# Patient Record
Sex: FEMALE | Race: WHITE | Employment: UNEMPLOYED | URBAN - METROPOLITAN AREA
[De-identification: names, ages, dates, MRNs, and addresses within clinical notes are randomized per-mention and may not be internally consistent; named-entity substitution may affect disease eponyms.]

---

## 2017-10-03 PROBLEM — R79.89 ELEVATED TROPONIN: Status: ACTIVE | Noted: 2017-10-03

## 2017-10-03 PROBLEM — I50.9 CHF (CONGESTIVE HEART FAILURE) (HCC): Status: ACTIVE | Noted: 2017-10-03

## 2017-10-03 PROBLEM — R77.8 ELEVATED TROPONIN: Status: ACTIVE | Noted: 2017-10-03

## 2017-10-04 LAB
LEFT VENTRICULAR EJECTION FRACTION HIGH VALUE: 60 %
LEFT VENTRICULAR EJECTION FRACTION MODE: NORMAL
LV EF: 55 %

## 2017-10-06 PROBLEM — I10 ESSENTIAL HYPERTENSION: Status: ACTIVE | Noted: 2017-10-06

## 2017-10-12 ENCOUNTER — TELEPHONE (OUTPATIENT)
Dept: CARDIOLOGY CLINIC | Age: 77
End: 2017-10-12

## 2017-10-12 NOTE — TELEPHONE ENCOUNTER
A HHN from [facility] called here. ... To say that this pt anti-biotic cleotin    Was given by hospitalist Dr Milton Encarnacion at [facility] is making her hallucinate      Jenny Torres @ [phone] says she is calling us because pt has NO PCP?     Please call Jenny Torres

## 2017-10-13 NOTE — TELEPHONE ENCOUNTER
Spoke with Dr. Arnav Ontiveros re: alternative antibiotic. He would prefer her to call the hospitalist as this is out of his specialty and not as familiar as hospitalist when it comes to antibiotics for patient. Relayed this information to Francy Russo. She thanked us and will call the hospitalist at [phone] to have Jocelyn Chadwick paged to discuss further.

## 2017-10-19 ENCOUNTER — TELEPHONE (OUTPATIENT)
Dept: CARDIOLOGY CLINIC | Age: 77
End: 2017-10-19

## 2017-10-19 ENCOUNTER — OFFICE VISIT (OUTPATIENT)
Dept: CARDIOLOGY CLINIC | Age: 77
End: 2017-10-19

## 2017-10-19 VITALS
DIASTOLIC BLOOD PRESSURE: 64 MMHG | WEIGHT: 200 LBS | BODY MASS INDEX: 37.79 KG/M2 | SYSTOLIC BLOOD PRESSURE: 120 MMHG | HEART RATE: 88 BPM

## 2017-10-19 DIAGNOSIS — I50.32 CHRONIC DIASTOLIC CONGESTIVE HEART FAILURE (HCC): ICD-10-CM

## 2017-10-19 DIAGNOSIS — I10 ESSENTIAL HYPERTENSION: ICD-10-CM

## 2017-10-19 DIAGNOSIS — R60.0 BILATERAL LOWER EXTREMITY EDEMA: ICD-10-CM

## 2017-10-19 DIAGNOSIS — I50.31 ACUTE DIASTOLIC HEART FAILURE (HCC): Primary | ICD-10-CM

## 2017-10-19 PROCEDURE — 99214 OFFICE O/P EST MOD 30 MIN: CPT | Performed by: NURSE PRACTITIONER

## 2017-10-19 PROCEDURE — G8400 PT W/DXA NO RESULTS DOC: HCPCS | Performed by: NURSE PRACTITIONER

## 2017-10-19 PROCEDURE — 1111F DSCHRG MED/CURRENT MED MERGE: CPT | Performed by: NURSE PRACTITIONER

## 2017-10-19 PROCEDURE — G8417 CALC BMI ABV UP PARAM F/U: HCPCS | Performed by: NURSE PRACTITIONER

## 2017-10-19 PROCEDURE — 4040F PNEUMOC VAC/ADMIN/RCVD: CPT | Performed by: NURSE PRACTITIONER

## 2017-10-19 PROCEDURE — G8484 FLU IMMUNIZE NO ADMIN: HCPCS | Performed by: NURSE PRACTITIONER

## 2017-10-19 PROCEDURE — 1123F ACP DISCUSS/DSCN MKR DOCD: CPT | Performed by: NURSE PRACTITIONER

## 2017-10-19 PROCEDURE — G8427 DOCREV CUR MEDS BY ELIG CLIN: HCPCS | Performed by: NURSE PRACTITIONER

## 2017-10-19 PROCEDURE — 1090F PRES/ABSN URINE INCON ASSESS: CPT | Performed by: NURSE PRACTITIONER

## 2017-10-19 PROCEDURE — 1036F TOBACCO NON-USER: CPT | Performed by: NURSE PRACTITIONER

## 2017-10-19 RX ORDER — CRANBERRY FRUIT EXTRACT 425 MG
4200 CAPSULE ORAL
COMMUNITY

## 2017-10-19 RX ORDER — LIDOCAINE 50 MG/G
1 PATCH TOPICAL DAILY
Qty: 30 PATCH | Refills: 0 | Status: SHIPPED | OUTPATIENT
Start: 2017-10-19

## 2017-10-19 RX ORDER — NITROFURANTOIN MACROCRYSTALS 100 MG/1
100 CAPSULE ORAL 4 TIMES DAILY
COMMUNITY
End: 2019-01-10 | Stop reason: ALTCHOICE

## 2017-10-19 RX ORDER — FUROSEMIDE 40 MG/1
40 TABLET ORAL 2 TIMES DAILY
Qty: 60 TABLET | Refills: 3 | Status: SHIPPED | OUTPATIENT
Start: 2017-10-19 | End: 2017-11-16 | Stop reason: SDUPTHER

## 2017-10-19 ASSESSMENT — ENCOUNTER SYMPTOMS
EYES NEGATIVE: 1
RESPIRATORY NEGATIVE: 1
GASTROINTESTINAL NEGATIVE: 1

## 2017-10-19 NOTE — PATIENT INSTRUCTIONS
1. Medications: Take afternoon lasix today then starting tomorrow, take 80mg lasix once a day instead of 40 twice a day, continue other medications  2. Labs: next week BMP per Home Health  3. Referrals: none   4. Lifestyle Recommendations: continue low sodium diet  5. Follow up: 3- 4 weeks with Jose Johnson  CHF Resource Line: [phone]  Patient Education        Limiting Sodium and Fluids With Heart Failure: Care Instructions  Your Care Instructions  Sodium causes your body to hold on to extra water. This may cause your heart failure symptoms to get worse. Limiting sodium may help you feel better and lower your risk of having to go to the hospital.  People get most of their sodium from processed foods. Fast food and restaurant meals also tend to be very high in sodium. Your doctor may suggest that you limit sodium to 2,000 milligrams (mg) a day or less. That is less than 1 teaspoon of salt a day, including all the salt you eat in cooked or packaged foods. Usually, you have to limit the amount of liquids you drink only if your heart failure is severe. Limiting sodium alone often is enough to help your body get rid of extra fluids. However, your doctor may tell you to limit your fluid intake to a set amount each day. Follow-up care is a key part of your treatment and safety. Be sure to make and go to all appointments, and call your doctor if you are having problems. It's also a good idea to know your test results and keep a list of the medicines you take. How can you care for yourself at home? Read food labels  · Read food labels on cans and food packages. The labels tell you how much sodium is in each serving. Make sure that you look at the serving size. If you eat more than the serving size, you have eaten more sodium than is listed for one serving. · Food labels also tell you the Percent Daily Value.  If the Percent Daily Value says 50%, it means that you will get at least 50% of all the sodium you need for the entire day in one serving. Choose products with low Percent Daily Values for sodium. · Be aware that sodium can come in forms other than salt, including monosodium glutamate (MSG), sodium citrate, and sodium bicarbonate (baking soda). MSG is often added to Asian food. You can sometimes ask for food without MSG or salt. Buy low-sodium foods  · Buy foods that are labeled \"unsalted\" (no salt added), \"sodium-free\" (less than 5 mg of sodium per serving), or \"low-sodium\" (less than 140 mg of sodium per serving). A food labeled \"light sodium\" has less than half of the full-sodium version of that food. Foods labeled \"reduced-sodium\" may still have too much sodium. · Buy fresh vegetables or plain, frozen vegetables. Buy low-sodium versions of canned vegetables, soups, and other canned goods. Prepare low-sodium meals  · Use less salt each day when cooking. Reducing salt in this way will help you adjust to the taste. Do not add salt after cooking. Take the salt shaker off the table. · Flavor your food with garlic, lemon juice, onion, vinegar, herbs, and spices instead of salt. Do not use soy sauce, steak sauce, onion salt, garlic salt, mustard, or ketchup on your food. · Make your own salad dressings, sauces, and ketchup without adding salt. · Use less salt (or none) when recipes call for it. You can often use half the salt a recipe calls for without losing flavor. Other dishes like rice, pasta, and grains do not need added salt. · Rinse canned vegetables. This removes somebut not allof the salt. · Avoid water that has a naturally high sodium content or that has been treated with water softeners, which add sodium. Call your local water company to find out the sodium content of your water supply. If you buy bottled water, read the label and choose a sodium-free brand. Avoid high-sodium foods, such as:  · Smoked, cured, salted, and canned meat, fish, and poultry.   · Ham, huang, hot dogs, and luncheon meats.  · Regular, hard, and processed cheese and regular peanut butter. · Crackers with salted tops. · Frozen prepared meals. · Canned and dried soups, broths, and bouillon, unless labeled sodium-free or low-sodium. · Canned vegetables, unless labeled sodium-free or low-sodium. · Salted snack foods such as chips and pretzels. · [place] fries, pizza, tacos, and other fast foods. · Pickles, olives, ketchup, and other condiments, especially soy sauce, unless labeled sodium-free or low-sodium. If you cannot cook for yourself  · Have family members or friends help you, or have someone cook low-sodium meals. · Check with your local senior nutrition program to find out where meals are served and whether they offer a low-sodium option. You can often find these programs through your local health department or hospital.  · Have meals delivered to your home. Most [facility] have a Meals on Openet. These programs provide one hot meal a day for older adults, delivered to their homes. Ask whether these meals are low-sodium. Let them know that you are on a low-sodium diet. Limiting fluid intake  · Find a method that works for you. You might simply write down how much you drink every time you do. Some people keep a container filled with the amount of fluid allowed for that day. If they drink from a source other than the container, then they pour out that amount. · Measure your regular drinking glasses to find out how much fluid each one holds. Once you know this, you will not have to measure every time. · Besides water, milk, juices, and other drinks, some foods have a lot of fluid. Count any foods that will melt (such as ice cream or gelatin dessert) or liquid foods (such as soup) as part of your fluid intake for the day. Where can you learn more? Go to https://ruby.healthOpen Lending. org and sign in to your Ambassador account.  Enter A166 in the Molcure box to learn more about \"Limiting Sodium and Fluids With Heart Failure: Care Instructions. \"     If you do not have an account, please click on the \"Sign Up Now\" link. Current as of: November 15, 2016  Content Version: 11.3  © 3526-2881 [employer]. Care instructions adapted under license by [facility] ([facility]). If you have questions about a medical condition or this instruction, always ask your healthcare professional. [address] any warranty or liability for your use of this information.

## 2017-10-19 NOTE — PROGRESS NOTES
[address]   Congestive Heart Failure    Primary Care Doctor:  Ancelmo Gonzalez    Chief Complaint: \"peeing all the time\"     History of Present Illness:  Jay Early is a 64 y.o. female with PMH HTN and newly diagnosed HFpEF who presents today for hospital f/u. Jay Early was admitted  10/2/17with increased edema and dyspnea and discharged 10/11/17. Since hospitalization he/she reports dyspnea, edema and denies chest pain, palpitations, orthopnea, PND, exertional chest pressure/discomfort, fatigue, early saiety, syncope. Approximate hosp diuresis was 21.5L, hospital weight on d/c was 216lb and discharge home weight was 210lb. Daily wts since that time have continued to decrease. Today's home wt: 200, last known dry wt prior to hosp was about 190lb  BP's at home 140-150's/60-80's  Med changes include starting metoprolol and increased lasix to 40mg twice a day  Sodium and fluid restriction compliance: good  Skipped this morning's lasix due to OV and c/o increased urination at night    Sodium Restrictions; 2g  Fluid Restrictions; 48-64 oz/day    Activity: increased since before admission    EF: 55-60%         Past Medical History:   has a past medical history of Hypertension. Surgical History:   has a past surgical history that includes Hysterectomy and Cholecystectomy. Social History:   reports that she has never smoked. She has never used smokeless tobacco. She reports that she does not drink alcohol or use drugs. Family History:   History reviewed. No pertinent family history. Home Medications:  Prior to Admission medications    Medication Sig Start Date End Date Taking?  Authorizing Provider   metoprolol tartrate (LOPRESSOR) 25 MG tablet Take 1 tablet by mouth 2 times daily 10/11/17   Linda Tomlinson MD   furosemide (LASIX) 40 MG tablet Take 1 tablet by mouth 2 times daily 10/11/17   Linda Tomlinson MD   chlordiazePOXIDE-clidinium (LIBRAX) 5-2.5 MG per capsule Take 1 capsule by mouth 4 times daily as needed    Historical Provider, MD   Fexofenadine HCl (ALLEGRA PO) Take 180 mg by mouth    Historical Provider, MD   clobetasol (TEMOVATE) 0.05 % cream Apply topically 2 times daily Apply topically 2 times daily. Historical Provider, MD   Omega-3 Fatty Acids (FISH OIL PO) Take 1,200 mg by mouth    Historical Provider, MD   hydrALAZINE (APRESOLINE) 50 MG tablet Take 50 mg by mouth 3 times daily    Historical Provider, MD   hydrOXYzine (VISTARIL) 25 MG capsule Take 25 mg by mouth    Historical Provider, MD   lidocaine (LIDODERM) 5 % Place 1 patch onto the skin daily 12 hours on, 12 hours off. Historical Provider, MD   magnesium (MAGNESIUM-OXIDE) 250 MG TABS tablet Take 250 mg by mouth daily    Historical Provider, MD   Multiple Vitamins-Minerals (MULTIVITAMIN ADULT PO) Take by mouth    Historical Provider, MD   esomeprazole Magnesium (NEXIUM) 40 MG PACK Take 40 mg by mouth daily    Historical Provider, MD   NYSTATIN EX Apply topically    Historical Provider, MD   potassium chloride (MICRO-K) 10 MEQ extended release capsule Take 10 mEq by mouth    Historical Provider, MD        Allergies:  Accupril [quinapril hcl]; Anaprox [naproxen sodium]; Biaxin [clarithromycin]; Buspar [buspirone]; Butrans [buprenorphine]; Capoten [captopril]; Cardene [nicardipine]; Cardizem [diltiazem hcl]; Ciprofloxacin; Codeine; Cymbalta [duloxetine hcl]; Demerol hcl [meperidine]; Doxycycline; Elavil [amitriptyline hcl]; Entex lq [phenylephrine-guaifenesin]; Feldene [piroxicam]; Gabapentin; Hctz [hydrochlorothiazide]; Isoptin [verapamil]; Keflex [cephalexin]; Lorazepam; Lyrica [pregabalin]; Meclomen [meclofenamate]; Morphine; Nabumetone; Nucynta [tapentadol]; Ofloxacin; Peanut butter flavor; Percocet [oxycodone-acetaminophen]; Premarin [conjugated estrogens]; Prinivil [lisinopril]; Seldane [terfenadine]; Strawberry flavor; Sulfa antibiotics; Tagamet [cimetidine];  Temazepam; Terbinafine and related; 10/11/2017    BUN 42 10/10/2017    BUN 43 10/09/2017    CREATININE 1.7 10/11/2017    CREATININE 1.6 10/10/2017    CREATININE 1.5 10/09/2017     BNP:   Lab Results   Component Value Date    PROBNP 2,567 10/02/2017        Cardiac Imaging:Echo 10/4/17  Left ventricle size is normal.   There is moderate concentric left ventricular hypertrophy. Ejection fraction is visually estimated to be 55-60 %. Diastolic filling parameters suggests grade I diastolic dysfunction . Mild mitral regurgitation is present. The left atrium is dilated. .There is mild tricuspid regurgitation with RVSP estimated at 34 mmHg      Device; No    JOSE Evaulation:  No      Assessment:    1. Acute diastolic heart failure ([address] - compensated   2. Bilateral lower extremity edema - improved, still have about 10lb left to lose   3. Essential hypertension - controlled   4. Chronic diastolic congestive heart failure ([address]          Plan:   1. Medications: Take afternoon lasix today then starting tomorrow, take 80mg lasix once a day instead of 40 twice a day, continue other medications  2. Labs: next week BMP  3. Referrals: none, consider JOSE evaluation   4. Lifestyle Recommendations: continue low sodium diet  5. Follow up: 4 weeks with Ashley Monroy  CHF Resource Line: [phone]      I appreciate the opportunity of cooperating in the care of this individual.    Renan Estevez CNP, 10/19/2017, 12:37 PM    QUALITY MEASURES  1. Tobacco Cessation Counseling: NA  2. Retake of BP if >140/90:   NA  3. Documentation to PCP/referring for new patient:  Sent to PCP at close of office visit  4. CAD patient on anti-platelet: NA  5. CAD patient on STATIN therapy:  NA  6. Patient with CHF and aFib on anticoagulation:  NA   7. Patient Education:  Yes   8. BB for LVSD :  NA   9. ACE/ARB for LVSD:  NA   10.  Left Ventricular Ejection Fraction (LVEF) Assessment:  Yes

## 2017-10-24 LAB
BUN BLDV-MCNC: 88 MG/DL (ref 8–26)
CALCIUM SERPL-MCNC: 10.5 MG/DL (ref 8.5–10.5)
CHLORIDE BLD-SCNC: 92 MEQ/L (ref 101–111)
CO2: 32 MEQ/L (ref 24–36)
CREAT SERPL-MCNC: 2.99 MG/DL (ref 0.44–1.03)
GFR AFRICAN AMERICAN: 18 ML/MIN/1.73 SQ METER
GFR NON-AFRICAN AMERICAN: 15 ML/MIN/1.73 SQ METER
GLUCOSE BLD-MCNC: 136 MG/DL (ref 70–99)
OSMOLALITY CALCULATION: 301 MOSM/KG (ref 280–300)
POTASSIUM SERPL-SCNC: 4.7 MEQ/L (ref 3.6–5.4)
SODIUM BLD-SCNC: 136 MEQ/L (ref 135–145)

## 2017-10-25 ENCOUNTER — TELEPHONE (OUTPATIENT)
Dept: CARDIOLOGY CLINIC | Age: 77
End: 2017-10-25

## 2017-10-26 ENCOUNTER — TELEPHONE (OUTPATIENT)
Dept: CARDIOLOGY CLINIC | Age: 77
End: 2017-10-26

## 2017-10-26 DIAGNOSIS — I50.31 ACUTE DIASTOLIC HEART FAILURE (HCC): Primary | ICD-10-CM

## 2017-10-26 DIAGNOSIS — I50.32 CHRONIC DIASTOLIC CONGESTIVE HEART FAILURE (HCC): ICD-10-CM

## 2017-10-26 NOTE — TELEPHONE ENCOUNTER
Wt stable, continue holding lasix, hold potassium as well. Call office if wt increases 3lb or more, repeat labs on Monday:  BMP, BNP, MG.  Thanks, yuli

## 2017-10-27 ENCOUNTER — TELEPHONE (OUTPATIENT)
Dept: CARDIOLOGY CLINIC | Age: 77
End: 2017-10-27

## 2017-10-27 NOTE — TELEPHONE ENCOUNTER
Pt called back with weight pt is  199 lbs today wanted to know if she should stop lasix and potassium

## 2017-10-30 ENCOUNTER — TELEPHONE (OUTPATIENT)
Dept: CARDIOLOGY CLINIC | Age: 77
End: 2017-10-30

## 2017-11-07 ENCOUNTER — HOSPITAL ENCOUNTER (OUTPATIENT)
Dept: OTHER | Age: 77
Discharge: HOME | End: 2017-11-07
Attending: INTERNAL MEDICINE | Admitting: INTERNAL MEDICINE

## 2017-11-07 LAB
ANION GAP SERPL CALCULATED.3IONS-SCNC: 14 MMOL/L (ref 3–16)
BUN BLDV-MCNC: 45 MG/DL (ref 7–20)
CALCIUM SERPL-MCNC: 10.5 MG/DL (ref 8.3–10.6)
CHLORIDE BLD-SCNC: 97 MMOL/L (ref 99–110)
CO2: 31 MMOL/L (ref 21–32)
CREAT SERPL-MCNC: 1.4 MG/DL (ref 0.6–1.2)
GFR AFRICAN AMERICAN: 44
GFR NON-AFRICAN AMERICAN: 36
GLUCOSE BLD-MCNC: 85 MG/DL (ref 70–99)
MAGNESIUM: 2.7 MG/DL (ref 1.8–2.4)
POTASSIUM SERPL-SCNC: 4.7 MMOL/L (ref 3.5–5.1)
PRO-BNP: 984 PG/ML (ref 0–449)
SODIUM BLD-SCNC: 142 MMOL/L (ref 136–145)

## 2017-11-09 ENCOUNTER — OFFICE VISIT (OUTPATIENT)
Dept: CARDIOLOGY CLINIC | Age: 77
End: 2017-11-09

## 2017-11-09 VITALS
HEART RATE: 78 BPM | SYSTOLIC BLOOD PRESSURE: 128 MMHG | BODY MASS INDEX: 36.84 KG/M2 | WEIGHT: 195 LBS | DIASTOLIC BLOOD PRESSURE: 64 MMHG

## 2017-11-09 DIAGNOSIS — R60.0 BILATERAL LOWER EXTREMITY EDEMA: ICD-10-CM

## 2017-11-09 DIAGNOSIS — I50.31 ACUTE DIASTOLIC HEART FAILURE (HCC): Primary | ICD-10-CM

## 2017-11-09 DIAGNOSIS — I10 ESSENTIAL HYPERTENSION: ICD-10-CM

## 2017-11-09 PROCEDURE — 99214 OFFICE O/P EST MOD 30 MIN: CPT | Performed by: NURSE PRACTITIONER

## 2017-11-09 PROCEDURE — 1111F DSCHRG MED/CURRENT MED MERGE: CPT | Performed by: NURSE PRACTITIONER

## 2017-11-09 PROCEDURE — G8484 FLU IMMUNIZE NO ADMIN: HCPCS | Performed by: NURSE PRACTITIONER

## 2017-11-09 PROCEDURE — 1036F TOBACCO NON-USER: CPT | Performed by: NURSE PRACTITIONER

## 2017-11-09 PROCEDURE — G8417 CALC BMI ABV UP PARAM F/U: HCPCS | Performed by: NURSE PRACTITIONER

## 2017-11-09 PROCEDURE — G8427 DOCREV CUR MEDS BY ELIG CLIN: HCPCS | Performed by: NURSE PRACTITIONER

## 2017-11-09 PROCEDURE — 4040F PNEUMOC VAC/ADMIN/RCVD: CPT | Performed by: NURSE PRACTITIONER

## 2017-11-09 PROCEDURE — 1090F PRES/ABSN URINE INCON ASSESS: CPT | Performed by: NURSE PRACTITIONER

## 2017-11-09 PROCEDURE — G8400 PT W/DXA NO RESULTS DOC: HCPCS | Performed by: NURSE PRACTITIONER

## 2017-11-09 PROCEDURE — 1123F ACP DISCUSS/DSCN MKR DOCD: CPT | Performed by: NURSE PRACTITIONER

## 2017-11-09 RX ORDER — HYDRALAZINE HYDROCHLORIDE 50 MG/1
50 TABLET, FILM COATED ORAL 4 TIMES DAILY
Qty: 360 TABLET | Refills: 3 | Status: SHIPPED | OUTPATIENT
Start: 2017-11-09 | End: 2018-04-03 | Stop reason: ALTCHOICE

## 2017-11-09 ASSESSMENT — ENCOUNTER SYMPTOMS
EYES NEGATIVE: 1
RESPIRATORY NEGATIVE: 1
GASTROINTESTINAL NEGATIVE: 1

## 2017-11-09 NOTE — PATIENT INSTRUCTIONS
1. Medications: Continue Lasix 40mg/day, if wt increases to 198lb then take 80mg Lasix  2. Labs: 4 weeks  3. Referrals: JOSE evaluation strongly reccommended   4. Lifestyle Recommendations: continue low sodium diet  5. Follow up: 6 weeks with Adolph Goodell, 3 months with Dr Jose Obrien: [phone]  Patient Education        Learning About Heart Failure Zones  What are heart failure zones? Heart failure zones give you an easy way to see changes in your heart failure symptoms. They also tell you when you need to get help. Check every day to see which zone you are in. Green zone. You are doing well. This is where you want to be. · Your weight is stable. This means it is not going up or down. · You breathe easily. · You are sleeping well. You are able to lie flat without shortness of breath. · You can do your usual activities. Yellow zone. Be careful. Your symptoms are changing. Call your doctor. · You have new or increased shortness of breath. · You are dizzy or lightheaded, or you feel like you may faint. · You have sudden weight gain, such as more than 2 to 3 pounds in a day or 5 pounds in a week. (Your doctor may suggest a different range of weight gain.)  · You have increased swelling in your legs, ankles, or feet. · You are so tired or weak that you cannot do your usual activities. · You are not sleeping well. Shortness of breath wakes you up at night. You need extra pillows. Your doctor's name: ____________________________________________________________  Your doctor's contact information: _________________________________________________  Red zone. This is an emergency. Call 911. You have symptoms of sudden heart failure, such as:  · You have severe trouble breathing. · You cough up pink, foamy mucus. · You have a new irregular or fast heartbeat. You have symptoms of a heart attack. These may include:  · Chest pain or pressure, or a strange feeling in the chest.  · Sweating.   · Shortness of breath. · Nausea or vomiting. · Pain, pressure, or a strange feeling in the back, neck, jaw, or upper belly or in one or both shoulders or arms. · Lightheadedness or sudden weakness. · A fast or irregular heartbeat. If you have symptoms of a heart attack: After you call 911, the  may tell you to chew 1 adult-strength or 2 to 4 low-dose aspirin. Wait for an ambulance. Do not try to drive yourself. Follow-up care is a key part of your treatment and safety. Be sure to make and go to all appointments, and call your doctor if you are having problems. It's also a good idea to know your test results and keep a list of the medicines you take. Where can you learn more? Go to https://Sonico.Sift. org and sign in to your rubberit account. Enter T174 in the CloudBees box to learn more about \"Learning About Heart Failure Zones. \"     If you do not have an account, please click on the \"Sign Up Now\" link. Current as of: February 23, 2017  Content Version: 11.3  © 9684-6582 [employer]orporated. Care instructions adapted under license by [facility] ([facility]). If you have questions about a medical condition or this instruction, always ask your healthcare professional. [place] [address] any warranty or liability for your use of this information.

## 2017-11-09 NOTE — PROGRESS NOTES
[facility]   Congestive Heart Failure    Primary Care Doctor:  Pavel Willis    Chief Complaint: leg cramps, swelling    History of Present Illness:  Leopoldo Medina is a 76 y.o. female with PMH HTN and newly diagnosed HFpEF with Oct hospitalization who presents today for f/u. She continues to lose weight post-hospital, her Cr increased to 2.9 10/24 and lasix held then decreased. Most recent Cr 11/7 was 1.4  Home wt today 195 and her baseline seems to be about 190lb  BP's at home 120's/70's  She reports inpt stay at [facility] for UTI recently    She denies chest pain, increased dyspnea, palpitations, orthopnea, PND, or worsening edema      Sodium Restrictions; 2g  Fluid Restrictions; 48-64 oz/day  Sodium and fluid restriction compliance: good    Activity: limited by joint pain    EF: 55-60%      Past Medical History:   has a past medical history of Hypertension. Surgical History:   has a past surgical history that includes Hysterectomy and Cholecystectomy. Social History:   reports that she has never smoked. She has never used smokeless tobacco. She reports that she does not drink alcohol or use drugs. Family History:   History reviewed. No pertinent family history. Home Medications:  Prior to Admission medications    Medication Sig Start Date End Date Taking?  Authorizing Provider   nitrofurantoin (MACRODANTIN) 100 MG capsule Take 100 mg by mouth 4 times daily    Historical Provider, MD   Cranberry 425 MG CAPS Take by mouth    Historical Provider, MD   diphenhydrAMINE-APAP 12.5-325 MG/15ML LIQD Take by mouth    Historical Provider, MD   ibuprofen (ADVIL;MOTRIN) 100 MG/5ML suspension Take by mouth every 4 hours as needed for Fever    Historical Provider, MD   metoprolol tartrate (LOPRESSOR) 25 MG tablet Take 1 tablet by mouth 2 times daily 10/19/17   Anne Finley NP   furosemide (LASIX) 40 MG tablet Take 1 tablet by mouth 2 times daily 10/19/17   Anne Finley NP   lidocaine (LIDODERM) 5 % Place 1 patch onto the skin daily 12 hours on, 12 hours off. 10/19/17   Rebecca Liu, NP   chlordiazePOXIDE-clidinium (LIBRAX) 5-2.5 MG per capsule Take 1 capsule by mouth 4 times daily as needed    Historical Provider, MD   Fexofenadine HCl (ALLEGRA PO) Take 180 mg by mouth    Historical Provider, MD   clobetasol (TEMOVATE) 0.05 % cream Apply topically 2 times daily Apply topically 2 times daily. Historical Provider, MD   Omega-3 Fatty Acids (FISH OIL PO) Take 1,200 mg by mouth    Historical Provider, MD   hydrALAZINE (APRESOLINE) 50 MG tablet Take 50 mg by mouth 3 times daily    Historical Provider, MD   hydrOXYzine (VISTARIL) 25 MG capsule Take 25 mg by mouth    Historical Provider, MD   magnesium (MAGNESIUM-OXIDE) 250 MG TABS tablet Take 250 mg by mouth daily    Historical Provider, MD   Multiple Vitamins-Minerals (MULTIVITAMIN ADULT PO) Take by mouth    Historical Provider, MD   esomeprazole Magnesium (NEXIUM) 40 MG PACK Take 40 mg by mouth daily    Historical Provider, MD   NYSTATIN EX Apply topically    Historical Provider, MD   potassium chloride (MICRO-K) 10 MEQ extended release capsule Take 10 mEq by mouth    Historical Provider, MD        Allergies:  Accupril [quinapril hcl]; Anaprox [naproxen sodium]; Biaxin [clarithromycin]; Buspar [buspirone]; Butrans [buprenorphine]; Capoten [captopril]; Cardene [nicardipine]; Cardizem [diltiazem hcl]; Ciprofloxacin; Codeine; Cymbalta [duloxetine hcl]; Demerol hcl [meperidine]; Doxycycline; Elavil [amitriptyline hcl]; Entex lq [phenylephrine-guaifenesin]; Feldene [piroxicam]; Gabapentin; Hctz [hydrochlorothiazide]; Isoptin [verapamil]; Keflex [cephalexin]; Lorazepam; Lyrica [pregabalin]; Meclomen [meclofenamate]; Morphine; Nabumetone; Nucynta [tapentadol]; Ofloxacin; Peanut butter flavor; Percocet [oxycodone-acetaminophen]; Premarin [conjugated estrogens]; Prinivil [lisinopril]; Seldane [terfenadine];  Strawberry flavor; Sulfa antibiotics; Tagamet [cimetidine]; Temazepam; Terbinafine and related; Tetracyclines & related; Tramadol; Trazodone and nefazodone; Voltaren [diclofenac sodium]; Zanaflex [tizanidine hcl]; Amoxicillin; and Penicillins     ROS:   Review of Systems   Constitutional: Negative. HENT: Negative. Eyes: Negative. Respiratory: Negative. Cardiovascular: Positive for leg swelling. Improved   Gastrointestinal: Negative. Genitourinary: Negative. Recent UTI   Musculoskeletal: Negative. Skin: Negative. Neurological: Negative. Hematological: Negative. Psychiatric/Behavioral: Negative. Physical Examination:    Vitals:    11/09/17 1305   BP: 128/64   Pulse: 78   Weight: 195 lb (88.5 kg)           Physical Exam   Constitutional: She is oriented to person, place, and time. She appears well-developed and well-nourished. HENT:   Head: Normocephalic and atraumatic. Eyes: Conjunctivae are normal. Pupils are equal, round, and reactive to light. Neck: Normal range of motion. Neck supple. JVD present. Cardiovascular: Normal rate, regular rhythm, normal heart sounds and intact distal pulses. Pulmonary/Chest: Effort normal and breath sounds normal.   Abdominal: Soft. Musculoskeletal: Normal range of motion. She exhibits edema. 1+ bilaterally   Neurological: She is alert and oriented to person, place, and time. Skin: Skin is warm and dry. Psychiatric: She has a normal mood and affect. Vitals reviewed.       Lab Data:    CBC:   Lab Results   Component Value Date    WBC 7.7 10/02/2017    RBC 3.91 10/02/2017    HGB 11.6 10/02/2017    HCT 35.7 10/02/2017    MCV 91.3 10/02/2017    RDW 14.7 10/02/2017     10/02/2017     BMP:  Lab Results   Component Value Date     11/07/2017     10/24/2017     10/11/2017    K 4.7 11/07/2017    K 4.7 10/24/2017    K 3.8 10/11/2017    CL 97 11/07/2017    CL 92 10/24/2017    CL 90 10/11/2017    CO2 31 11/07/2017    CO2 32 10/24/2017    CO2 41 10/11/2017    PHOS 2.9 10/11/2017    PHOS 2.8 10/10/2017    PHOS 3.3 10/09/2017    BUN 45 2017    BUN 88 10/24/2017    BUN 43 10/11/2017    CREATININE 1.4 2017    CREATININE 2.99 10/24/2017    CREATININE 1.7 10/11/2017     BNP:   Lab Results   Component Value Date    PROBNP 984 2017    PROBNP 2,567 10/02/2017        Cardiac Imaging:Echo 10/4/17  Left ventricle size is normal.   There is moderate concentric left ventricular hypertrophy. Ejection fraction is visually estimated to be 55-60 %. Diastolic filling parameters suggests grade I diastolic dysfunction . Mild mitral regurgitation is present. The left atrium is dilated. .There is mild tricuspid regurgitation with RVSP estimated at 34 mmHg      Device; No    JOSE Evaulation:  No, refuses       Assessment:    1. Acute diastolic heart failure ([address] - compensated   2. Bilateral lower extremity edema - improved   3. Essential hypertension - controlled   4. Chronic diastolic congestive heart failure ([address] - encouraged JOSE evaluation         Plan:   1. Medications: Continue Lasix 40mg/day, if wt increases to 198lb then take 80mg Lasix  2. Labs: 4 weeks  3. Referrals: JOSE evaluation strongly reccommended   4. Lifestyle Recommendations: continue low sodium diet  5. Follow up: 6 weeks with Fartun Montalvo, 3 months with Dr Gerhardt Shilling: [phone]      I appreciate the opportunity of cooperating in the care of this individual.    Katarina Sesay CNP, 2017, 1:03 PM    QUALITY MEASURES  1. Tobacco Cessation Counseling: NA  2. Retake of BP if >140/90:   NA  3. Documentation to PCP/referring for new patient:  Sent to PCP at close of office visit  4. CAD patient on anti-platelet: NA  5. CAD patient on STATIN therapy:  NA  6. Patient with CHF and aFib on anticoagulation:  NA   7. Patient Education:  Yes   8. BB for LVSD :  NA   9. ACE/ARB for LVSD:  NA   10.  Left Ventricular Ejection Fraction (LVEF) Assessment:  Yes

## 2017-12-06 LAB
B-TYPE NATRIURETIC PEPTIDE: 152 PG/ML (ref 0–95)
BUN BLDV-MCNC: 60 MG/DL (ref 8–26)
CALCIUM SERPL-MCNC: 10.2 MG/DL (ref 8.5–10.5)
CHLORIDE BLD-SCNC: 100 MEQ/L (ref 101–111)
CO2: 27 MEQ/L (ref 24–36)
CREAT SERPL-MCNC: 1.79 MG/DL (ref 0.44–1.03)
GFR AFRICAN AMERICAN: 33 ML/MIN/1.73 SQ METER
GFR NON-AFRICAN AMERICAN: 27 ML/MIN/1.73 SQ METER
GLUCOSE BLD-MCNC: 117 MG/DL (ref 70–99)
MAGNESIUM: 1.7 MEQ/L (ref 1.4–2)
OSMOLALITY CALCULATION: 288 MOSM/KG (ref 280–300)
POTASSIUM SERPL-SCNC: 4.6 MEQ/L (ref 3.6–5.4)
SODIUM BLD-SCNC: 135 MEQ/L (ref 135–145)

## 2017-12-07 ENCOUNTER — TELEPHONE (OUTPATIENT)
Dept: CARDIOLOGY CLINIC | Age: 77
End: 2017-12-07

## 2017-12-07 NOTE — TELEPHONE ENCOUNTER
Hunter Keith from [facility] called to see if Dr. Franko Jackson will continue to follow and sign orders.  Hunter Keith call back is [phone]

## 2017-12-21 ENCOUNTER — OFFICE VISIT (OUTPATIENT)
Dept: CARDIOLOGY CLINIC | Age: 77
End: 2017-12-21

## 2017-12-21 VITALS
WEIGHT: 186 LBS | HEART RATE: 53 BPM | BODY MASS INDEX: 35.14 KG/M2 | DIASTOLIC BLOOD PRESSURE: 70 MMHG | SYSTOLIC BLOOD PRESSURE: 128 MMHG

## 2017-12-21 DIAGNOSIS — I10 ESSENTIAL HYPERTENSION: ICD-10-CM

## 2017-12-21 DIAGNOSIS — I50.32 CHRONIC DIASTOLIC CONGESTIVE HEART FAILURE (HCC): Primary | ICD-10-CM

## 2017-12-21 DIAGNOSIS — R60.0 BILATERAL LOWER EXTREMITY EDEMA: ICD-10-CM

## 2017-12-21 PROCEDURE — 1090F PRES/ABSN URINE INCON ASSESS: CPT | Performed by: NURSE PRACTITIONER

## 2017-12-21 PROCEDURE — 99213 OFFICE O/P EST LOW 20 MIN: CPT | Performed by: NURSE PRACTITIONER

## 2017-12-21 PROCEDURE — G8417 CALC BMI ABV UP PARAM F/U: HCPCS | Performed by: NURSE PRACTITIONER

## 2017-12-21 PROCEDURE — G8484 FLU IMMUNIZE NO ADMIN: HCPCS | Performed by: NURSE PRACTITIONER

## 2017-12-21 PROCEDURE — 1036F TOBACCO NON-USER: CPT | Performed by: NURSE PRACTITIONER

## 2017-12-21 PROCEDURE — G8400 PT W/DXA NO RESULTS DOC: HCPCS | Performed by: NURSE PRACTITIONER

## 2017-12-21 PROCEDURE — 1123F ACP DISCUSS/DSCN MKR DOCD: CPT | Performed by: NURSE PRACTITIONER

## 2017-12-21 PROCEDURE — G8427 DOCREV CUR MEDS BY ELIG CLIN: HCPCS | Performed by: NURSE PRACTITIONER

## 2017-12-21 PROCEDURE — 4040F PNEUMOC VAC/ADMIN/RCVD: CPT | Performed by: NURSE PRACTITIONER

## 2017-12-21 ASSESSMENT — ENCOUNTER SYMPTOMS
GASTROINTESTINAL NEGATIVE: 1
RESPIRATORY NEGATIVE: 1
EYES NEGATIVE: 1

## 2017-12-21 NOTE — PROGRESS NOTES
[facility]   Congestive Heart Failure    Primary Care Doctor:  Xu Toney DO    Chief Complaint: leg cramps, swelling    History of Present Illness:  Marquis Mojica is a 76 y.o. female with PMH HTN and newly diagnosed HFpEF with Oct hospitalization who presents today for f/u. She continues to lose weight post-hospital,down about 9 more pounds since her last. . Most recent Cr 12/6 was 1.7. Nephrology has given her protocol for diuretics, usually taking 1/2 to none according to wt gain or loss. She denies chest pain, increased dyspnea, palpitations, orthopnea, PND, or worsening edema  Home wt today 186-188  BP's at home 694'W/69'J, no systolic <167    Baseline Wt: 185-190lb  Sodium Restrictions; 2g  Fluid Restrictions; 48-64 oz/day  Sodium and fluid restriction compliance: good    Activity: limited by joint pain    EF: 55-60%      Past Medical History:   has a past medical history of Hypertension. Surgical History:   has a past surgical history that includes Hysterectomy and Cholecystectomy. Social History:   reports that she has never smoked. She has never used smokeless tobacco. She reports that she does not drink alcohol or use drugs. Family History:   History reviewed. No pertinent family history. Home Medications:  Prior to Admission medications    Medication Sig Start Date End Date Taking?  Authorizing Provider   traMADol (ULTRAM) 50 MG tablet TAKE 1 TABLET BY MOUTH TWICE DAILY AS NEEDED FOR PAIN 12/8/17   Cornell Arguello MD   furosemide (LASIX) 40 MG tablet Take 1 tablet by mouth daily 11/16/17   Cornell Arguello MD   hydrALAZINE (APRESOLINE) 50 MG tablet Take 1 tablet by mouth 4 times daily 11/9/17   Madisyn Escobar NP   nitrofurantoin (MACRODANTIN) 100 MG capsule Take 100 mg by mouth 4 times daily    Historical Provider, MD   Cranberry 425 MG CAPS Take by mouth    Historical Provider, MD   diphenhydrAMINE-APAP 12.5-325 MG/15ML LIQD Take by mouth    Historical Provider, Strawberry flavor; Sulfa antibiotics; Tagamet [cimetidine]; Temazepam; Terbinafine and related; Tetracyclines & related; Tramadol; Trazodone and nefazodone; Voltaren [diclofenac sodium]; Zanaflex [tizanidine hcl]; Amoxicillin; and Penicillins     ROS:   Review of Systems   Constitutional: Negative. HENT: Negative. Eyes: Negative. Respiratory: Negative. Cardiovascular: Positive for leg swelling. Improved   Gastrointestinal: Negative. Genitourinary: Negative. Musculoskeletal: Negative. Skin: Negative. Neurological: Negative. Hematological: Negative. Psychiatric/Behavioral: Negative. Physical Examination:    Vitals:    12/21/17 1303   BP: 128/70   Pulse: 53   Weight: 186 lb (84.4 kg)           Physical Exam   Constitutional: She is oriented to person, place, and time. She appears well-developed and well-nourished. HENT:   Head: Normocephalic and atraumatic. Eyes: Conjunctivae are normal. Pupils are equal, round, and reactive to light. Neck: Normal range of motion. Neck supple. Cardiovascular: Normal rate, regular rhythm, normal heart sounds and intact distal pulses. Pulmonary/Chest: Effort normal and breath sounds normal.   Abdominal: Soft. Musculoskeletal: Normal range of motion. She exhibits edema. Trace in feet, more in upper legs   Neurological: She is alert and oriented to person, place, and time. Skin: Skin is warm and dry. Psychiatric: She has a normal mood and affect. Vitals reviewed.       Lab Data:    CBC:   Lab Results   Component Value Date    WBC 7.7 10/02/2017    RBC 3.91 10/02/2017    HGB 11.6 10/02/2017    HCT 35.7 10/02/2017    MCV 91.3 10/02/2017    RDW 14.7 10/02/2017     10/02/2017     BMP:  Lab Results   Component Value Date     12/06/2017     11/07/2017     10/24/2017    K 4.6 12/06/2017    K 4.7 11/07/2017    K 4.7 10/24/2017     12/06/2017    CL 97 11/07/2017    CL 92 10/24/2017    CO2 27 12/06/2017 CO2 31 11/07/2017    CO2 32 10/24/2017    PHOS 2.9 10/11/2017    PHOS 2.8 10/10/2017    PHOS 3.3 10/09/2017    BUN 60 12/06/2017    BUN 45 11/07/2017    BUN 88 10/24/2017    CREATININE 1.79 12/06/2017    CREATININE 1.4 11/07/2017    CREATININE 2.99 10/24/2017     BNP:   Lab Results   Component Value Date    PROBNP 984 11/07/2017    PROBNP 2,567 10/02/2017        Cardiac Imaging:Echo 10/4/17  Left ventricle size is normal.   There is moderate concentric left ventricular hypertrophy. Ejection fraction is visually estimated to be 55-60 %. Diastolic filling parameters suggests grade I diastolic dysfunction . Mild mitral regurgitation is present. The left atrium is dilated. .There is mild tricuspid regurgitation with RVSP estimated at 34 mmHg      Device: No    JOSE Evaulation:  No, refuses       Assessment:    1. Acute diastolic heart failure ([address] - compensated   2. Bilateral lower extremity edema - improved   3. Essential hypertension - controlled   4. Chronic diastolic congestive heart failure ([address] - encouraged JOSE evaluation         Plan:   1. Medications: Continue diuretic regimen per nephrology  2. Labs: per nephrology  3. Referrals: JOSE evaluation strongly reccommended   4. Lifestyle Recommendations: continue low sodium diet  5. Follow up: Feb with Dr Jose Carlos Ramsey: [phone]      I appreciate the opportunity of cooperating in the care of this individual.    Flex Smith CNP, 12/21/2017, 1:01 PM    QUALITY MEASURES  1. Tobacco Cessation Counseling: NA  2. Retake of BP if >140/90:   NA  3. Documentation to PCP/referring for new patient:  Sent to PCP at close of office visit  4. CAD patient on anti-platelet: NA  5. CAD patient on STATIN therapy:  NA  6. Patient with CHF and aFib on anticoagulation:  NA   7. Patient Education:  Yes   8. BB for LVSD :  NA   9. ACE/ARB for LVSD:  NA   10.  Left Ventricular Ejection Fraction (LVEF) Assessment:  Yes

## 2017-12-21 NOTE — PATIENT INSTRUCTIONS
1. Medications: Continue diuretic regimen per nephrology  2. Labs: per nephrology  3. Referrals: JOSE evaluation strongly reccommended   4. Lifestyle Recommendations: continue low sodium diet  5. Follow up: Feb with Dr Viky Greco: [phone]  Patient Education        Learning About Heart Failure Zones  What are heart failure zones? Heart failure zones give you an easy way to see changes in your heart failure symptoms. They also tell you when you need to get help. Check every day to see which zone you are in. Green zone. You are doing well. This is where you want to be. · Your weight is stable. This means it is not going up or down. · You breathe easily. · You are sleeping well. You are able to lie flat without shortness of breath. · You can do your usual activities. Yellow zone. Be careful. Your symptoms are changing. Call your doctor. · You have new or increased shortness of breath. · You are dizzy or lightheaded, or you feel like you may faint. · You have sudden weight gain, such as more than 2 to 3 pounds in a day or 5 pounds in a week. (Your doctor may suggest a different range of weight gain.)  · You have increased swelling in your legs, ankles, or feet. · You are so tired or weak that you cannot do your usual activities. · You are not sleeping well. Shortness of breath wakes you up at night. You need extra pillows. Your doctor's name: ____________________________________________________________  Your doctor's contact information: _________________________________________________  Red zone. This is an emergency. Call 911. You have symptoms of sudden heart failure, such as:  · You have severe trouble breathing. · You cough up pink, foamy mucus. · You have a new irregular or fast heartbeat. You have symptoms of a heart attack. These may include:  · Chest pain or pressure, or a strange feeling in the chest.  · Sweating. · Shortness of breath. · Nausea or vomiting.   · Pain,

## 2018-04-03 ENCOUNTER — OFFICE VISIT (OUTPATIENT)
Dept: CARDIOLOGY CLINIC | Age: 78
End: 2018-04-03

## 2018-04-03 VITALS
DIASTOLIC BLOOD PRESSURE: 70 MMHG | BODY MASS INDEX: 34.96 KG/M2 | SYSTOLIC BLOOD PRESSURE: 130 MMHG | HEART RATE: 58 BPM | WEIGHT: 185 LBS

## 2018-04-03 DIAGNOSIS — I10 ESSENTIAL HYPERTENSION: ICD-10-CM

## 2018-04-03 DIAGNOSIS — I50.32 CHRONIC DIASTOLIC CONGESTIVE HEART FAILURE (HCC): Primary | ICD-10-CM

## 2018-04-03 DIAGNOSIS — R60.0 BILATERAL LOWER EXTREMITY EDEMA: ICD-10-CM

## 2018-04-03 PROBLEM — I50.9 CHF (CONGESTIVE HEART FAILURE) (HCC): Status: RESOLVED | Noted: 2017-10-03 | Resolved: 2018-04-03

## 2018-04-03 PROCEDURE — G8427 DOCREV CUR MEDS BY ELIG CLIN: HCPCS | Performed by: NURSE PRACTITIONER

## 2018-04-03 PROCEDURE — 1090F PRES/ABSN URINE INCON ASSESS: CPT | Performed by: NURSE PRACTITIONER

## 2018-04-03 PROCEDURE — G8400 PT W/DXA NO RESULTS DOC: HCPCS | Performed by: NURSE PRACTITIONER

## 2018-04-03 PROCEDURE — 1123F ACP DISCUSS/DSCN MKR DOCD: CPT | Performed by: NURSE PRACTITIONER

## 2018-04-03 PROCEDURE — G8417 CALC BMI ABV UP PARAM F/U: HCPCS | Performed by: NURSE PRACTITIONER

## 2018-04-03 PROCEDURE — 4040F PNEUMOC VAC/ADMIN/RCVD: CPT | Performed by: NURSE PRACTITIONER

## 2018-04-03 PROCEDURE — 99213 OFFICE O/P EST LOW 20 MIN: CPT | Performed by: NURSE PRACTITIONER

## 2018-04-03 PROCEDURE — 1036F TOBACCO NON-USER: CPT | Performed by: NURSE PRACTITIONER

## 2018-04-03 RX ORDER — LOSARTAN POTASSIUM 25 MG/1
25 TABLET ORAL DAILY
Qty: 30 TABLET | Refills: 3
Start: 2018-04-03 | End: 2019-01-11 | Stop reason: SDUPTHER

## 2018-04-03 ASSESSMENT — ENCOUNTER SYMPTOMS
RESPIRATORY NEGATIVE: 1
GASTROINTESTINAL NEGATIVE: 1
EYES NEGATIVE: 1

## 2018-04-12 PROBLEM — R79.89 ELEVATED TROPONIN: Status: RESOLVED | Noted: 2017-10-03 | Resolved: 2018-04-12

## 2018-04-12 PROBLEM — R77.8 ELEVATED TROPONIN: Status: RESOLVED | Noted: 2017-10-03 | Resolved: 2018-04-12

## 2018-06-08 ENCOUNTER — OFFICE VISIT (OUTPATIENT)
Dept: CARDIOLOGY CLINIC | Age: 78
End: 2018-06-08

## 2018-06-08 VITALS
HEART RATE: 64 BPM | DIASTOLIC BLOOD PRESSURE: 72 MMHG | SYSTOLIC BLOOD PRESSURE: 138 MMHG | BODY MASS INDEX: 34.96 KG/M2 | WEIGHT: 185 LBS

## 2018-06-08 DIAGNOSIS — I51.89 DIASTOLIC DYSFUNCTION: ICD-10-CM

## 2018-06-08 DIAGNOSIS — I10 ESSENTIAL HYPERTENSION: ICD-10-CM

## 2018-06-08 DIAGNOSIS — I50.32 CHRONIC DIASTOLIC CONGESTIVE HEART FAILURE (HCC): Primary | ICD-10-CM

## 2018-06-08 PROCEDURE — 1090F PRES/ABSN URINE INCON ASSESS: CPT | Performed by: INTERNAL MEDICINE

## 2018-06-08 PROCEDURE — 1036F TOBACCO NON-USER: CPT | Performed by: INTERNAL MEDICINE

## 2018-06-08 PROCEDURE — 1123F ACP DISCUSS/DSCN MKR DOCD: CPT | Performed by: INTERNAL MEDICINE

## 2018-06-08 PROCEDURE — G8417 CALC BMI ABV UP PARAM F/U: HCPCS | Performed by: INTERNAL MEDICINE

## 2018-06-08 PROCEDURE — G8427 DOCREV CUR MEDS BY ELIG CLIN: HCPCS | Performed by: INTERNAL MEDICINE

## 2018-06-08 PROCEDURE — G8400 PT W/DXA NO RESULTS DOC: HCPCS | Performed by: INTERNAL MEDICINE

## 2018-06-08 PROCEDURE — 99214 OFFICE O/P EST MOD 30 MIN: CPT | Performed by: INTERNAL MEDICINE

## 2018-06-08 PROCEDURE — 4040F PNEUMOC VAC/ADMIN/RCVD: CPT | Performed by: INTERNAL MEDICINE

## 2018-06-08 ASSESSMENT — ENCOUNTER SYMPTOMS
CHEST TIGHTNESS: 0
CHOKING: 0
COUGH: 0
SHORTNESS OF BREATH: 0

## 2018-07-13 ENCOUNTER — TELEPHONE (OUTPATIENT)
Dept: CARDIOLOGY CLINIC | Age: 78
End: 2018-07-13

## 2018-07-13 NOTE — TELEPHONE ENCOUNTER
Guillermina Montelongo from [facility] called to report when patient's labs were originally on 7/11 they were mislabeled and she went out today and  refused to talk to her or let wife been seen because its not her normal nurse SHAMA. Zeynep Martinez is back next week to attempt to draw.  Any questions call [phone]

## 2018-09-11 ENCOUNTER — OFFICE VISIT (OUTPATIENT)
Dept: CARDIOLOGY CLINIC | Age: 78
End: 2018-09-11

## 2018-09-11 VITALS
BODY MASS INDEX: 33.84 KG/M2 | HEART RATE: 60 BPM | WEIGHT: 185 LBS | SYSTOLIC BLOOD PRESSURE: 134 MMHG | DIASTOLIC BLOOD PRESSURE: 70 MMHG

## 2018-09-11 DIAGNOSIS — I51.89 DIASTOLIC DYSFUNCTION: ICD-10-CM

## 2018-09-11 DIAGNOSIS — I50.32 CHRONIC DIASTOLIC CONGESTIVE HEART FAILURE (HCC): Primary | ICD-10-CM

## 2018-09-11 DIAGNOSIS — I10 ESSENTIAL HYPERTENSION: ICD-10-CM

## 2018-09-11 PROCEDURE — G8400 PT W/DXA NO RESULTS DOC: HCPCS | Performed by: INTERNAL MEDICINE

## 2018-09-11 PROCEDURE — 1123F ACP DISCUSS/DSCN MKR DOCD: CPT | Performed by: INTERNAL MEDICINE

## 2018-09-11 PROCEDURE — 99214 OFFICE O/P EST MOD 30 MIN: CPT | Performed by: INTERNAL MEDICINE

## 2018-09-11 PROCEDURE — G8417 CALC BMI ABV UP PARAM F/U: HCPCS | Performed by: INTERNAL MEDICINE

## 2018-09-11 PROCEDURE — 1101F PT FALLS ASSESS-DOCD LE1/YR: CPT | Performed by: INTERNAL MEDICINE

## 2018-09-11 PROCEDURE — 4040F PNEUMOC VAC/ADMIN/RCVD: CPT | Performed by: INTERNAL MEDICINE

## 2018-09-11 PROCEDURE — G8427 DOCREV CUR MEDS BY ELIG CLIN: HCPCS | Performed by: INTERNAL MEDICINE

## 2018-09-11 PROCEDURE — 1090F PRES/ABSN URINE INCON ASSESS: CPT | Performed by: INTERNAL MEDICINE

## 2018-09-11 PROCEDURE — 1036F TOBACCO NON-USER: CPT | Performed by: INTERNAL MEDICINE

## 2018-09-11 RX ORDER — MOMETASONE FUROATE 50 UG/1
2 SPRAY, METERED NASAL PRN
COMMUNITY

## 2018-09-11 RX ORDER — TRAMADOL HYDROCHLORIDE 50 MG/1
50 TABLET ORAL EVERY 6 HOURS PRN
COMMUNITY
End: 2019-01-10 | Stop reason: SDUPTHER

## 2018-09-11 RX ORDER — ACETAMINOPHEN 500 MG
2000 TABLET ORAL DAILY
COMMUNITY

## 2018-09-11 RX ORDER — CIPROFLOXACIN AND DEXAMETHASONE 3; 1 MG/ML; MG/ML
4 SUSPENSION/ DROPS AURICULAR (OTIC) 2 TIMES DAILY
COMMUNITY
End: 2019-07-23

## 2018-09-11 ASSESSMENT — ENCOUNTER SYMPTOMS
CHEST TIGHTNESS: 0
SHORTNESS OF BREATH: 0
CHOKING: 0
COUGH: 0

## 2018-09-11 NOTE — PROGRESS NOTES
Subjective:      Patient ID: Amee Peralta is a 76 y.o. female. HPI Here for follow up CHF/diastolic dysfunction/HTN. No complaints. Wt stable. No sob. No pnd. No orthopnea. No chest pain. No tachycardia/syncope. BP good at home. Past Medical History:   Diagnosis Date    Hypertension      Past Surgical History:   Procedure Laterality Date    CHOLECYSTECTOMY      HYSTERECTOMY       Social History     Social History    Marital status:      Spouse name: N/A    Number of children: N/A    Years of education: N/A     Occupational History    Not on file. Social History Main Topics    Smoking status: Never Smoker    Smokeless tobacco: Never Used    Alcohol use No    Drug use: No    Sexual activity: Not on file     Other Topics Concern    Not on file     Social History Narrative    No narrative on file     FH reviewed, noncontributory    Vitals:    09/11/18 1446   BP: 134/70   Pulse: 60         Review of Systems   Constitutional: Negative for activity change, appetite change and fatigue. Respiratory: Negative for cough, choking, chest tightness and shortness of breath. Cardiovascular: Negative for chest pain, palpitations and leg swelling. Denies PND or orthopnea. No tachycardia or syncope. Neurological: Negative for dizziness, syncope and light-headedness. Psychiatric/Behavioral: Negative for agitation, behavioral problems and confusion. All other systems reviewed and are negative. Objective:   Physical Exam   Constitutional: She is oriented to person, place, and time. She appears well-developed and well-nourished. No distress. HENT:   Head: Normocephalic and atraumatic. Eyes: Conjunctivae and EOM are normal. Right eye exhibits no discharge. Left eye exhibits no discharge. Neck: Normal range of motion. No JVD present. Cardiovascular: Normal rate, regular rhythm, S1 normal, S2 normal and normal heart sounds. Exam reveals no gallop.     No murmur heard.  Pulses:       Radial pulses are 2+ on the right side, and 2+ on the left side. Pulmonary/Chest: Effort normal and breath sounds normal. No respiratory distress. She has no wheezes. She has no rales. Abdominal: Soft. Bowel sounds are normal. There is no tenderness. Musculoskeletal: Normal range of motion. She exhibits edema. Tr edema   Neurological: She is alert and oriented to person, place, and time. Skin: Skin is warm and dry. Psychiatric: She has a normal mood and affect. Her behavior is normal. Thought content normal.       Assessment:       Diagnosis Orders   1. Chronic diastolic congestive heart failure ([address]     2. Essential hypertension     3. Diastolic dysfunction             Plan:      CV stable. Remains compensated. Watching wt/edema. bp is good. No changes. Reviewed previous records and testing including echo 10/17. Continue to monitor. Follow up 3 months.

## 2019-01-10 DIAGNOSIS — N18.4 CKD (CHRONIC KIDNEY DISEASE) STAGE 4, GFR 15-29 ML/MIN (HCC): ICD-10-CM

## 2019-01-10 DIAGNOSIS — G89.4 CHRONIC PAIN SYNDROME: ICD-10-CM

## 2019-01-10 LAB
ALBUMIN SERPL-MCNC: 4 G/DL (ref 3.4–5)
ANION GAP SERPL CALCULATED.3IONS-SCNC: 12 MMOL/L (ref 3–16)
BASOPHILS ABSOLUTE: 0.1 K/UL (ref 0–0.2)
BASOPHILS RELATIVE PERCENT: 0.8 %
BUN BLDV-MCNC: 51 MG/DL (ref 7–20)
CALCIUM SERPL-MCNC: 10.5 MG/DL (ref 8.3–10.6)
CHLORIDE BLD-SCNC: 99 MMOL/L (ref 99–110)
CO2: 28 MMOL/L (ref 21–32)
CREAT SERPL-MCNC: 1.5 MG/DL (ref 0.6–1.2)
EOSINOPHILS ABSOLUTE: 0.4 K/UL (ref 0–0.6)
EOSINOPHILS RELATIVE PERCENT: 4.7 %
GFR AFRICAN AMERICAN: 40
GFR NON-AFRICAN AMERICAN: 33
GLUCOSE BLD-MCNC: 105 MG/DL (ref 70–99)
HCT VFR BLD CALC: 37.2 % (ref 36–48)
HEMOGLOBIN: 12.6 G/DL (ref 12–16)
LYMPHOCYTES ABSOLUTE: 2.5 K/UL (ref 1–5.1)
LYMPHOCYTES RELATIVE PERCENT: 30.2 %
MCH RBC QN AUTO: 31.3 PG (ref 26–34)
MCHC RBC AUTO-ENTMCNC: 33.9 G/DL (ref 31–36)
MCV RBC AUTO: 92.4 FL (ref 80–100)
MONOCYTES ABSOLUTE: 0.7 K/UL (ref 0–1.3)
MONOCYTES RELATIVE PERCENT: 8.3 %
NEUTROPHILS ABSOLUTE: 4.6 K/UL (ref 1.7–7.7)
NEUTROPHILS RELATIVE PERCENT: 56 %
PDW BLD-RTO: 12.6 % (ref 12.4–15.4)
PHOSPHORUS: 3.5 MG/DL (ref 2.5–4.9)
PLATELET # BLD: 248 K/UL (ref 135–450)
PMV BLD AUTO: 9 FL (ref 5–10.5)
POTASSIUM SERPL-SCNC: 5.5 MMOL/L (ref 3.5–5.1)
RBC # BLD: 4.03 M/UL (ref 4–5.2)
SODIUM BLD-SCNC: 139 MMOL/L (ref 136–145)
WBC # BLD: 8.2 K/UL (ref 4–11)

## 2019-01-11 RX ORDER — LOSARTAN POTASSIUM 25 MG/1
25 TABLET ORAL DAILY
Qty: 30 TABLET | Refills: 3 | Status: SHIPPED | OUTPATIENT
Start: 2019-01-11 | End: 2019-04-11 | Stop reason: ALTCHOICE

## 2019-01-28 ENCOUNTER — OFFICE VISIT (OUTPATIENT)
Dept: CARDIOLOGY CLINIC | Age: 79
End: 2019-01-28
Payer: MEDICARE

## 2019-01-28 VITALS
HEART RATE: 70 BPM | WEIGHT: 183 LBS | DIASTOLIC BLOOD PRESSURE: 80 MMHG | SYSTOLIC BLOOD PRESSURE: 128 MMHG | BODY MASS INDEX: 33.47 KG/M2

## 2019-01-28 DIAGNOSIS — I10 ESSENTIAL HYPERTENSION: ICD-10-CM

## 2019-01-28 DIAGNOSIS — I50.32 CHRONIC DIASTOLIC CONGESTIVE HEART FAILURE (HCC): Primary | ICD-10-CM

## 2019-01-28 DIAGNOSIS — I51.89 DIASTOLIC DYSFUNCTION: ICD-10-CM

## 2019-01-28 PROCEDURE — G8400 PT W/DXA NO RESULTS DOC: HCPCS | Performed by: INTERNAL MEDICINE

## 2019-01-28 PROCEDURE — G8427 DOCREV CUR MEDS BY ELIG CLIN: HCPCS | Performed by: INTERNAL MEDICINE

## 2019-01-28 PROCEDURE — 1090F PRES/ABSN URINE INCON ASSESS: CPT | Performed by: INTERNAL MEDICINE

## 2019-01-28 PROCEDURE — 4040F PNEUMOC VAC/ADMIN/RCVD: CPT | Performed by: INTERNAL MEDICINE

## 2019-01-28 PROCEDURE — 1036F TOBACCO NON-USER: CPT | Performed by: INTERNAL MEDICINE

## 2019-01-28 PROCEDURE — G8484 FLU IMMUNIZE NO ADMIN: HCPCS | Performed by: INTERNAL MEDICINE

## 2019-01-28 PROCEDURE — 1123F ACP DISCUSS/DSCN MKR DOCD: CPT | Performed by: INTERNAL MEDICINE

## 2019-01-28 PROCEDURE — 99214 OFFICE O/P EST MOD 30 MIN: CPT | Performed by: INTERNAL MEDICINE

## 2019-01-28 PROCEDURE — G8417 CALC BMI ABV UP PARAM F/U: HCPCS | Performed by: INTERNAL MEDICINE

## 2019-01-28 PROCEDURE — 1101F PT FALLS ASSESS-DOCD LE1/YR: CPT | Performed by: INTERNAL MEDICINE

## 2019-01-28 ASSESSMENT — ENCOUNTER SYMPTOMS
SHORTNESS OF BREATH: 0
CHEST TIGHTNESS: 0
CHOKING: 0
COUGH: 0

## 2019-06-11 ENCOUNTER — OFFICE VISIT (OUTPATIENT)
Dept: CARDIOLOGY CLINIC | Age: 79
End: 2019-06-11
Payer: MEDICARE

## 2019-06-11 VITALS — DIASTOLIC BLOOD PRESSURE: 60 MMHG | HEART RATE: 60 BPM | SYSTOLIC BLOOD PRESSURE: 122 MMHG

## 2019-06-11 DIAGNOSIS — I50.32 CHRONIC DIASTOLIC CONGESTIVE HEART FAILURE (HCC): Primary | ICD-10-CM

## 2019-06-11 DIAGNOSIS — I10 ESSENTIAL HYPERTENSION: ICD-10-CM

## 2019-06-11 DIAGNOSIS — I51.89 DIASTOLIC DYSFUNCTION: ICD-10-CM

## 2019-06-11 PROCEDURE — 1036F TOBACCO NON-USER: CPT | Performed by: INTERNAL MEDICINE

## 2019-06-11 PROCEDURE — 1123F ACP DISCUSS/DSCN MKR DOCD: CPT | Performed by: INTERNAL MEDICINE

## 2019-06-11 PROCEDURE — G8400 PT W/DXA NO RESULTS DOC: HCPCS | Performed by: INTERNAL MEDICINE

## 2019-06-11 PROCEDURE — G8417 CALC BMI ABV UP PARAM F/U: HCPCS | Performed by: INTERNAL MEDICINE

## 2019-06-11 PROCEDURE — 1090F PRES/ABSN URINE INCON ASSESS: CPT | Performed by: INTERNAL MEDICINE

## 2019-06-11 PROCEDURE — 99214 OFFICE O/P EST MOD 30 MIN: CPT | Performed by: INTERNAL MEDICINE

## 2019-06-11 PROCEDURE — 4040F PNEUMOC VAC/ADMIN/RCVD: CPT | Performed by: INTERNAL MEDICINE

## 2019-06-11 PROCEDURE — G8427 DOCREV CUR MEDS BY ELIG CLIN: HCPCS | Performed by: INTERNAL MEDICINE

## 2019-06-11 ASSESSMENT — ENCOUNTER SYMPTOMS
COUGH: 0
SHORTNESS OF BREATH: 0
CHOKING: 0
CHEST TIGHTNESS: 0

## 2019-06-11 NOTE — PROGRESS NOTES
Subjective:      Patient ID: Mariella Holloway is a [de-identified] y.o. female. Congestive Heart Failure   Pertinent negatives include no chest pain, fatigue, palpitations or shortness of breath. Here for follow up CHF/diastolic dysfunction/HTN. No new complaints. BP good. No edema. Wt stable. No sob. No pnd. No orthopnea. No chest pain. No tachycardia/syncope. BP good at home. Had episode of syncope about l month ago. Was out 30-40 seconds. Breathing but could not awaken. Renal stopped losartan and started isosorbide. Stopped lasix. Dizziness stopped after lasix stopped. Dizzy for month. Constant.       Past Medical History:   Diagnosis Date    Hypertension      Past Surgical History:   Procedure Laterality Date    CHOLECYSTECTOMY      HYSTERECTOMY       Social History     Socioeconomic History    Marital status:      Spouse name: Not on file    Number of children: Not on file    Years of education: Not on file    Highest education level: Not on file   Occupational History    Not on file   Social Needs    Financial resource strain: Not on file    Food insecurity:     Worry: Not on file     Inability: Not on file    Transportation needs:     Medical: Not on file     Non-medical: Not on file   Tobacco Use    Smoking status: Never Smoker    Smokeless tobacco: Never Used   Substance and Sexual Activity    Alcohol use: No    Drug use: No    Sexual activity: Not on file   Lifestyle    Physical activity:     Days per week: Not on file     Minutes per session: Not on file    Stress: Not on file   Relationships    Social connections:     Talks on phone: Not on file     Gets together: Not on file     Attends Baptism service: Not on file     Active member of club or organization: Not on file     Attends meetings of clubs or organizations: Not on file     Relationship status: Not on file    Intimate partner violence:     Fear of current or ex partner: Not on file     Emotionally abused: Not on file     Physically abused: Not on file     Forced sexual activity: Not on file   Other Topics Concern    Not on file   Social History Narrative    Not on file     FH reviewed personally with pt, noncontributory    Vitals:    06/11/19 1425   BP: 122/60   Pulse: 60   wt not done       Review of Systems   Constitutional: Negative for activity change, appetite change and fatigue. Respiratory: Negative for cough, choking, chest tightness and shortness of breath. Cardiovascular: Negative for chest pain, palpitations and leg swelling. Denies PND or orthopnea. No tachycardia or syncope. Neurological: Positive for syncope. Negative for dizziness and light-headedness. Psychiatric/Behavioral: Negative for agitation, behavioral problems and confusion. All other systems reviewed and are negative. Objective:   Physical Exam   Constitutional: She is oriented to person, place, and time. She appears well-developed and well-nourished. No distress. HENT:   Head: Normocephalic and atraumatic. Eyes: Conjunctivae and EOM are normal. Right eye exhibits no discharge. Left eye exhibits no discharge. Neck: Normal range of motion. No JVD present. Cardiovascular: Normal rate, regular rhythm, S1 normal, S2 normal and normal heart sounds. Exam reveals no gallop. No murmur heard. Pulses:       Radial pulses are 2+ on the right side, and 2+ on the left side. Pulmonary/Chest: Effort normal and breath sounds normal. No respiratory distress. She has no wheezes. She has no rales. Abdominal: Soft. Bowel sounds are normal. There is no tenderness. Musculoskeletal: Normal range of motion. She exhibits edema. Bilateral edema   Neurological: She is alert and oriented to person, place, and time. Skin: Skin is warm and dry. Psychiatric: She has a normal mood and affect. Her behavior is normal. Thought content normal.       Assessment:       Diagnosis Orders   1.  Chronic diastolic congestive heart failure ([address] 2. Essential hypertension     3. Diastolic dysfunction             Plan: Bp good. Edema but lasix stopped by renal.  Wt stable now. Watching wt/edema. They are watching. K was up last time. Renal following. They will manage diuretic. Dizziness resolved after stopping lasix. bp is good. No changes. Reviewed previous records and testing including echo 10/17. Continue to monitor. Follow up 4 months. Will echo.

## 2019-07-02 ENCOUNTER — HOSPITAL ENCOUNTER (OUTPATIENT)
Dept: NON INVASIVE DIAGNOSTICS | Age: 79
Discharge: HOME | End: 2019-07-02
Payer: MEDICARE

## 2019-07-02 LAB
LV EF: 58 %
LVEF MODALITY: NORMAL

## 2019-07-02 PROCEDURE — 93306 TTE W/DOPPLER COMPLETE: CPT

## 2019-07-13 ENCOUNTER — HOSPITAL ENCOUNTER (INPATIENT)
Age: 79
LOS: 5 days | Discharge: HOME | DRG: 242 | End: 2019-07-18
Attending: EMERGENCY MEDICINE | Admitting: INTERNAL MEDICINE
Payer: MEDICARE

## 2019-07-13 ENCOUNTER — APPOINTMENT (OUTPATIENT)
Dept: GENERAL RADIOLOGY | Age: 79
DRG: 242 | End: 2019-07-13
Payer: MEDICARE

## 2019-07-13 DIAGNOSIS — I44.2 COMPLETE HEART BLOCK (HCC): Primary | ICD-10-CM

## 2019-07-13 PROBLEM — R00.1 BRADYCARDIA: Status: ACTIVE | Noted: 2019-07-13

## 2019-07-13 LAB
ABO/RH: NORMAL
ANION GAP SERPL CALCULATED.3IONS-SCNC: 12 MMOL/L (ref 3–16)
ANTIBODY SCREEN: NORMAL
APTT: 31.2 SEC (ref 26–36)
BASOPHILS ABSOLUTE: 0 K/UL (ref 0–0.2)
BASOPHILS RELATIVE PERCENT: 0.3 %
BUN BLDV-MCNC: 78 MG/DL (ref 7–20)
CALCIUM SERPL-MCNC: 10.3 MG/DL (ref 8.3–10.6)
CHLORIDE BLD-SCNC: 103 MMOL/L (ref 99–110)
CO2: 24 MMOL/L (ref 21–32)
CREAT SERPL-MCNC: 2.5 MG/DL (ref 0.6–1.2)
EOSINOPHILS ABSOLUTE: 0 K/UL (ref 0–0.6)
EOSINOPHILS RELATIVE PERCENT: 0.2 %
GFR AFRICAN AMERICAN: 22
GFR NON-AFRICAN AMERICAN: 18
GLUCOSE BLD-MCNC: 109 MG/DL (ref 70–99)
HCT VFR BLD CALC: 34.9 % (ref 36–48)
HEMOGLOBIN: 11.6 G/DL (ref 12–16)
INR BLD: 1.01 (ref 0.86–1.14)
LYMPHOCYTES ABSOLUTE: 2.2 K/UL (ref 1–5.1)
LYMPHOCYTES RELATIVE PERCENT: 19.6 %
MCH RBC QN AUTO: 30.7 PG (ref 26–34)
MCHC RBC AUTO-ENTMCNC: 33.2 G/DL (ref 31–36)
MCV RBC AUTO: 92.5 FL (ref 80–100)
MONOCYTES ABSOLUTE: 0.6 K/UL (ref 0–1.3)
MONOCYTES RELATIVE PERCENT: 5.5 %
NEUTROPHILS ABSOLUTE: 8.5 K/UL (ref 1.7–7.7)
NEUTROPHILS RELATIVE PERCENT: 74.4 %
PDW BLD-RTO: 12.6 % (ref 12.4–15.4)
PLATELET # BLD: 205 K/UL (ref 135–450)
PMV BLD AUTO: 9.1 FL (ref 5–10.5)
POTASSIUM REFLEX MAGNESIUM: 5.2 MMOL/L (ref 3.5–5.1)
PRO-BNP: 8139 PG/ML (ref 0–449)
PROTHROMBIN TIME: 11.5 SEC (ref 9.8–13)
RBC # BLD: 3.78 M/UL (ref 4–5.2)
SODIUM BLD-SCNC: 139 MMOL/L (ref 136–145)
TROPONIN: 0.14 NG/ML
TROPONIN: 0.14 NG/ML
WBC # BLD: 11.3 K/UL (ref 4–11)

## 2019-07-13 PROCEDURE — 83880 ASSAY OF NATRIURETIC PEPTIDE: CPT

## 2019-07-13 PROCEDURE — 2580000003 HC RX 258: Performed by: STUDENT IN AN ORGANIZED HEALTH CARE EDUCATION/TRAINING PROGRAM

## 2019-07-13 PROCEDURE — 86850 RBC ANTIBODY SCREEN: CPT

## 2019-07-13 PROCEDURE — 2700000000 HC OXYGEN THERAPY PER DAY

## 2019-07-13 PROCEDURE — 99223 1ST HOSP IP/OBS HIGH 75: CPT | Performed by: INTERNAL MEDICINE

## 2019-07-13 PROCEDURE — 84484 ASSAY OF TROPONIN QUANT: CPT

## 2019-07-13 PROCEDURE — 85610 PROTHROMBIN TIME: CPT

## 2019-07-13 PROCEDURE — 93005 ELECTROCARDIOGRAM TRACING: CPT | Performed by: EMERGENCY MEDICINE

## 2019-07-13 PROCEDURE — 6360000002 HC RX W HCPCS

## 2019-07-13 PROCEDURE — 71045 X-RAY EXAM CHEST 1 VIEW: CPT

## 2019-07-13 PROCEDURE — 80048 BASIC METABOLIC PNL TOTAL CA: CPT

## 2019-07-13 PROCEDURE — 86901 BLOOD TYPING SEROLOGIC RH(D): CPT

## 2019-07-13 PROCEDURE — 85025 COMPLETE CBC W/AUTO DIFF WBC: CPT

## 2019-07-13 PROCEDURE — 86900 BLOOD TYPING SEROLOGIC ABO: CPT

## 2019-07-13 PROCEDURE — 99291 CRITICAL CARE FIRST HOUR: CPT

## 2019-07-13 PROCEDURE — 6360000002 HC RX W HCPCS: Performed by: STUDENT IN AN ORGANIZED HEALTH CARE EDUCATION/TRAINING PROGRAM

## 2019-07-13 PROCEDURE — 6370000000 HC RX 637 (ALT 250 FOR IP): Performed by: INTERNAL MEDICINE

## 2019-07-13 PROCEDURE — 2000000000 HC ICU R&B

## 2019-07-13 PROCEDURE — 36415 COLL VENOUS BLD VENIPUNCTURE: CPT

## 2019-07-13 PROCEDURE — 85730 THROMBOPLASTIN TIME PARTIAL: CPT

## 2019-07-13 PROCEDURE — 94761 N-INVAS EAR/PLS OXIMETRY MLT: CPT

## 2019-07-13 RX ORDER — ACETAMINOPHEN 325 MG/1
650 TABLET ORAL EVERY 4 HOURS PRN
Status: DISCONTINUED | OUTPATIENT
Start: 2019-07-13 | End: 2019-07-13

## 2019-07-13 RX ORDER — DOPAMINE HYDROCHLORIDE 160 MG/100ML
INJECTION, SOLUTION INTRAVENOUS
Status: COMPLETED
Start: 2019-07-13 | End: 2019-07-13

## 2019-07-13 RX ORDER — HEPARIN SODIUM 5000 [USP'U]/ML
5000 INJECTION, SOLUTION INTRAVENOUS; SUBCUTANEOUS EVERY 8 HOURS SCHEDULED
Status: DISCONTINUED | OUTPATIENT
Start: 2019-07-13 | End: 2019-07-16

## 2019-07-13 RX ORDER — ONDANSETRON 2 MG/ML
4 INJECTION INTRAMUSCULAR; INTRAVENOUS EVERY 6 HOURS PRN
Status: DISCONTINUED | OUTPATIENT
Start: 2019-07-13 | End: 2019-07-18 | Stop reason: HOSPADM

## 2019-07-13 RX ORDER — SODIUM CHLORIDE 0.9 % (FLUSH) 0.9 %
10 SYRINGE (ML) INJECTION PRN
Status: DISCONTINUED | OUTPATIENT
Start: 2019-07-13 | End: 2019-07-18 | Stop reason: HOSPADM

## 2019-07-13 RX ORDER — TRAMADOL HYDROCHLORIDE 50 MG/1
50 TABLET ORAL 2 TIMES DAILY
Status: DISCONTINUED | OUTPATIENT
Start: 2019-07-13 | End: 2019-07-18 | Stop reason: HOSPADM

## 2019-07-13 RX ORDER — ACETAMINOPHEN 500 MG
500 TABLET ORAL EVERY 4 HOURS PRN
Status: DISCONTINUED | OUTPATIENT
Start: 2019-07-13 | End: 2019-07-14

## 2019-07-13 RX ORDER — SODIUM CHLORIDE 0.9 % (FLUSH) 0.9 %
10 SYRINGE (ML) INJECTION EVERY 12 HOURS SCHEDULED
Status: DISCONTINUED | OUTPATIENT
Start: 2019-07-13 | End: 2019-07-18 | Stop reason: HOSPADM

## 2019-07-13 RX ADMIN — TRAMADOL HYDROCHLORIDE 50 MG: 50 TABLET, FILM COATED ORAL at 21:00

## 2019-07-13 RX ADMIN — Medication 10 ML: at 20:05

## 2019-07-13 RX ADMIN — ACETAMINOPHEN 500 MG: 500 TABLET ORAL at 21:00

## 2019-07-13 RX ADMIN — HEPARIN SODIUM 5000 UNITS: 5000 INJECTION INTRAVENOUS; SUBCUTANEOUS at 17:50

## 2019-07-13 RX ADMIN — DOPAMINE HYDROCHLORIDE 400000 MCG: 160 INJECTION, SOLUTION INTRAVENOUS at 23:02

## 2019-07-13 ASSESSMENT — ENCOUNTER SYMPTOMS
CHOKING: 0
PHOTOPHOBIA: 0
CHEST TIGHTNESS: 0
SINUS PRESSURE: 0
RHINORRHEA: 0
FACIAL SWELLING: 0
EYE PAIN: 0
ABDOMINAL DISTENTION: 0
APNEA: 0
VOMITING: 0
SHORTNESS OF BREATH: 0
TROUBLE SWALLOWING: 0
DIARRHEA: 0
COUGH: 0
SORE THROAT: 0
NAUSEA: 0
CONSTIPATION: 0
STRIDOR: 0
WHEEZING: 0
ABDOMINAL PAIN: 0

## 2019-07-13 ASSESSMENT — PAIN DESCRIPTION - PAIN TYPE
TYPE: ACUTE PAIN
TYPE: CHRONIC PAIN

## 2019-07-13 ASSESSMENT — PAIN DESCRIPTION - ONSET: ONSET: ON-GOING

## 2019-07-13 ASSESSMENT — PAIN DESCRIPTION - DESCRIPTORS
DESCRIPTORS: ACHING
DESCRIPTORS: ACHING

## 2019-07-13 ASSESSMENT — PAIN SCALES - WONG BAKER: WONGBAKER_NUMERICALRESPONSE: 0

## 2019-07-13 ASSESSMENT — PAIN SCALES - GENERAL
PAINLEVEL_OUTOF10: 5
PAINLEVEL_OUTOF10: 0
PAINLEVEL_OUTOF10: 4
PAINLEVEL_OUTOF10: 0

## 2019-07-13 ASSESSMENT — PAIN DESCRIPTION - PROGRESSION: CLINICAL_PROGRESSION: NOT CHANGED

## 2019-07-13 ASSESSMENT — PAIN DESCRIPTION - LOCATION
LOCATION: LEG
LOCATION: NECK

## 2019-07-13 ASSESSMENT — PAIN DESCRIPTION - ORIENTATION
ORIENTATION: RIGHT;LEFT
ORIENTATION: POSTERIOR

## 2019-07-13 ASSESSMENT — PAIN DESCRIPTION - FREQUENCY: FREQUENCY: CONTINUOUS

## 2019-07-13 ASSESSMENT — PAIN - FUNCTIONAL ASSESSMENT: PAIN_FUNCTIONAL_ASSESSMENT: PREVENTS OR INTERFERES SOME ACTIVE ACTIVITIES AND ADLS

## 2019-07-13 NOTE — H&P
of Systems   Constitutional: Positive for fatigue. Negative for activity change, appetite change, chills, diaphoresis and fever. HENT: Negative for congestion, facial swelling, hearing loss, sinus pressure and trouble swallowing. Eyes: Negative for photophobia and visual disturbance. Respiratory: Negative for apnea, cough, choking, chest tightness, shortness of breath, wheezing and stridor. Cardiovascular: Positive for leg swelling. Negative for chest pain and palpitations. Gastrointestinal: Negative for abdominal distention, abdominal pain, diarrhea, nausea and vomiting. Genitourinary: Negative for difficulty urinating, dysuria and urgency. Musculoskeletal: Negative for neck pain and neck stiffness. Neurological: Negative for dizziness, syncope, speech difficulty, weakness, light-headedness, numbness and headaches. PHYSICAL EXAM:       Vitals: BP (!) 128/95   Pulse (!) 21   Temp 98.3 °F (36.8 °C) (Axillary)   Resp 15   Ht 5' 3\" (1.6 m)   Wt 215 lb (97.5 kg)   SpO2 93%   BMI 38.09 kg/m²     I/O:  No intake or output data in the 24 hours ending 07/13/19 1659  No intake/output data recorded. No intake/output data recorded. Physical Examination:     Physical Exam   Constitutional: She is oriented to person, place, and time. She appears well-developed. No distress. Obese. HENT:   Head: Normocephalic and atraumatic. Eyes: Pupils are equal, round, and reactive to light. Conjunctivae and EOM are normal.   Neck: Normal range of motion. Neck supple. Cardiovascular: Normal rate, regular rhythm, normal heart sounds and intact distal pulses. Exam reveals no gallop and no friction rub. No murmur heard. Pulmonary/Chest: Effort normal and breath sounds normal. No stridor. No respiratory distress. She has no wheezes. She has no rales. She exhibits no tenderness. Abdominal: Soft. Bowel sounds are normal. She exhibits no distension and no mass. There is no tenderness.  There is no rebound and no guarding. Musculoskeletal: Normal range of motion. She exhibits edema (bilateral lower extremity ). Neurological: She is alert and oriented to person, place, and time. Skin: Skin is warm. She is not diaphoretic. Psychiatric: She has a normal mood and affect. Her behavior is normal.   Nursing note and vitals reviewed. DATA:       Labs:  CBC:   Recent Labs     07/13/19  1326   WBC 11.3*   HGB 11.6*   HCT 34.9*          BMP:   Recent Labs     07/13/19  1326      K 5.2*      CO2 24   BUN 78*   CREATININE 2.5*   GLUCOSE 109*     LFT's: No results for input(s): AST, ALT, ALB, BILITOT, ALKPHOS in the last 72 hours. Troponin:   Recent Labs     07/13/19  1326   TROPONINI 0.14*     BNP:No results for input(s): BNP in the last 72 hours. ABGs: No results for input(s): PHART, FVN6TJU, PO2ART in the last 72 hours. INR:   Recent Labs     07/13/19  1326   INR 1.01       U/A:No results for input(s): NITRITE, COLORU, PHUR, LABCAST, WBCUA, RBCUA, MUCUS, TRICHOMONAS, YEAST, BACTERIA, CLARITYU, SPECGRAV, LEUKOCYTESUR, UROBILINOGEN, BILIRUBINUR, BLOODU, GLUCOSEU, AMORPHOUS in the last 72 hours. Invalid input(s): KETONESU    XR CHEST PORTABLE   Final Result      No focal airspace consolidation. EKG:   Echo:  Micro:     ASSESSMENT AND PLAN:   Kostas Lubin is a [de-identified] y.o. female, who presented with asymptomatic bradycardia found to have complete heart block. Cutaneous external pacemaker at bedside. Asymptomatic Bradycardia secondary to Complete heart kiesha   HR 20s/min. Hemodynamically stable. - Cardiology consult, appreciate their recommendations.    - Cutaneous external pacemaker to be set at 20/min  - Serial trop   - ECG tomorrow     Congestive heart failure   - Continue to monitor   - Hold home meds    HTN  - Hold home meds      Code Status:Full Code (Full Code)   FEN: Cardiac diet   PPX:  HSQ, SCDs  DISPO: ICU     This patient has been staffed and discussed

## 2019-07-13 NOTE — ED PROVIDER NOTES
daily    TRAMADOL (ULTRAM) 50 MG TABLET    Take 1 tablet by mouth 2 times daily for 120 days. Allergies     She is allergic to accupril [quinapril hcl]; anaprox [naproxen sodium]; biaxin [clarithromycin]; buspar [buspirone]; butrans [buprenorphine]; capoten [captopril]; cardene [nicardipine]; cardizem [diltiazem hcl]; ciprofloxacin; codeine; cymbalta [duloxetine hcl]; demerol hcl [meperidine]; doxycycline; elavil [amitriptyline hcl]; entex lq [phenylephrine-guaifenesin]; feldene [piroxicam]; gabapentin; hctz [hydrochlorothiazide]; isoptin [verapamil]; keflex [cephalexin]; lorazepam; lyrica [pregabalin]; meclomen [meclofenamate]; morphine; nabumetone; nucynta [tapentadol]; ofloxacin; peanut butter flavor; percocet [oxycodone-acetaminophen]; premarin [conjugated estrogens]; prinivil [lisinopril]; seldane [terfenadine]; strawberry flavor; sulfa antibiotics; tagamet [cimetidine]; temazepam; terbinafine and related; tetracyclines & related; tramadol; trazodone and nefazodone; voltaren [diclofenac sodium]; zanaflex [tizanidine hcl]; amoxicillin; and penicillins. Physical Exam     INITIAL VITALS: BP: (!) 126/94, Temp: 98.4 °F (36.9 °C), Pulse: (!) 27, Resp: 12, SpO2: 91 %    Physical Exam   Constitutional: She is oriented to person, place, and time. She appears well-developed and well-nourished. No distress. HENT:   Head: Normocephalic and atraumatic. Right Ear: External ear normal.   Left Ear: External ear normal.   Nose: Nose normal.   Mouth/Throat: Oropharynx is clear and moist.   Eyes: Pupils are equal, round, and reactive to light. Conjunctivae and EOM are normal. No scleral icterus. Neck: Normal range of motion. Neck supple. Cardiovascular: Regular rhythm, normal heart sounds and intact distal pulses. Exam reveals no gallop and no friction rub. No murmur heard. bradycardic   Pulmonary/Chest: Effort normal and breath sounds normal. No stridor. No respiratory distress. She has no wheezes.  She has no rales. She exhibits no tenderness. Abdominal: Soft. Bowel sounds are normal. She exhibits no distension and no mass. There is no tenderness. Musculoskeletal: Normal range of motion. She exhibits edema (2+ b/l to thighs). She exhibits no tenderness. Lymphadenopathy:     She has no cervical adenopathy. Neurological: She is alert and oriented to person, place, and time. Skin: Skin is warm and dry. No rash noted. She is not diaphoretic. No erythema. No pallor. Nursing note and vitals reviewed. Diagnostic Results     EKG   3rd degree heart block with HR 21, RBBB morphology, QRS duration 140s, no acute ST or T wave abnormalities. RADIOLOGY:  XR CHEST PORTABLE    (Results Pending)       LABS:   Results for orders placed or performed during the hospital encounter of 07/13/19   CBC Auto Differential   Result Value Ref Range    WBC 11.3 (H) 4.0 - 11.0 K/uL    RBC 3.78 (L) 4.00 - 5.20 M/uL    Hemoglobin 11.6 (L) 12.0 - 16.0 g/dL    Hematocrit 34.9 (L) 36.0 - 48.0 %    MCV 92.5 80.0 - 100.0 fL    MCH 30.7 26.0 - 34.0 pg    MCHC 33.2 31.0 - 36.0 g/dL    RDW 12.6 12.4 - 15.4 %    Platelets 574 161 - 525 K/uL    MPV 9.1 5.0 - 10.5 fL    Neutrophils % 74.4 %    Lymphocytes % 19.6 %    Monocytes % 5.5 %    Eosinophils % 0.2 %    Basophils % 0.3 %    Neutrophils # 8.5 (H) 1.7 - 7.7 K/uL    Lymphocytes # 2.2 1.0 - 5.1 K/uL    Monocytes # 0.6 0.0 - 1.3 K/uL    Eosinophils # 0.0 0.0 - 0.6 K/uL    Basophils # 0.0 0.0 - 0.2 K/uL         RECENT VITALS:  BP: (!) 138/92,Temp: 98.4 °F (36.9 °C), Pulse: (!) 21, Resp: 11, SpO2: 91 %     Procedures     US-guided peripheral IV to R arm    ED Course     Nursing Notes, Past Medical Hx, Past Surgical Hx, Social Hx,Allergies, and Family Hx were reviewed. The patient was given the following medications:  No orders of the defined types were placed in this encounter.       CONSULTS:  IP CONSULT TO CARDIOLOGY    MEDICAL DECISIONMAKING / ASSESSMENT / Lorenzo Woodard is a

## 2019-07-13 NOTE — PROGRESS NOTES
Pt HR 18-20/min. MD aware. Initially instructed to place pt on transcutaneous pacing, but pt unable to tolerate and refused. MD to bedside and aware. Pt remains hooked to cart, but pacing is off per pt direction. Pt remains alert and oriented x 4. Difficulty obtaining accurate BP reading, MD to bedside and aware.

## 2019-07-13 NOTE — CONSULTS
Diagnosis Date Noted    Essential hypertension 10/06/2017     Priority: High    Bilateral lower extremity edema      Priority: High    Chronic diastolic congestive heart failure ([facility] [address] 12/21/2017     Complete heart block at ventricular rate of 24/min. Chronic renal failure and followed by Dr. Sonja Ambrosio. We discussed our options including a temporary transvenous pacemaker. She does have an external pacemaker device on her skin that is not turned down. She wishes not at this time to have a venous pacemaker. I suspect that the metoprolol that she was recently exposed to will probably wear off soon and may allow her normal conduction to occur again. In the meantime we do need to rule out acquired coronary ischemia. #1 cutaneous external pacemaker to be set at 20/min. #2 serial troponin levels  #3 to the intensive care unit  #4 monitor her renal function with Dr. Sonja Ambrosio following  #5 EKG tomorrow    Will call me if she has significant use of her cutaneous pacemaker and at some point I believe she would allow us to place a transvenous pacemaker. Thanks for allowing me the opportunity  to participate in the evaluation and care of your patients.  Please call if we can assist further [phone]    This chart was likely completed using voice recognition technology and may contain unintended grammatical , phraseology,and/or punctuation errors  Triny Lewis M.D., [facility]  7/13/20192:09 PM

## 2019-07-14 LAB
ALBUMIN SERPL-MCNC: 3.9 G/DL (ref 3.4–5)
ANION GAP SERPL CALCULATED.3IONS-SCNC: 15 MMOL/L (ref 3–16)
BASOPHILS ABSOLUTE: 0.1 K/UL (ref 0–0.2)
BASOPHILS RELATIVE PERCENT: 0.7 %
BUN BLDV-MCNC: 85 MG/DL (ref 7–20)
CALCIUM SERPL-MCNC: 10.8 MG/DL (ref 8.3–10.6)
CHLORIDE BLD-SCNC: 99 MMOL/L (ref 99–110)
CHLORIDE URINE RANDOM: 48 MMOL/L
CO2: 23 MMOL/L (ref 21–32)
CREAT SERPL-MCNC: 2.5 MG/DL (ref 0.6–1.2)
CREATININE URINE: 58.8 MG/DL (ref 28–259)
EKG ATRIAL RATE: 25 BPM
EKG ATRIAL RATE: 40 BPM
EKG DIAGNOSIS: NORMAL
EKG DIAGNOSIS: NORMAL
EKG Q-T INTERVAL: 492 MS
EKG Q-T INTERVAL: 582 MS
EKG QRS DURATION: 144 MS
EKG QRS DURATION: 194 MS
EKG QTC CALCULATION (BAZETT): 375 MS
EKG QTC CALCULATION (BAZETT): 772 MS
EKG R AXIS: -59 DEGREES
EKG R AXIS: 20 DEGREES
EKG T AXIS: 11 DEGREES
EKG T AXIS: 98 DEGREES
EKG VENTRICULAR RATE: 148 BPM
EKG VENTRICULAR RATE: 25 BPM
EOSINOPHILS ABSOLUTE: 0.1 K/UL (ref 0–0.6)
EOSINOPHILS RELATIVE PERCENT: 0.9 %
GFR AFRICAN AMERICAN: 22
GFR NON-AFRICAN AMERICAN: 18
GLUCOSE BLD-MCNC: 103 MG/DL (ref 70–99)
HCT VFR BLD CALC: 39.9 % (ref 36–48)
HEMOGLOBIN: 13.3 G/DL (ref 12–16)
LACTIC ACID: 0.7 MMOL/L (ref 0.4–2)
LYMPHOCYTES ABSOLUTE: 1.9 K/UL (ref 1–5.1)
LYMPHOCYTES RELATIVE PERCENT: 15.7 %
MAGNESIUM: 3.2 MG/DL (ref 1.8–2.4)
MCH RBC QN AUTO: 31.2 PG (ref 26–34)
MCHC RBC AUTO-ENTMCNC: 33.3 G/DL (ref 31–36)
MCV RBC AUTO: 93.7 FL (ref 80–100)
MONOCYTES ABSOLUTE: 0.8 K/UL (ref 0–1.3)
MONOCYTES RELATIVE PERCENT: 6.2 %
NEUTROPHILS ABSOLUTE: 9.5 K/UL (ref 1.7–7.7)
NEUTROPHILS RELATIVE PERCENT: 76.5 %
PDW BLD-RTO: 12.9 % (ref 12.4–15.4)
PHOSPHORUS: 4 MG/DL (ref 2.5–4.9)
PLATELET # BLD: 124 K/UL (ref 135–450)
PMV BLD AUTO: 9.3 FL (ref 5–10.5)
POTASSIUM SERPL-SCNC: 4.8 MMOL/L (ref 3.5–5.1)
PROTEIN PROTEIN: 26.4 MG/DL
RBC # BLD: 4.26 M/UL (ref 4–5.2)
SODIUM BLD-SCNC: 137 MMOL/L (ref 136–145)
SODIUM URINE: 60 MMOL/L
TROPONIN: 0.15 NG/ML
TROPONIN: 0.16 NG/ML
TSH REFLEX: 3.01 UIU/ML (ref 0.27–4.2)
WBC # BLD: 12.4 K/UL (ref 4–11)

## 2019-07-14 PROCEDURE — 2580000003 HC RX 258: Performed by: STUDENT IN AN ORGANIZED HEALTH CARE EDUCATION/TRAINING PROGRAM

## 2019-07-14 PROCEDURE — 6360000002 HC RX W HCPCS: Performed by: INTERNAL MEDICINE

## 2019-07-14 PROCEDURE — 36415 COLL VENOUS BLD VENIPUNCTURE: CPT

## 2019-07-14 PROCEDURE — 84443 ASSAY THYROID STIM HORMONE: CPT

## 2019-07-14 PROCEDURE — 99221 1ST HOSP IP/OBS SF/LOW 40: CPT | Performed by: INTERNAL MEDICINE

## 2019-07-14 PROCEDURE — 84484 ASSAY OF TROPONIN QUANT: CPT

## 2019-07-14 PROCEDURE — 6370000000 HC RX 637 (ALT 250 FOR IP): Performed by: INTERNAL MEDICINE

## 2019-07-14 PROCEDURE — 82570 ASSAY OF URINE CREATININE: CPT

## 2019-07-14 PROCEDURE — 82436 ASSAY OF URINE CHLORIDE: CPT

## 2019-07-14 PROCEDURE — 83605 ASSAY OF LACTIC ACID: CPT

## 2019-07-14 PROCEDURE — 93010 ELECTROCARDIOGRAM REPORT: CPT | Performed by: INTERNAL MEDICINE

## 2019-07-14 PROCEDURE — 6360000002 HC RX W HCPCS

## 2019-07-14 PROCEDURE — 6360000002 HC RX W HCPCS: Performed by: STUDENT IN AN ORGANIZED HEALTH CARE EDUCATION/TRAINING PROGRAM

## 2019-07-14 PROCEDURE — 84156 ASSAY OF PROTEIN URINE: CPT

## 2019-07-14 PROCEDURE — 2500000003 HC RX 250 WO HCPCS

## 2019-07-14 PROCEDURE — 83735 ASSAY OF MAGNESIUM: CPT

## 2019-07-14 PROCEDURE — 2720000010 HC SURG SUPPLY STERILE

## 2019-07-14 PROCEDURE — 2000000000 HC ICU R&B

## 2019-07-14 PROCEDURE — 33210 INSERT ELECTRD/PM CATH SNGL: CPT

## 2019-07-14 PROCEDURE — 84300 ASSAY OF URINE SODIUM: CPT

## 2019-07-14 PROCEDURE — 93005 ELECTROCARDIOGRAM TRACING: CPT | Performed by: STUDENT IN AN ORGANIZED HEALTH CARE EDUCATION/TRAINING PROGRAM

## 2019-07-14 PROCEDURE — 33210 INSERT ELECTRD/PM CATH SNGL: CPT | Performed by: INTERNAL MEDICINE

## 2019-07-14 PROCEDURE — 80069 RENAL FUNCTION PANEL: CPT

## 2019-07-14 PROCEDURE — 99233 SBSQ HOSP IP/OBS HIGH 50: CPT | Performed by: INTERNAL MEDICINE

## 2019-07-14 PROCEDURE — 6370000000 HC RX 637 (ALT 250 FOR IP): Performed by: STUDENT IN AN ORGANIZED HEALTH CARE EDUCATION/TRAINING PROGRAM

## 2019-07-14 PROCEDURE — 85025 COMPLETE CBC W/AUTO DIFF WBC: CPT

## 2019-07-14 PROCEDURE — C1894 INTRO/SHEATH, NON-LASER: HCPCS

## 2019-07-14 RX ORDER — HYDRALAZINE HYDROCHLORIDE 20 MG/ML
5 INJECTION INTRAMUSCULAR; INTRAVENOUS EVERY 4 HOURS PRN
Status: DISCONTINUED | OUTPATIENT
Start: 2019-07-14 | End: 2019-07-18

## 2019-07-14 RX ORDER — LIDOCAINE 4 G/G
1 PATCH TOPICAL DAILY
Status: DISCONTINUED | OUTPATIENT
Start: 2019-07-14 | End: 2019-07-18 | Stop reason: HOSPADM

## 2019-07-14 RX ORDER — PHENTOLAMINE MESYLATE 5 MG/1
5 INJECTION INTRAMUSCULAR; INTRAVENOUS ONCE
Status: COMPLETED | OUTPATIENT
Start: 2019-07-14 | End: 2019-07-14

## 2019-07-14 RX ORDER — CHLORDIAZEPOXIDE HYDROCHLORIDE AND CLIDINIUM BROMIDE 5; 2.5 MG/1; MG/1
1 CAPSULE ORAL ONCE
Status: DISCONTINUED | OUTPATIENT
Start: 2019-07-14 | End: 2019-07-14

## 2019-07-14 RX ORDER — ATROPINE SULFATE 1 MG/ML
1 INJECTION, SOLUTION INTRAMUSCULAR; INTRAVENOUS; SUBCUTANEOUS ONCE
Status: DISCONTINUED | OUTPATIENT
Start: 2019-07-14 | End: 2019-07-17

## 2019-07-14 RX ORDER — ACETAMINOPHEN 500 MG
1000 TABLET ORAL
Status: DISCONTINUED | OUTPATIENT
Start: 2019-07-14 | End: 2019-07-18 | Stop reason: HOSPADM

## 2019-07-14 RX ORDER — ATROPINE SULFATE 0.1 MG/ML
INJECTION INTRAVENOUS
Status: COMPLETED
Start: 2019-07-14 | End: 2019-07-14

## 2019-07-14 RX ORDER — CHLORDIAZEPOXIDE HYDROCHLORIDE 5 MG/1
5 CAPSULE, GELATIN COATED ORAL ONCE
Status: COMPLETED | OUTPATIENT
Start: 2019-07-14 | End: 2019-07-14

## 2019-07-14 RX ORDER — CETIRIZINE HYDROCHLORIDE 10 MG/1
5 TABLET ORAL DAILY
Status: DISCONTINUED | OUTPATIENT
Start: 2019-07-14 | End: 2019-07-18 | Stop reason: HOSPADM

## 2019-07-14 RX ORDER — DOPAMINE HYDROCHLORIDE 160 MG/100ML
15 INJECTION, SOLUTION INTRAVENOUS CONTINUOUS
Status: DISCONTINUED | OUTPATIENT
Start: 2019-07-14 | End: 2019-07-16

## 2019-07-14 RX ORDER — SODIUM CHLORIDE 9 MG/ML
INJECTION, SOLUTION INTRAVENOUS
Status: DISPENSED
Start: 2019-07-14 | End: 2019-07-14

## 2019-07-14 RX ORDER — ACETAMINOPHEN 500 MG
500 TABLET ORAL 2 TIMES DAILY
Status: DISCONTINUED | OUTPATIENT
Start: 2019-07-14 | End: 2019-07-18 | Stop reason: HOSPADM

## 2019-07-14 RX ADMIN — PHENTOLAMINE MESYLATE 5 MG: 5 INJECTION, POWDER, FOR SOLUTION INTRAMUSCULAR; INTRAVENOUS at 04:10

## 2019-07-14 RX ADMIN — Medication 10 ML: at 08:25

## 2019-07-14 RX ADMIN — HEPARIN SODIUM 5000 UNITS: 5000 INJECTION INTRAVENOUS; SUBCUTANEOUS at 06:47

## 2019-07-14 RX ADMIN — HEPARIN SODIUM 5000 UNITS: 5000 INJECTION INTRAVENOUS; SUBCUTANEOUS at 22:09

## 2019-07-14 RX ADMIN — ACETAMINOPHEN 500 MG: 500 TABLET ORAL at 08:15

## 2019-07-14 RX ADMIN — CETIRIZINE HYDROCHLORIDE 5 MG: 10 TABLET, FILM COATED ORAL at 11:15

## 2019-07-14 RX ADMIN — CHLORDIAZEPOXIDE HYDROCHLORIDE 5 MG: 5 CAPSULE ORAL at 22:09

## 2019-07-14 RX ADMIN — HYDRALAZINE HYDROCHLORIDE 5 MG: 20 INJECTION INTRAMUSCULAR; INTRAVENOUS at 21:07

## 2019-07-14 RX ADMIN — ACETAMINOPHEN 1000 MG: 500 TABLET ORAL at 12:12

## 2019-07-14 RX ADMIN — Medication 10 ML: at 19:52

## 2019-07-14 RX ADMIN — TRAMADOL HYDROCHLORIDE 50 MG: 50 TABLET, FILM COATED ORAL at 08:15

## 2019-07-14 RX ADMIN — ATROPINE SULFATE 1 MG: 0.1 INJECTION, SOLUTION INTRAVENOUS at 00:05

## 2019-07-14 RX ADMIN — ACETAMINOPHEN 500 MG: 500 TABLET ORAL at 19:51

## 2019-07-14 RX ADMIN — HYDRALAZINE HYDROCHLORIDE 5 MG: 20 INJECTION INTRAMUSCULAR; INTRAVENOUS at 02:23

## 2019-07-14 RX ADMIN — TRAMADOL HYDROCHLORIDE 50 MG: 50 TABLET, FILM COATED ORAL at 19:52

## 2019-07-14 RX ADMIN — HEPARIN SODIUM 5000 UNITS: 5000 INJECTION INTRAVENOUS; SUBCUTANEOUS at 14:10

## 2019-07-14 ASSESSMENT — ENCOUNTER SYMPTOMS
RESPIRATORY NEGATIVE: 1
GASTROINTESTINAL NEGATIVE: 1
CHEST TIGHTNESS: 0
COUGH: 0
SHORTNESS OF BREATH: 0
EYES NEGATIVE: 1
WHEEZING: 0

## 2019-07-14 ASSESSMENT — PAIN DESCRIPTION - PROGRESSION
CLINICAL_PROGRESSION: GRADUALLY WORSENING

## 2019-07-14 ASSESSMENT — PAIN SCALES - GENERAL
PAINLEVEL_OUTOF10: 0
PAINLEVEL_OUTOF10: 10
PAINLEVEL_OUTOF10: 6
PAINLEVEL_OUTOF10: 0
PAINLEVEL_OUTOF10: 7
PAINLEVEL_OUTOF10: 10
PAINLEVEL_OUTOF10: 10
PAINLEVEL_OUTOF10: 9

## 2019-07-14 ASSESSMENT — PAIN DESCRIPTION - ONSET
ONSET: ON-GOING

## 2019-07-14 ASSESSMENT — PAIN DESCRIPTION - DESCRIPTORS
DESCRIPTORS: ACHING

## 2019-07-14 ASSESSMENT — PAIN DESCRIPTION - LOCATION
LOCATION: LEG
LOCATION: KNEE
LOCATION: KNEE;HIP;SHOULDER

## 2019-07-14 ASSESSMENT — PAIN DESCRIPTION - PAIN TYPE
TYPE: CHRONIC PAIN

## 2019-07-14 ASSESSMENT — PAIN DESCRIPTION - FREQUENCY
FREQUENCY: CONTINUOUS

## 2019-07-14 ASSESSMENT — PAIN DESCRIPTION - ORIENTATION
ORIENTATION: RIGHT;LEFT

## 2019-07-14 ASSESSMENT — PAIN - FUNCTIONAL ASSESSMENT: PAIN_FUNCTIONAL_ASSESSMENT: PREVENTS OR INTERFERES SOME ACTIVE ACTIVITIES AND ADLS

## 2019-07-14 NOTE — PROGRESS NOTES
Nephrology Consult Note                                                                                                                                                                                                                                                                                                                                                               Office : [phone]     Fax :[phone]              Patient's Name: Governor Juan Corbin stable   Good UO       Wt Readings from Last 3 Encounters:   07/14/19 206 lb 2.1 oz (93.5 kg)   07/11/19 199 lb (90.3 kg)   04/11/19 185 lb (83.9 kg)       Past Medical History:   Diagnosis Date    CHF (congestive heart failure) (HCC)     Hypertension        Past Surgical History:   Procedure Laterality Date    CHOLECYSTECTOMY      HYSTERECTOMY         History reviewed. No pertinent family history. reports that she has never smoked. She has never used smokeless tobacco. She reports that she does not drink alcohol or use drugs. Allergies:  Accupril [quinapril hcl]; Anaprox [naproxen sodium]; Biaxin [clarithromycin]; Buspar [buspirone]; Butrans [buprenorphine]; Capoten [captopril]; Cardene [nicardipine]; Cardizem [diltiazem hcl]; Ciprofloxacin; Codeine; Cymbalta [duloxetine hcl]; Demerol hcl [meperidine]; Doxycycline; Elavil [amitriptyline hcl]; Entex lq [phenylephrine-guaifenesin]; Feldene [piroxicam]; Gabapentin; Hctz [hydrochlorothiazide]; Isoptin [verapamil]; Keflex [cephalexin]; Lorazepam; Lyrica [pregabalin]; Meclomen [meclofenamate]; Morphine; Nabumetone; Nucynta [tapentadol]; Ofloxacin; Peanut butter flavor; Percocet [oxycodone-acetaminophen]; Premarin [conjugated estrogens]; Prinivil [lisinopril]; Seldane [terfenadine]; Strawberry flavor; Sulfa antibiotics; Tagamet [cimetidine]; Temazepam; Terbinafine and related; Tetracyclines & related; Trazodone and nefazodone; Voltaren [diclofenac sodium]; Zanaflex [tizanidine hcl];  Amoxicillin; and Penicillins    Current Medications:      atropine injection 1 mg Once   DOPamine (INTROPIN) 400 mg in dextrose 5 % 250 mL infusion Continuous   hydrALAZINE (APRESOLINE) injection 5 mg Q4H PRN   lidocaine 4 % external patch 1 patch Daily   acetaminophen (TYLENOL) tablet 1,000 mg Daily   acetaminophen (TYLENOL) tablet 500 mg BID   cetirizine (ZYRTEC) tablet 5 mg Daily   sodium chloride 0.9 % infusion    sodium chloride flush 0.9 % injection 10 mL 2 times per day   sodium chloride flush 0.9 % injection 10 mL PRN   magnesium hydroxide (MILK OF MAGNESIA) 400 MG/5ML suspension 30 mL Daily PRN   ondansetron (ZOFRAN) injection 4 mg Q6H PRN   heparin (porcine) injection 5,000 Units 3 times per day   traMADol (ULTRAM) tablet 50 mg BID       Review of Systems:   14 point ROS obtained but were negative except mentioned in HPI      Physical exam:     Vitals:  BP (!) 156/71   Pulse 80   Temp 97.9 °F (36.6 °C) (Axillary)   Resp 16   Ht 5' 3\" (1.6 m)   Wt 206 lb 2.1 oz (93.5 kg)   SpO2 99%   BMI 36.51 kg/m²   Constitutional:  OAA X3 NAD  Skin: no rash, turgor wnl  Heent:  eomi, mmm  Neck: no bruits or jvd noted  Cardiovascular:  S1, S2 heard   Respiratory: CTA B without w/r/r  Abdomen:  +bs, soft, nt, nd  Ext: + lower extremity edema  Psychiatric: mood and affect appropriate  Musculoskeletal:  Rom, muscular strength intact    Data:   Labs:  CBC:   Recent Labs     07/13/19  1326 07/14/19  0400   WBC 11.3* 12.4*   HGB 11.6* 13.3    124*     BMP:    Recent Labs     07/13/19  1326 07/14/19  0400    137   K 5.2* 4.8    99   CO2 24 23   BUN 78* 85*   CREATININE 2.5* 2.5*   GLUCOSE 109* 103*     Ca/Mg/Phos:   Recent Labs     07/13/19  1326 07/14/19  0400   CALCIUM 10.3 10.8*   MG  --  3.20*   PHOS  --  4.0     Hepatic: No results for input(s): AST, ALT, ALB, BILITOT, ALKPHOS in the last 72 hours.   Troponin:   Recent Labs     07/13/19  1800 07/14/19  0022 07/14/19  0400   TROPONINI 0.14* 0.16* 0.15*     BNP: No results for input(s): BNP in the last 72 hours. Lipids: No results for input(s): CHOL, TRIG, HDL, LDLCALC, LABVLDL in the last 72 hours. ABGs: No results for input(s): PHART, PO2ART, DYF6CYL in the last 72 hours. INR:   Recent Labs     07/13/19  1326   INR 1.01     UA:No results for input(s): Cherylene Gauss, GLUCOSEU, BILIRUBINUR, Chrystie Ou, BLOODU, PHUR, PROTEINU, UROBILINOGEN, NITRU, LEUKOCYTESUR, Kwan Morris in the last 72 hours. Urine Microscopic: No results for input(s): LABCAST, BACTERIA, COMU, HYALCAST, WBCUA, RBCUA, EPIU in the last 72 hours. Urine Culture: No results for input(s): LABURIN in the last 72 hours. Urine Chemistry:   Recent Labs     07/14/19  1111   CLUR 48   PROTEINUR 26.40*   NAUR 60             IMAGING:  XR CHEST PORTABLE   Final Result      No focal airspace consolidation. Assessment/Plan   1. Complete heart block     2. PROSPER on CKD 3     3.  Anemia    4. Acid- base/ Electrolyte imbalance - Hyperkalemia     Plan   - temp placemaker placed   - will need perm pacemaker placement   - PROSPER is sec to Hypoperfusion sec to Bradycardia - CHB   - dopamine as needed   - Ur studies - reviewed   - Monitor UO and renal function   - labs in am   - low K diet         Case d/w ICU team            Thank you for allowing us to participate in care of [address] free to contact me   [facility] [address]  Office : [phone]  Fax :[phone]

## 2019-07-14 NOTE — CONSULTS
Critical Care Consult    Patient's PCP: Deidra Murcia DO  Referred by: Guillermina Godoy MD for symptomatic bradycardia     HISTORY OF PRESENT ILLNESS:    This is a  [de-identified] y.o. female with PMH of CHF and others as listed below presented to the hospital with lightheadedness and fatigue with possible syncopal episode. She was found to have complete heart block. She was admitted to the ICU. Initially she was refusing any intervention, however at night she agreed to proceed with pacemaker placement. This morning she was fully alert. She feels better. She was thankful for the care. She denied any chest pain, SOB or palpitations. Past Medical / Surgical History:    Past Medical History:   Diagnosis Date    CHF (congestive heart failure) ([facility] [address]     Hypertension      Past Surgical History:   Procedure Laterality Date    CHOLECYSTECTOMY      HYSTERECTOMY         Medications Prior to Admission:    No current facility-administered medications on file prior to encounter. Current Outpatient Medications on File Prior to Encounter   Medication Sig Dispense Refill    isosorbide mononitrate (IMDUR) 30 MG extended release tablet Take 1 tablet by mouth daily 30 tablet 3    torsemide (DEMADEX) 20 MG tablet Take 1 tablet by mouth daily 30 tablet 5    traMADol (ULTRAM) 50 MG tablet Take 1 tablet by mouth 2 times daily for 120 days.  240 tablet 0    acetaminophen (TYLENOL) 500 MG tablet Take 500 mg by mouth      ciprofloxacin-dexamethasone (CIPRODEX) 0.3-0.1 % otic suspension 4 drops 2 times daily      mometasone (NASONEX) 50 MCG/ACT nasal spray 2 sprays by Nasal route daily      PROMETHAZINE-DM PO Take by mouth      diclofenac sodium 1 % GEL Apply 4 g topically daily as needed      phenylephrine-cocoa butter (PREPARATION H) 0.25-88.44 % Place 1 suppository rectally as needed for Hemorrhoids      phenylephrine-mineral oil-petrolatum (PREPARATION H) 0.25-14-74.9 % rectal ointment Place rectally 2 times daily as needed for Hemorrhoids      rivaroxaban (XARELTO) 20 MG TABS tablet Take 20 mg by mouth daily (with breakfast)      furosemide (LASIX) 40 MG tablet Take 1 tablet by mouth daily 30 tablet 5    Cranberry 425 MG CAPS Take by mouth      diphenhydrAMINE-APAP 12.5-325 MG/15ML LIQD Take by mouth      metoprolol tartrate (LOPRESSOR) 25 MG tablet Take 1 tablet by mouth 2 times daily 60 tablet 3    lidocaine (LIDODERM) 5 % Place 1 patch onto the skin daily 12 hours on, 12 hours off. 30 patch 0    chlordiazePOXIDE-clidinium (LIBRAX) 5-2.5 MG per capsule Take 1 capsule by mouth 4 times daily as needed      Fexofenadine HCl (ALLEGRA PO) Take 180 mg by mouth      clobetasol (TEMOVATE) 0.05 % cream Apply topically 2 times daily Apply topically 2 times daily.  Omega-3 Fatty Acids (FISH OIL PO) Take 1,200 mg by mouth      hydrOXYzine (VISTARIL) 25 MG capsule Take 25 mg by mouth      magnesium (MAGNESIUM-OXIDE) 250 MG TABS tablet Take 250 mg by mouth daily      Multiple Vitamins-Minerals (MULTIVITAMIN ADULT PO) Take by mouth      esomeprazole Magnesium (NEXIUM) 40 MG PACK Take 40 mg by mouth daily      NYSTATIN EX Apply topically         Allergies:  Accupril [quinapril hcl]; Anaprox [naproxen sodium]; Biaxin [clarithromycin]; Buspar [buspirone]; Butrans [buprenorphine]; Capoten [captopril]; Cardene [nicardipine]; Cardizem [diltiazem hcl]; Ciprofloxacin; Codeine; Cymbalta [duloxetine hcl]; Demerol hcl [meperidine]; Doxycycline; Elavil [amitriptyline hcl]; Entex lq [phenylephrine-guaifenesin]; Feldene [piroxicam]; Gabapentin; Hctz [hydrochlorothiazide]; Isoptin [verapamil]; Keflex [cephalexin]; Lorazepam; Lyrica [pregabalin]; Meclomen [meclofenamate]; Morphine; Nabumetone; Nucynta [tapentadol]; Ofloxacin; Peanut butter flavor; Percocet [oxycodone-acetaminophen]; Premarin [conjugated estrogens]; Prinivil [lisinopril]; Seldane [terfenadine]; Strawberry flavor; Sulfa antibiotics; Tagamet [cimetidine];  Temazepam; 11.6* 13.3   HCT 34.9* 39.9    124*                                                                  Recent Labs     07/13/19  1326 07/14/19  0400    137   K 5.2* 4.8    99   CO2 24 23   BUN 78* 85*   CREATININE 2.5* 2.5*   GLUCOSE 109* 103*     No results for input(s): AST, ALT, ALB, BILITOT, ALKPHOS in the last 72 hours. Recent Labs     07/13/19  1800 07/14/19  0022 07/14/19  0400   TROPONINI 0.14* 0.16* 0.15*     No results for input(s): BNP in the last 72 hours. No results found for: PHART, [id number], PO2ART  Recent Labs     07/13/19  1326   INR 1.01     @LABRCNT(NITRITE,COLORU,PHUR,LABCAST,WBCUA,RBCUA,MUCUS,TRICHOMONAS,YEAST,BACTERIA,CLARITYU,SPECGRAV,LEUKOCYTESUR,UROBILINOGEN,BILIRUBINUR,BLOODU,GLUCOSEU,KETONESU,AMORPHOUS)@     DATA:  Echo on 7/2/19  Summary   Left ventricular cavity size is normal. There is mild left ventricular   hypertrophy.   Overall left ventricular systolic function appears normal with an ejection   fraction of 55-60%.   No diagnostic regional wall motion abnormalities but cannot be completely   excluded.   The aortic valve leaflets appear thickened/calcified but opens well.   Mild tricuspid regurgitation.   Estimated pulmonary artery systolic pressure is at 36 mmHg assuming a right   atrial pressure of 8 mmHg.     CXR on 7/13/19  No focal airspace consolidation    EKG   Complete heart block      Assessment &Plan:    Patient Active Problem List:     Bilateral lower extremity edema     Essential hypertension     Chronic diastolic congestive heart failure (HCC)     Complete heart block (HCC)     Bradycardia      Complete heart block s/p temporary pacemaker placement   PROSPER and NSTEMI secondary to hypoperfusion     Chronic diastolic CHF. Mild leukocytosis, no clear focus of infection     Currently she is hemodynamically stable.     EP will evaluate on Monday for permanent pacemaker   Closely monitor in the ICU    The patient and / or the family were informed of the

## 2019-07-14 NOTE — PROGRESS NOTES
Patient's heart rate remains 20. Dopamine gtt initiated at 5 mcg/kg/min. Patient's  and son at bedside.

## 2019-07-14 NOTE — PROGRESS NOTES
The [address] [place] [address]  Cardiology Daily Progress Note  7/14/2019,11:03 AM      Manjeet Douglas [address], DO  Primary cardiologist:    [address] [place] [address] Date:  7/13/2019  Hospital Day: Hospital Day: 2  Subjective:  Ms. Linsey Dumont was admitted yesterday and complete heart block with heart rate of 24 bpm.  She declined temporary pacemaker insertion. We did place her on the external this maker. During the night she essentially crashed and became progressively hypotensive and the temporary external pacemaker did become involved and she did not like it and elected that time to have a transvenous pacemaker implanted. This was done in the night and she improved immediately. Today she is awake and alert and other than back pain is in no distress. The rate is 80/min completely paced. It does drop down to 24 and we turned the pacemaker down. It is unclear whether this is a permanent problem or if she will need a permanent pacemaker once the metoprolol has metabolized. Objective:   BP (!) 155/69   Pulse 80   Temp 97.7 °F (36.5 °C) (Oral)   Resp 23   Ht 5' 3\" (1.6 m)   Wt 206 lb 2.1 oz (93.5 kg)   SpO2 100%   BMI 36.51 kg/m²       Intake/Output Summary (Last 24 hours) at 7/14/2019 1103  Last data filed at 7/14/2019 0645  Gross per 24 hour   Intake 683 ml   Output 750 ml   Net -67 ml       TELEMETRY: Today ventricular paced at 80/min  Physical Examination:    Vitals:    07/14/19 0645 07/14/19 0700 07/14/19 0800 07/14/19 0830   BP:  (!) 150/59 (!) 149/60 (!) 155/69   Pulse: 80 80 80 80   Resp: 14 17 14 23   Temp:   97.7 °F (36.5 °C)    TempSrc:   Oral    SpO2: 95% 94% 95% 100%   Weight:       Height:            Constitutional and General Appearance:  Healthy. And alert . Moderately obese  HEENT: eyes and ears intact.  No nasal masses  THYROID: not enlarged  LUNGS:  · Clear to auscultation and percussion  HEART and VASCULAR:  · The apical impulses not displaced  · Heart tones are crisp and normal  · Cervical veins are not engorged  · The carotid upstroke is normal in amplitude and contour without delay or bruit  · Peripheral pulses are symmetrical and full  · There is no clubbing, cyanosis of the extremities. · No peripheral edema  · Femoral Arteries: 2+ and equal  · Pedal Pulses: 2+ and equal   ABDOMEN[de-identified]  · No masses or tenderness  · Liver/Spleen: No Abnormalities Noted  NEUROLOGICAL:  . Moves all extremities to command. Cranial nerves 2-12 are in tact. PSYCHIATRIC: alert and lucid and oriented and appropriate  SKIN: No lesions or rashes  LYMPH NODES: none enlarged      Medications:    atropine  1 mg Intravenous Once    lidocaine  1 patch Transdermal Daily    acetaminophen  1,000 mg Oral Daily    acetaminophen  500 mg Oral BID    cetirizine  5 mg Oral Daily    sodium chloride flush  10 mL Intravenous 2 times per day    heparin (porcine)  5,000 Units Subcutaneous 3 times per day    traMADol  50 mg Oral BID      sodium chloride      DOPamine Stopped (07/14/19 0100)     hydrALAZINE, sodium chloride flush, magnesium hydroxide, ondansetron    Lab Data:  CBC:   Recent Labs     07/13/19  1326 07/14/19  0400   WBC 11.3* 12.4*   HGB 11.6* 13.3   HCT 34.9* 39.9   MCV 92.5 93.7    124*     BMP:   Recent Labs     07/13/19  1326 07/14/19  0400    137   K 5.2* 4.8    99   CO2 24 23   PHOS  --  4.0   BUN 78* 85*   CREATININE 2.5* 2.5*     Troponin:Troponin:   Lab Results   Component Value Date    TROPONINI 0.15 07/14/2019     LIVER PROFILE: No results for input(s): AST, ALT, LIPASE, BILIDIR, BILITOT, ALKPHOS in the last 72 hours. Invalid input(s): AMYLASE,  ALB  PT/INR:   Recent Labs     07/13/19  1326   PROTIME 11.5   INR 1.01     APTT:   Recent Labs     07/13/19  1326   APTT 31.2     BNP:  No results for input(s): BNP in the last 72 hours.   IMAGING: as noted    Assessment:  Patient Active Problem List    Diagnosis Date Noted    Essential hypertension

## 2019-07-14 NOTE — CONSULTS
And Related     Tetracyclines & Related      Extremely sick    Trazodone And Nefazodone Other (See Comments)     \"shake and could not stop\"    Voltaren [Diclofenac Sodium]     Zanaflex [Tizanidine Hcl]     Amoxicillin Rash    Penicillins Rash       REVIEW OF SYSTEMS:       History obtained from the patient    Review of Systems   Constitutional: Positive for fatigue. Negative for chills, diaphoresis and fever. HENT: Negative. Eyes: Negative. Respiratory: Negative. Cardiovascular: Positive for leg swelling. Negative for chest pain and palpitations. Gastrointestinal: Negative. Endocrine: Negative. Genitourinary: Negative. Musculoskeletal: Negative. Neurological: Negative. Psychiatric/Behavioral: Negative. PHYSICAL EXAM:       Vitals: BP (!) 143/68   Pulse 80   Temp 97.7 °F (36.5 °C) (Oral)   Resp 18   Ht 5' 3\" (1.6 m)   Wt 206 lb 2.1 oz (93.5 kg)   SpO2 98%   BMI 36.51 kg/m²     I/O:      Intake/Output Summary (Last 24 hours) at 7/14/2019 1123  Last data filed at 7/14/2019 0645  Gross per 24 hour   Intake 683 ml   Output 750 ml   Net -67 ml     No intake/output data recorded. I/O last 3 completed shifts: In: 683 [I.V.:645; Other:38]  Out: 750 [Urine:750]    Physical Examination:     Physical Exam   Constitutional: She is oriented to person, place, and time. She appears well-developed and well-nourished. HENT:   Head: Normocephalic and atraumatic. Eyes: Pupils are equal, round, and reactive to light. Conjunctivae and EOM are normal.   Neck: Normal range of motion. Neck supple. Cardiovascular: Normal rate, regular rhythm, normal heart sounds and intact distal pulses. Pulmonary/Chest: Effort normal and breath sounds normal.   Abdominal: Soft. Bowel sounds are normal.   Musculoskeletal: Normal range of motion. She exhibits edema (Bilateral LE). Neurological: She is alert and oriented to person, place, and time. Skin: Skin is warm and dry.  Capillary refill takes

## 2019-07-15 ENCOUNTER — APPOINTMENT (OUTPATIENT)
Dept: GENERAL RADIOLOGY | Age: 79
DRG: 242 | End: 2019-07-15
Payer: MEDICARE

## 2019-07-15 LAB
ALBUMIN SERPL-MCNC: 3.5 G/DL (ref 3.4–5)
ANION GAP SERPL CALCULATED.3IONS-SCNC: 13 MMOL/L (ref 3–16)
BACTERIA: ABNORMAL /HPF
BASOPHILS ABSOLUTE: 0 K/UL (ref 0–0.2)
BASOPHILS RELATIVE PERCENT: 0.2 %
BILIRUBIN URINE: NEGATIVE
BLOOD, URINE: ABNORMAL
BUN BLDV-MCNC: 66 MG/DL (ref 7–20)
CALCIUM SERPL-MCNC: 10.1 MG/DL (ref 8.3–10.6)
CHLORIDE BLD-SCNC: 102 MMOL/L (ref 99–110)
CLARITY: ABNORMAL
CO2: 22 MMOL/L (ref 21–32)
COLOR: YELLOW
CREAT SERPL-MCNC: 1.7 MG/DL (ref 0.6–1.2)
EOSINOPHILS ABSOLUTE: 0 K/UL (ref 0–0.6)
EOSINOPHILS RELATIVE PERCENT: 0.2 %
GFR AFRICAN AMERICAN: 35
GFR NON-AFRICAN AMERICAN: 29
GLUCOSE BLD-MCNC: 125 MG/DL (ref 70–99)
GLUCOSE URINE: NEGATIVE MG/DL
HCT VFR BLD CALC: 38.1 % (ref 36–48)
HEMOGLOBIN: 12.6 G/DL (ref 12–16)
KETONES, URINE: NEGATIVE MG/DL
LEUKOCYTE ESTERASE, URINE: ABNORMAL
LV EF: 58 %
LVEF MODALITY: NORMAL
LYMPHOCYTES ABSOLUTE: 1.9 K/UL (ref 1–5.1)
LYMPHOCYTES RELATIVE PERCENT: 12.4 %
MAGNESIUM: 2.5 MG/DL (ref 1.8–2.4)
MCH RBC QN AUTO: 30.7 PG (ref 26–34)
MCHC RBC AUTO-ENTMCNC: 33.1 G/DL (ref 31–36)
MCV RBC AUTO: 92.9 FL (ref 80–100)
MICROSCOPIC EXAMINATION: YES
MONOCYTES ABSOLUTE: 1.1 K/UL (ref 0–1.3)
MONOCYTES RELATIVE PERCENT: 7.1 %
NEUTROPHILS ABSOLUTE: 12.3 K/UL (ref 1.7–7.7)
NEUTROPHILS RELATIVE PERCENT: 80.1 %
NITRITE, URINE: NEGATIVE
PDW BLD-RTO: 12.6 % (ref 12.4–15.4)
PH UA: 5.5 (ref 5–8)
PHOSPHORUS: 3.1 MG/DL (ref 2.5–4.9)
PLATELET # BLD: 200 K/UL (ref 135–450)
PMV BLD AUTO: 9 FL (ref 5–10.5)
POTASSIUM SERPL-SCNC: 5.1 MMOL/L (ref 3.5–5.1)
PROTEIN UA: 100 MG/DL
RBC # BLD: 4.1 M/UL (ref 4–5.2)
RBC UA: ABNORMAL /HPF (ref 0–2)
RENAL EPITHELIAL, UA: ABNORMAL /HPF
SODIUM BLD-SCNC: 137 MMOL/L (ref 136–145)
SPECIFIC GRAVITY UA: 1.02 (ref 1–1.03)
URINE REFLEX TO CULTURE: YES
URINE TYPE: ABNORMAL
UROBILINOGEN, URINE: 0.2 E.U./DL
WBC # BLD: 15.3 K/UL (ref 4–11)
WBC UA: ABNORMAL /HPF (ref 0–5)

## 2019-07-15 PROCEDURE — 6370000000 HC RX 637 (ALT 250 FOR IP): Performed by: STUDENT IN AN ORGANIZED HEALTH CARE EDUCATION/TRAINING PROGRAM

## 2019-07-15 PROCEDURE — 83735 ASSAY OF MAGNESIUM: CPT

## 2019-07-15 PROCEDURE — 2700000000 HC OXYGEN THERAPY PER DAY

## 2019-07-15 PROCEDURE — 6360000002 HC RX W HCPCS: Performed by: STUDENT IN AN ORGANIZED HEALTH CARE EDUCATION/TRAINING PROGRAM

## 2019-07-15 PROCEDURE — 87040 BLOOD CULTURE FOR BACTERIA: CPT

## 2019-07-15 PROCEDURE — 6360000004 HC RX CONTRAST MEDICATION: Performed by: NURSE PRACTITIONER

## 2019-07-15 PROCEDURE — 85025 COMPLETE CBC W/AUTO DIFF WBC: CPT

## 2019-07-15 PROCEDURE — 2000000000 HC ICU R&B

## 2019-07-15 PROCEDURE — 36415 COLL VENOUS BLD VENIPUNCTURE: CPT

## 2019-07-15 PROCEDURE — 99233 SBSQ HOSP IP/OBS HIGH 50: CPT | Performed by: INTERNAL MEDICINE

## 2019-07-15 PROCEDURE — 2580000003 HC RX 258: Performed by: STUDENT IN AN ORGANIZED HEALTH CARE EDUCATION/TRAINING PROGRAM

## 2019-07-15 PROCEDURE — 6360000002 HC RX W HCPCS: Performed by: INTERNAL MEDICINE

## 2019-07-15 PROCEDURE — 80069 RENAL FUNCTION PANEL: CPT

## 2019-07-15 PROCEDURE — 93306 TTE W/DOPPLER COMPLETE: CPT

## 2019-07-15 PROCEDURE — 71045 X-RAY EXAM CHEST 1 VIEW: CPT

## 2019-07-15 PROCEDURE — 94761 N-INVAS EAR/PLS OXIMETRY MLT: CPT

## 2019-07-15 PROCEDURE — 81001 URINALYSIS AUTO W/SCOPE: CPT

## 2019-07-15 PROCEDURE — 6370000000 HC RX 637 (ALT 250 FOR IP): Performed by: INTERNAL MEDICINE

## 2019-07-15 PROCEDURE — 99222 1ST HOSP IP/OBS MODERATE 55: CPT | Performed by: INTERNAL MEDICINE

## 2019-07-15 RX ORDER — CHLORDIAZEPOXIDE HYDROCHLORIDE 5 MG/1
5 CAPSULE, GELATIN COATED ORAL ONCE
Status: COMPLETED | OUTPATIENT
Start: 2019-07-15 | End: 2019-07-15

## 2019-07-15 RX ADMIN — ACETAMINOPHEN 500 MG: 500 TABLET ORAL at 20:26

## 2019-07-15 RX ADMIN — Medication 10 ML: at 09:00

## 2019-07-15 RX ADMIN — CEFTRIAXONE 1 G: 1 INJECTION, POWDER, FOR SOLUTION INTRAMUSCULAR; INTRAVENOUS at 14:57

## 2019-07-15 RX ADMIN — ACETAMINOPHEN 1000 MG: 500 TABLET ORAL at 14:56

## 2019-07-15 RX ADMIN — PERFLUTREN 1.65 MG: 6.52 INJECTION, SUSPENSION INTRAVENOUS at 09:07

## 2019-07-15 RX ADMIN — TRAMADOL HYDROCHLORIDE 50 MG: 50 TABLET, FILM COATED ORAL at 20:26

## 2019-07-15 RX ADMIN — HYDRALAZINE HYDROCHLORIDE 5 MG: 20 INJECTION INTRAMUSCULAR; INTRAVENOUS at 03:24

## 2019-07-15 RX ADMIN — ACETAMINOPHEN 500 MG: 500 TABLET ORAL at 11:54

## 2019-07-15 RX ADMIN — CHLORDIAZEPOXIDE HYDROCHLORIDE 5 MG: 5 CAPSULE ORAL at 04:18

## 2019-07-15 RX ADMIN — CETIRIZINE HYDROCHLORIDE 5 MG: 10 TABLET, FILM COATED ORAL at 11:53

## 2019-07-15 RX ADMIN — HEPARIN SODIUM 5000 UNITS: 5000 INJECTION INTRAVENOUS; SUBCUTANEOUS at 22:17

## 2019-07-15 RX ADMIN — HEPARIN SODIUM 5000 UNITS: 5000 INJECTION INTRAVENOUS; SUBCUTANEOUS at 06:07

## 2019-07-15 RX ADMIN — Medication 10 ML: at 20:27

## 2019-07-15 RX ADMIN — HEPARIN SODIUM 5000 UNITS: 5000 INJECTION INTRAVENOUS; SUBCUTANEOUS at 14:57

## 2019-07-15 RX ADMIN — TRAMADOL HYDROCHLORIDE 50 MG: 50 TABLET, FILM COATED ORAL at 11:53

## 2019-07-15 ASSESSMENT — PAIN DESCRIPTION - LOCATION
LOCATION: KNEE;HIP;SHOULDER

## 2019-07-15 ASSESSMENT — ENCOUNTER SYMPTOMS
RESPIRATORY NEGATIVE: 1
GASTROINTESTINAL NEGATIVE: 1
EYES NEGATIVE: 1

## 2019-07-15 ASSESSMENT — PAIN SCALES - GENERAL
PAINLEVEL_OUTOF10: 0
PAINLEVEL_OUTOF10: 6
PAINLEVEL_OUTOF10: 0
PAINLEVEL_OUTOF10: 9
PAINLEVEL_OUTOF10: 0
PAINLEVEL_OUTOF10: 10
PAINLEVEL_OUTOF10: 9

## 2019-07-15 ASSESSMENT — PAIN DESCRIPTION - DESCRIPTORS
DESCRIPTORS: ACHING

## 2019-07-15 ASSESSMENT — PAIN DESCRIPTION - PROGRESSION
CLINICAL_PROGRESSION: GRADUALLY WORSENING
CLINICAL_PROGRESSION: GRADUALLY IMPROVING
CLINICAL_PROGRESSION: GRADUALLY WORSENING

## 2019-07-15 ASSESSMENT — PAIN DESCRIPTION - PAIN TYPE
TYPE: CHRONIC PAIN

## 2019-07-15 ASSESSMENT — PAIN DESCRIPTION - FREQUENCY
FREQUENCY: CONTINUOUS

## 2019-07-15 ASSESSMENT — PAIN DESCRIPTION - ORIENTATION
ORIENTATION: RIGHT;LEFT

## 2019-07-15 ASSESSMENT — PAIN - FUNCTIONAL ASSESSMENT
PAIN_FUNCTIONAL_ASSESSMENT: PREVENTS OR INTERFERES SOME ACTIVE ACTIVITIES AND ADLS
PAIN_FUNCTIONAL_ASSESSMENT: PREVENTS OR INTERFERES WITH MANY ACTIVE NOT PASSIVE ACTIVITIES
PAIN_FUNCTIONAL_ASSESSMENT: PREVENTS OR INTERFERES SOME ACTIVE ACTIVITIES AND ADLS

## 2019-07-15 ASSESSMENT — PAIN DESCRIPTION - ONSET
ONSET: ON-GOING

## 2019-07-15 NOTE — CONSULTS
[address]   Cardiac Electrophysiology Consultation   Date: 7/15/2019  Admit Date:  7/13/2019  Reason for Consultation: Complete heart block  Consult Requesting Physician: Seena Romberg, MD     Chief Complaint   Patient presents with    Bradycardia     Arrived via EMS with HR 23. Pt a/o x's 4     HPI: Kostas Lubin is a [de-identified] y.o. female with a past medical history significant for heart failure with preserved LV ejection fraction, CKD stage III, hypertension, obesity was admitted to the hospital secondary to fatigue, lightheadedness and loss of consciousness for the past few days. 3 weeks ago, she was seen by Dr. Donavon Murcia and got initiated on metoprolol for better management of her blood pressure. Over the weekend, she was observed for 1 day without any metoprolol on board. However she continued to have persistent and symptomatic bradycardia with hypotension. Hence, she had a temporary pacemaker from right femoral groin. For the past 3 days, she is off metoprolol. Today, she continues to be pacer dependent. I dropped her pacer back of heart rate to 30 bpm and she does not have any escape and began symptomatic with lightheadedness. Her echocardiogram from today showed preserved LV ejection fraction. , She denies to have any history of fever, shortness of breath, burning micturition.       Past Medical History:   Diagnosis Date    CHF (congestive heart failure) (HCC)     Hypertension         Past Surgical History:   Procedure Laterality Date    CHOLECYSTECTOMY      HYSTERECTOMY         Allergies   Allergen Reactions    Accupril [Quinapril Hcl] Nausea Only    Anaprox [Naproxen Sodium] Other (See Comments)     \"makes me loopy\"    Biaxin [Clarithromycin] Other (See Comments)     \"extremely sick\"    Buspar [Buspirone] Other (See Comments)     \"go to sleep all over\"    Butrans [Buprenorphine] Swelling    Capoten [Captopril]      BP goes too low    Cardene [Nicardipine] Other (See

## 2019-07-15 NOTE — PROGRESS NOTES
-- 80 22 --   07/15/19 0400 -- 98.2 °F (36.8 °C) Oral 80 28 95 %   07/15/19 0324 (!) 186/97 -- -- -- -- --   07/15/19 0300 (!) 177/97 -- -- 80 21 --   07/15/19 0200 124/78 -- -- 80 17 --   07/15/19 0100 (!) 153/80 -- -- 80 24 --       Intake/Output Summary (Last 24 hours) at 7/15/2019 0842  Last data filed at 7/15/2019 0600  Gross per 24 hour   Intake 1189 ml   Output 1350 ml   Net -161 ml       Review of Systems   Constitutional: Positive for fatigue. Negative for chills, diaphoresis and fever. HENT: Negative. Eyes: Negative. Respiratory: Negative. Cardiovascular: Positive for leg swelling. Negative for chest pain and palpitations. Gastrointestinal: Negative. Endocrine: Negative. Genitourinary: Negative. Musculoskeletal: Negative. Neurological: Negative. Psychiatric/Behavioral: Negative. Physical Exam   Constitutional: She is oriented to person, place, and time. She appears well-developed and well-nourished. HENT:   Head: Normocephalic and atraumatic. Eyes: Pupils are equal, round, and reactive to light. Conjunctivae and EOM are normal.   Neck: Normal range of motion. Neck supple. Cardiovascular: Normal rate, regular rhythm, normal heart sounds and intact distal pulses. Pulmonary/Chest: Effort normal and breath sounds normal.   Abdominal: Soft. Bowel sounds are normal.   Musculoskeletal: Normal range of motion. She exhibits no edema. Neurological: She is alert and oriented to person, place, and time. Skin: Skin is warm and dry. Capillary refill takes less than 2 seconds. Psychiatric: She has a normal mood and affect.          LABS:    CBC:   Recent Labs     07/13/19  1326 07/14/19  0400 07/15/19  0401   WBC 11.3* 12.4* 15.3*   HGB 11.6* 13.3 12.6   HCT 34.9* 39.9 38.1    124* 200   MCV 92.5 93.7 92.9     Renal:    Recent Labs     07/13/19  1326 07/14/19  0400 07/15/19  0401    137 137   K 5.2* 4.8 5.1    99 102   CO2 24 23 22   BUN 78* 85* 66*

## 2019-07-15 NOTE — PROGRESS NOTES
Result      No focal airspace consolidation. Assessment/Plan:    Active Hospital Problems    Diagnosis    Bradycardia [R00.1]    Complete heart block (HCC) [I44.2]     3rd degree AV block etiology not clear  Cardiogenic shock, resolved s/p transvenous pacer placement  - cardiology consulted, pt initially refused transvenous pacing, but decompensated overnight and had to have emergent transvenous pacing put in overnight. - stop BB  - currently still 100% pacer dependent, plan is to wait for 24 hours, if still requiring pacer, will have EP put int permanent pacemaker on Monday      PROSPER on CKD 3  Improving. Continue monitoring    UTI  On Rocephin.        NSTEMI, likely demand ischemia (type II)  - trop 0.14 - 0.15 seems to be stable  - cardiology following      Chronic CHF  - holding diuretics    Essential HTN - holding home meds    DVT Prophylaxis: heparin  Diet: DIET CARDIAC;  Code Status: Full Code    PT/OT Eval Status: none    Dispo - ICU    Shae Bermudez MD

## 2019-07-15 NOTE — PLAN OF CARE
Problem: Risk for Impaired Skin Integrity  Goal: Tissue integrity - skin and mucous membranes  Description  Structural intactness and normal physiological function of skin and  mucous membranes. 7/15/2019 0341 by Kylie Bowers RN  Outcome: Ongoing  Note:   Pt at risk for skin breakdown. See Herve score. Pt remains on bedrest. Unable to reposition self in bed. Heels elevated off bed. Sacral heart mepilex intact to protect,  site inspected and intact underneath. Will continue to turn and reposition patient every two hours and as needed. Will continue to keep patient clean and dry, applying skin care cream as needed. Pillows used for repositioning q2hs. Will continue to monitor and assess for skin breakdown. Problem: Falls - Risk of:  Goal: Will remain free from falls  Description  Will remain free from falls  7/15/2019 0341 by Kylie Bowers RN  Outcome: Ongoing  Note:   Pt is a fall risk. Fall risk protocol in place. See Derrel Bream Fall Score. Pt bed is in low position, bed alarm is on, side rails up, fall risk bracelet applied. , non-skid footwear in use. Patient/family educated on fall risk protocol, instructed to call for assistance when needed and belongings are in reach. assistance. Will continue with hourly rounds for po intake, pain needs, toileting and repositioning as needed. Will continue to monitor for needs. Problem: Cardiac Output - Decreased:  Goal: Hemodynamic stability will improve  Description  Hemodynamic stability will improve  7/15/2019 0341 by Kylie Bowers, ANIKET  Outcome: Ongoing  Note:   Temporary pacemaker in place and intact. See notes for settings, paced at 80 bpm. Cardiology will re-evaluate need for permanent pacemaker in Am. Currently NPO for possible pacemaker placement. BP elevated tonight, SBP 170s. Given Hydralazine prn. On 1L [place], O2 95%. Will continue to monitor.      Problem: Pain:  Goal: Pain level will decrease  Description  Pain level will decrease  7/15/2019 0341 by

## 2019-07-15 NOTE — PROGRESS NOTES
Measures:  · Discharge instructions:   · LVEF documented:   · ACEI for LV dysfunction:   ·    Complete heart block with the temporary transvenous pacemaker. Today she is 48 hours since her last metoprolol dose and still having complete heart block with rate of 30/min. She is not requiring pressor agents at this time. She needs a permanent pacemaker. Will contact electrophysiology in that regard. Thanks for allowing me  the opportunity to participate in the evaluation and care of your patient.  If there are questions please call me [phone]    This note was likely completed using voice recognition technology and may contain unintended grammatical, phraseology and/or punctuation  errors      Lissette Payne M.D.,[facility]  7/15/2019 9:52 AM

## 2019-07-16 ENCOUNTER — APPOINTMENT (OUTPATIENT)
Dept: GENERAL RADIOLOGY | Age: 79
DRG: 242 | End: 2019-07-16
Payer: MEDICARE

## 2019-07-16 PROBLEM — N18.9 ACUTE KIDNEY INJURY SUPERIMPOSED ON CKD (HCC): Status: ACTIVE | Noted: 2019-07-16

## 2019-07-16 PROBLEM — N39.0 UTI (URINARY TRACT INFECTION): Status: ACTIVE | Noted: 2019-07-16

## 2019-07-16 PROBLEM — N17.9 ACUTE KIDNEY INJURY SUPERIMPOSED ON CKD (HCC): Status: ACTIVE | Noted: 2019-07-16

## 2019-07-16 LAB
ALBUMIN SERPL-MCNC: 3 G/DL (ref 3.4–5)
ANION GAP SERPL CALCULATED.3IONS-SCNC: 11 MMOL/L (ref 3–16)
BASOPHILS ABSOLUTE: 0 K/UL (ref 0–0.2)
BASOPHILS RELATIVE PERCENT: 0.3 %
BUN BLDV-MCNC: 68 MG/DL (ref 7–20)
CALCIUM SERPL-MCNC: 9.5 MG/DL (ref 8.3–10.6)
CHLORIDE BLD-SCNC: 102 MMOL/L (ref 99–110)
CO2: 23 MMOL/L (ref 21–32)
CREAT SERPL-MCNC: 1.9 MG/DL (ref 0.6–1.2)
EOSINOPHILS ABSOLUTE: 0.3 K/UL (ref 0–0.6)
EOSINOPHILS RELATIVE PERCENT: 3.3 %
GFR AFRICAN AMERICAN: 31
GFR NON-AFRICAN AMERICAN: 25
GLUCOSE BLD-MCNC: 127 MG/DL (ref 70–99)
HCT VFR BLD CALC: 34.6 % (ref 36–48)
HEMOGLOBIN: 11.4 G/DL (ref 12–16)
LYMPHOCYTES ABSOLUTE: 2.2 K/UL (ref 1–5.1)
LYMPHOCYTES RELATIVE PERCENT: 24.3 %
MAGNESIUM: 2.5 MG/DL (ref 1.8–2.4)
MCH RBC QN AUTO: 30.8 PG (ref 26–34)
MCHC RBC AUTO-ENTMCNC: 33.1 G/DL (ref 31–36)
MCV RBC AUTO: 93.1 FL (ref 80–100)
MONOCYTES ABSOLUTE: 0.9 K/UL (ref 0–1.3)
MONOCYTES RELATIVE PERCENT: 10 %
NEUTROPHILS ABSOLUTE: 5.6 K/UL (ref 1.7–7.7)
NEUTROPHILS RELATIVE PERCENT: 62.1 %
PDW BLD-RTO: 13.1 % (ref 12.4–15.4)
PHOSPHORUS: 2.8 MG/DL (ref 2.5–4.9)
PLATELET # BLD: 166 K/UL (ref 135–450)
PMV BLD AUTO: 8.8 FL (ref 5–10.5)
POTASSIUM SERPL-SCNC: 4.9 MMOL/L (ref 3.5–5.1)
RBC # BLD: 3.71 M/UL (ref 4–5.2)
SODIUM BLD-SCNC: 136 MMOL/L (ref 136–145)
WBC # BLD: 9.1 K/UL (ref 4–11)

## 2019-07-16 PROCEDURE — 99233 SBSQ HOSP IP/OBS HIGH 50: CPT | Performed by: INTERNAL MEDICINE

## 2019-07-16 PROCEDURE — 71045 X-RAY EXAM CHEST 1 VIEW: CPT

## 2019-07-16 PROCEDURE — 6370000000 HC RX 637 (ALT 250 FOR IP): Performed by: INTERNAL MEDICINE

## 2019-07-16 PROCEDURE — 6360000002 HC RX W HCPCS: Performed by: INTERNAL MEDICINE

## 2019-07-16 PROCEDURE — 0JH606Z INSERTION OF PACEMAKER, DUAL CHAMBER INTO CHEST SUBCUTANEOUS TISSUE AND FASCIA, OPEN APPROACH: ICD-10-PCS | Performed by: INTERNAL MEDICINE

## 2019-07-16 PROCEDURE — 02HK3JZ INSERTION OF PACEMAKER LEAD INTO RIGHT VENTRICLE, PERCUTANEOUS APPROACH: ICD-10-PCS | Performed by: INTERNAL MEDICINE

## 2019-07-16 PROCEDURE — 2580000003 HC RX 258: Performed by: INTERNAL MEDICINE

## 2019-07-16 PROCEDURE — 80069 RENAL FUNCTION PANEL: CPT

## 2019-07-16 PROCEDURE — 2000000000 HC ICU R&B

## 2019-07-16 PROCEDURE — C1898 LEAD, PMKR, OTHER THAN TRANS: HCPCS

## 2019-07-16 PROCEDURE — 85025 COMPLETE CBC W/AUTO DIFF WBC: CPT

## 2019-07-16 PROCEDURE — 83735 ASSAY OF MAGNESIUM: CPT

## 2019-07-16 PROCEDURE — 36005 INJECTION EXT VENOGRAPHY: CPT

## 2019-07-16 PROCEDURE — C1785 PMKR, DUAL, RATE-RESP: HCPCS

## 2019-07-16 PROCEDURE — C1894 INTRO/SHEATH, NON-LASER: HCPCS

## 2019-07-16 PROCEDURE — B51N1ZZ FLUOROSCOPY OF LEFT UPPER EXTREMITY VEINS USING LOW OSMOLAR CONTRAST: ICD-10-PCS | Performed by: INTERNAL MEDICINE

## 2019-07-16 PROCEDURE — 99153 MOD SED SAME PHYS/QHP EA: CPT

## 2019-07-16 PROCEDURE — 6370000000 HC RX 637 (ALT 250 FOR IP): Performed by: STUDENT IN AN ORGANIZED HEALTH CARE EDUCATION/TRAINING PROGRAM

## 2019-07-16 PROCEDURE — 99152 MOD SED SAME PHYS/QHP 5/>YRS: CPT

## 2019-07-16 PROCEDURE — APPSS30 APP SPLIT SHARED TIME 16-30 MINUTES: Performed by: NURSE PRACTITIONER

## 2019-07-16 PROCEDURE — 33208 INSRT HEART PM ATRIAL & VENT: CPT | Performed by: INTERNAL MEDICINE

## 2019-07-16 PROCEDURE — 02H63JZ INSERTION OF PACEMAKER LEAD INTO RIGHT ATRIUM, PERCUTANEOUS APPROACH: ICD-10-PCS | Performed by: INTERNAL MEDICINE

## 2019-07-16 PROCEDURE — 2709999900 HC NON-CHARGEABLE SUPPLY

## 2019-07-16 PROCEDURE — 6360000002 HC RX W HCPCS: Performed by: STUDENT IN AN ORGANIZED HEALTH CARE EDUCATION/TRAINING PROGRAM

## 2019-07-16 PROCEDURE — 36415 COLL VENOUS BLD VENIPUNCTURE: CPT

## 2019-07-16 PROCEDURE — 99232 SBSQ HOSP IP/OBS MODERATE 35: CPT | Performed by: INTERNAL MEDICINE

## 2019-07-16 PROCEDURE — 2720000010 HC SURG SUPPLY STERILE

## 2019-07-16 PROCEDURE — 2580000003 HC RX 258: Performed by: STUDENT IN AN ORGANIZED HEALTH CARE EDUCATION/TRAINING PROGRAM

## 2019-07-16 PROCEDURE — 33208 INSRT HEART PM ATRIAL & VENT: CPT

## 2019-07-16 RX ORDER — CHLORDIAZEPOXIDE HYDROCHLORIDE 5 MG/1
5 CAPSULE, GELATIN COATED ORAL NIGHTLY PRN
Status: DISCONTINUED | OUTPATIENT
Start: 2019-07-16 | End: 2019-07-18 | Stop reason: HOSPADM

## 2019-07-16 RX ORDER — CEFAZOLIN SODIUM 2 G/50ML
2 SOLUTION INTRAVENOUS EVERY 8 HOURS
Status: DISCONTINUED | OUTPATIENT
Start: 2019-07-16 | End: 2019-07-16 | Stop reason: SDUPTHER

## 2019-07-16 RX ORDER — SODIUM CHLORIDE 0.9 % (FLUSH) 0.9 %
10 SYRINGE (ML) INJECTION EVERY 12 HOURS SCHEDULED
Status: DISCONTINUED | OUTPATIENT
Start: 2019-07-16 | End: 2019-07-16 | Stop reason: SDUPTHER

## 2019-07-16 RX ORDER — TRAMADOL HYDROCHLORIDE 50 MG/1
50 TABLET ORAL EVERY 12 HOURS PRN
Status: DISCONTINUED | OUTPATIENT
Start: 2019-07-16 | End: 2019-07-18 | Stop reason: HOSPADM

## 2019-07-16 RX ORDER — SODIUM CHLORIDE 0.9 % (FLUSH) 0.9 %
10 SYRINGE (ML) INJECTION PRN
Status: DISCONTINUED | OUTPATIENT
Start: 2019-07-16 | End: 2019-07-16 | Stop reason: SDUPTHER

## 2019-07-16 RX ORDER — TRAMADOL HYDROCHLORIDE 50 MG/1
50 TABLET ORAL EVERY 6 HOURS PRN
Status: DISCONTINUED | OUTPATIENT
Start: 2019-07-16 | End: 2019-07-16

## 2019-07-16 RX ADMIN — HEPARIN SODIUM 5000 UNITS: 5000 INJECTION INTRAVENOUS; SUBCUTANEOUS at 06:27

## 2019-07-16 RX ADMIN — ACETAMINOPHEN 500 MG: 500 TABLET ORAL at 20:10

## 2019-07-16 RX ADMIN — Medication 10 ML: at 09:42

## 2019-07-16 RX ADMIN — CEFTRIAXONE 1 G: 1 INJECTION, POWDER, FOR SOLUTION INTRAMUSCULAR; INTRAVENOUS at 18:08

## 2019-07-16 RX ADMIN — VANCOMYCIN HYDROCHLORIDE 1500 MG: 10 INJECTION, POWDER, LYOPHILIZED, FOR SOLUTION INTRAVENOUS at 11:43

## 2019-07-16 RX ADMIN — ACETAMINOPHEN 500 MG: 500 TABLET ORAL at 09:41

## 2019-07-16 RX ADMIN — CETIRIZINE HYDROCHLORIDE 5 MG: 10 TABLET, FILM COATED ORAL at 09:41

## 2019-07-16 RX ADMIN — Medication 10 ML: at 20:10

## 2019-07-16 RX ADMIN — TRAMADOL HYDROCHLORIDE 50 MG: 50 TABLET, FILM COATED ORAL at 20:09

## 2019-07-16 RX ADMIN — TRAMADOL HYDROCHLORIDE 50 MG: 50 TABLET, FILM COATED ORAL at 18:35

## 2019-07-16 RX ADMIN — HYDRALAZINE HYDROCHLORIDE 5 MG: 20 INJECTION INTRAMUSCULAR; INTRAVENOUS at 18:34

## 2019-07-16 RX ADMIN — TRAMADOL HYDROCHLORIDE 50 MG: 50 TABLET, FILM COATED ORAL at 09:42

## 2019-07-16 ASSESSMENT — PAIN - FUNCTIONAL ASSESSMENT
PAIN_FUNCTIONAL_ASSESSMENT: PREVENTS OR INTERFERES SOME ACTIVE ACTIVITIES AND ADLS
PAIN_FUNCTIONAL_ASSESSMENT: PREVENTS OR INTERFERES WITH MANY ACTIVE NOT PASSIVE ACTIVITIES

## 2019-07-16 ASSESSMENT — PAIN DESCRIPTION - ONSET
ONSET: ON-GOING
ONSET: ON-GOING

## 2019-07-16 ASSESSMENT — PAIN SCALES - GENERAL
PAINLEVEL_OUTOF10: 0
PAINLEVEL_OUTOF10: 10
PAINLEVEL_OUTOF10: 0
PAINLEVEL_OUTOF10: 8
PAINLEVEL_OUTOF10: 10

## 2019-07-16 ASSESSMENT — PAIN DESCRIPTION - ORIENTATION
ORIENTATION: RIGHT;LEFT
ORIENTATION: RIGHT;LEFT

## 2019-07-16 ASSESSMENT — PAIN DESCRIPTION - PROGRESSION
CLINICAL_PROGRESSION: NOT CHANGED
CLINICAL_PROGRESSION: GRADUALLY WORSENING

## 2019-07-16 ASSESSMENT — PAIN DESCRIPTION - DESCRIPTORS
DESCRIPTORS: ACHING
DESCRIPTORS: ACHING

## 2019-07-16 ASSESSMENT — PAIN DESCRIPTION - FREQUENCY
FREQUENCY: CONTINUOUS
FREQUENCY: CONTINUOUS

## 2019-07-16 ASSESSMENT — PAIN DESCRIPTION - PAIN TYPE
TYPE: CHRONIC PAIN
TYPE: CHRONIC PAIN

## 2019-07-16 ASSESSMENT — ENCOUNTER SYMPTOMS
EYES NEGATIVE: 1
GASTROINTESTINAL NEGATIVE: 1
RESPIRATORY NEGATIVE: 1

## 2019-07-16 ASSESSMENT — PAIN DESCRIPTION - LOCATION
LOCATION: KNEE
LOCATION: KNEE

## 2019-07-16 NOTE — PROGRESS NOTES
diastolic heart failure (HCC) [I50.32]     Priority: High    UTI (urinary tract infection) [N39.0]    Acute kidney injury superimposed on CKD ([facility] [address] [N17.9, N18.9]    Syncope and collapse [R55]    Bradycardia [R00.1]    Complete heart block (HCC) [I44.2]     3rd degree AV block etiology not clear  Cardiogenic shock, resolved s/p transvenous pacer placement  - cardiology consulted, pt initially refused transvenous pacing, but decompensated overnight and had to have emergent transvenous pacing put in overnight. Off bb  -PPM placement today    PROSPER on CKD 3  Improving. Continue monitoring    UTI  On Rocephin.        NSTEMI, likely demand ischemia (type II)  - trop 0.14 - 0.15 seems to be stable  - cardiology following      Chronic CHF  - holding diuretics    Essential HTN - holding home meds    DVT Prophylaxis: heparin  Diet: DIET CARDIAC;  Code Status: Full Code    PT/OT Eval Status: none    Dispo - ICU    Seena Romberg, MD

## 2019-07-16 NOTE — PROCEDURES
on  dopamine drip. CONCLUSION:  Successful transvenous temporary pacemaker placement at 61  cm with a hub from the right femoral venous access approach. This was  sutured into place and tied down. Settings:  Heart rate 80 mA of 3 with  a threshold to 1 milliamp and sensitivity at 3 millivolts. PLAN:  Permanent bedrest until Monday when a permanent pacemaker maybe  implanted. Dopamine is presently off and we where just gently hydrating  the patient on 100 mL an hour of saline. She appears to be much more  stabilized.         Carmen Saba MD    D: 07/14/2019 1:23:51       T: 07/14/2019 3:15:05     [email or web address]  Job#: [id number]     Doc#: [id number]    CC:  Quintin Staff MD Abbie Gonzales MD       [address]
and there were no complications. Patient was transported to the holding area in stable condition. Device and Leads information:          Impression:    Pre- and post-procedural diagnoses of complete heart block with symptoms of lightheadedness and hypotension with successful implantation of a [employer] 2-chamber PPM.     Plan:     The patient will be admitted and have usual post-implant care, including chest x-ray, intravenous antibiotic therapy, and interrogation of the device. From an EP perspective, if the interrogation and CXR tomorrow AM are showing appropriate functioning, parameters and placement, the patient may be discharged from the hospital. Follow-up will be a 1-week wound check with our nurse in the [facility] and a 1-month follow-up with Ms. Joanette Barthel. Thank you for allowing us to participate in the care of your patient. If you have any questions, please do not hesitate to contact me.     Dejan Alvarez MD   Cardiac Electrophysiology  [address]

## 2019-07-16 NOTE — PRE SEDATION
Sedation Pre-Procedure Note    Patient Name: Loney Rinne   YOB: 1940  Room/Bed: 0865/5720-63  Medical Record Number: [id number]  Date: 7/16/2019   Time: 2:53 PM       Indication:  CHB    Consent: I have discussed with the patient and/or the patient representative the indication, alternatives, and the possible risks and/or complications of the planned procedure and the anesthesia methods. The patient and/or patient representative appear to understand and agree to proceed. Vital Signs:   Vitals:    07/16/19 1200   BP: (!) 151/53   Pulse: 59   Resp: 20   Temp:    SpO2: 98%       Past Medical History:   has a past medical history of CHF (congestive heart failure) (HCC) and Hypertension. Past Surgical History:   has a past surgical history that includes Hysterectomy and Cholecystectomy. Medications:   Scheduled Meds:    cefTRIAXone (ROCEPHIN) IV  1 g Intravenous Q24H    atropine  1 mg Intravenous Once    lidocaine  1 patch Transdermal Daily    acetaminophen  1,000 mg Oral Daily    acetaminophen  500 mg Oral BID    cetirizine  5 mg Oral Daily    sodium chloride flush  10 mL Intravenous 2 times per day    heparin (porcine)  5,000 Units Subcutaneous 3 times per day    traMADol  50 mg Oral BID     Continuous Infusions:    DOPamine Stopped (07/14/19 0100)     PRN Meds: hydrALAZINE, sodium chloride flush, magnesium hydroxide, ondansetron  Home Meds:   Prior to Admission medications    Medication Sig Start Date End Date Taking? Authorizing Provider   isosorbide mononitrate (IMDUR) 30 MG extended release tablet Take 1 tablet by mouth daily 7/11/19   Rah Henderson MD   torsemide (DEMADEX) 20 MG tablet Take 1 tablet by mouth daily 7/11/19   Rah Henderson MD   traMADol (ULTRAM) 50 MG tablet Take 1 tablet by mouth 2 times daily for 120 days.  4/11/19 8/9/19  Rah Henderson MD   acetaminophen (TYLENOL) 500 MG tablet Take 500 mg by mouth    Historical Provider, MD

## 2019-07-16 NOTE — PROGRESS NOTES
clubbing. Pulses are 2+ radial/carotid/dorsalis pedis and posterior tibial bilaterally. Neurological: She is alert and oriented to person, place, and time. She has normal reflexes. No cranial nerve deficit. Coordination normal.   Skin: Skin is warm and dry. There is no rash or diaphoresis. Psychiatric: She has a normal mood and affect. Her speech is normal and behavior is normal.     Medications:    cefTRIAXone (ROCEPHIN) IV  1 g Intravenous Q24H    atropine  1 mg Intravenous Once    lidocaine  1 patch Transdermal Daily    acetaminophen  1,000 mg Oral Daily    acetaminophen  500 mg Oral BID    cetirizine  5 mg Oral Daily    sodium chloride flush  10 mL Intravenous 2 times per day    heparin (porcine)  5,000 Units Subcutaneous 3 times per day    traMADol  50 mg Oral BID      DOPamine Stopped (07/14/19 0100)     hydrALAZINE, sodium chloride flush, magnesium hydroxide, ondansetron    Lab Data:  CBC:   Recent Labs     07/14/19  0400 07/15/19  0401 07/16/19  0449   WBC 12.4* 15.3* 9.1   HGB 13.3 12.6 11.4*   * 200 166     BMP:    Recent Labs     07/14/19  0400 07/15/19  0401 07/16/19  0449    137 136   K 4.8 5.1 4.9   CO2 23 22 23   BUN 85* 66* 68*   CREATININE 2.5* 1.7* 1.9*     LIVR: No results for input(s): AST, ALT in the last 72 hours. INR:    Recent Labs     07/13/19  1326   INR 1.01     APTT:   Recent Labs     07/13/19  1326   APTT 31.2     BNP:  No results for input(s): BNP in the last 72 hours. ECHO 7/15/19   Suboptimal image quality. Definity contrast administered. Left ventricular   cavity size is normal. There is mild concentric left ventricular   hypertrophy. Overall left ventricular systolic function appears normal with   an estimated ejection fraction of 55-60% based off the parasternal images.   Cannot exclude regional wall motion abnormalities secondary to poor   endocardial visualization.  Diastolic filling parameters suggests grade II   diastolic dysfunction.   Aortic

## 2019-07-16 NOTE — PROGRESS NOTES
discussed with Dr. Sheyla Hinojosa. Patient seen, examined and discussed with the resident and I agree with the assessment and plan as edited above.       Kae Gtz MD

## 2019-07-16 NOTE — PROGRESS NOTES
Pt arrives back to ICU from cath lab post pacemaker insertion. Site to L chest is CDI. Both pt's PIVs infiltrated during procedure, therefore leaving her with R groin venous sheath as her only vascular access, OK per Dr. Pérez Iba per cath lab team to use for IV use. Pt remains supine in bed per orders. Will monitor.

## 2019-07-16 NOTE — PROGRESS NOTES
[tizanidine hcl]; Amoxicillin; and Penicillins    Current Medications:      chlordiazePOXIDE (LIBRIUM) capsule 5 mg Nightly PRN   traMADol (ULTRAM) tablet 50 mg Q12H PRN   cefTRIAXone (ROCEPHIN) 1 g IVPB in 50 mL D5W minibag Q24H   atropine injection 1 mg Once   hydrALAZINE (APRESOLINE) injection 5 mg Q4H PRN   lidocaine 4 % external patch 1 patch Daily   acetaminophen (TYLENOL) tablet 1,000 mg Daily   acetaminophen (TYLENOL) tablet 500 mg BID   cetirizine (ZYRTEC) tablet 5 mg Daily   sodium chloride flush 0.9 % injection 10 mL 2 times per day   sodium chloride flush 0.9 % injection 10 mL PRN   magnesium hydroxide (MILK OF MAGNESIA) 400 MG/5ML suspension 30 mL Daily PRN   ondansetron (ZOFRAN) injection 4 mg Q6H PRN   traMADol (ULTRAM) tablet 50 mg BID           Physical exam:     Vitals:  BP (!) 131/98   Pulse 82   Temp 97.5 °F (36.4 °C) (Oral)   Resp 19   Ht 5' 3\" (1.6 m)   Wt 209 lb 7 oz (95 kg)   SpO2 99%   BMI 37.10 kg/m²   Constitutional:  OAA X3 NAD  Skin: no rash, turgor wnl  Heent:  eomi, mmm  Neck: no bruits or jvd noted  Cardiovascular:  S1, S2 heard   Respiratory: CTA B without w/r/r  Abdomen:  +bs, soft, nt, nd  Ext: + lower extremity edema  Psychiatric: mood and affect appropriate  Musculoskeletal:  Rom, muscular strength intact    Data:   Labs:  CBC:   Recent Labs     07/15/19  0401 07/16/19 0449 07/17/19 0452   WBC 15.3* 9.1 8.7   HGB 12.6 11.4* 10.6*    166 170     BMP:    Recent Labs     07/15/19  0401 07/16/19 0449 07/17/19 0452    136 135*   K 5.1 4.9 5.1    102 102   CO2 22 23 24   BUN 66* 68* 58*   CREATININE 1.7* 1.9* 1.7*   GLUCOSE 125* 127* 121*     Ca/Mg/Phos:   Recent Labs     07/15/19  0401 07/16/19 0449 07/17/19  0452   CALCIUM 10.1 9.5 9.7   MG 2.50* 2.50* 2.40   PHOS 3.1 2.8 3.1     Hepatic: No results for input(s): AST, ALT, ALB, BILITOT, ALKPHOS in the last 72 hours. Troponin:   No results for input(s): TROPONINI in the last 72 hours.   BNP: No results

## 2019-07-16 NOTE — PROGRESS NOTES
Electrophysiology - PROGRESS NOTE    Admit Date: 7/13/2019     Chief Complaint: CHB     Interval History:   Patient seen and examined and notes reviewed. Patient is being followed for CHB. Temp PPM in place. Patient uncomfortable with having to lie flat with temp PPM in . Would like PPM today. No CP or SOB.      In: 480 [P.O.:480]  Out: 350    Wt Readings from Last 2 Encounters:   07/16/19 210 lb 8.6 oz (95.5 kg)   07/11/19 199 lb (90.3 kg)         Data:   Scheduled Meds:   Scheduled Meds:   cefTRIAXone (ROCEPHIN) IV  1 g Intravenous Q24H    atropine  1 mg Intravenous Once    lidocaine  1 patch Transdermal Daily    acetaminophen  1,000 mg Oral Daily    acetaminophen  500 mg Oral BID    cetirizine  5 mg Oral Daily    sodium chloride flush  10 mL Intravenous 2 times per day    heparin (porcine)  5,000 Units Subcutaneous 3 times per day    traMADol  50 mg Oral BID     Continuous Infusions:   DOPamine Stopped (07/14/19 0100)     PRN Meds:.hydrALAZINE, sodium chloride flush, magnesium hydroxide, ondansetron  Continuous Infusions:   DOPamine Stopped (07/14/19 0100)       Intake/Output Summary (Last 24 hours) at 7/16/2019 0924  Last data filed at 7/16/2019 0531  Gross per 24 hour   Intake 970 ml   Output 590 ml   Net 380 ml       CBC:   Lab Results   Component Value Date    WBC 9.1 07/16/2019    HGB 11.4 07/16/2019     07/16/2019     BMP:  Lab Results   Component Value Date     07/16/2019    K 4.9 07/16/2019    K 5.2 07/13/2019     07/16/2019    CO2 23 07/16/2019    BUN 68 07/16/2019    CREATININE 1.9 07/16/2019    GLUCOSE 127 07/16/2019     INR:   Lab Results   Component Value Date    INR 1.01 07/13/2019        CARDIAC LABS  ENZYMES:  Recent Labs     07/13/19  1800 07/14/19  0022 07/14/19  0400   TROPONINI 0.14* 0.16* 0.15*     FASTING LIPID PANEL:No results found for: HDL, LDLDIRECT, LDLCALC, TRIG, TSH  LIVER PROFILE:  Lab Results Component Value Date    AST 21 10/02/2017    ALT 13 10/02/2017       -----------------------------------------------------------------  Telemetry: Personally reviewed      Objective:   Vitals: BP (!) 146/107   Pulse 59   Temp 98.4 °F (36.9 °C) (Oral)   Resp 18   Ht 5' 3\" (1.6 m)   Wt 210 lb 8.6 oz (95.5 kg)   SpO2 100%   BMI 37.30 kg/m²   General appearance: alert, appears stated age and cooperative, No acute distress   Skin: Skin color, texture, turgor normal. No rashes or ecchymosis. Neck: no JVD, supple, symmetrical, trachea midline   Lungs: , no accessory muscle use, no respiratory distress  Heart: RRR  Abdomen: soft, non-tender; bowel sounds normal  Extremities: bilat LE edema, DP +  Psychiatric: normal insight and affect    Patient Active Problem List:     Chronic diastolic heart failure (HCC)     Bilateral lower extremity edema     Essential hypertension     Chronic diastolic congestive heart failure (HCC)     Complete heart block (HCC)     Bradycardia     Syncope and collapse     UTI (urinary tract infection)     Acute kidney injury superimposed on CKD ([address]        Assessment & Plan:      1. High degree AV block  2. Temp PPM  3. Chronic dCHF  4. UTI    [de-identified] y/o woman with a h/o HTN, CKD stage III, obesity, hronic dCHF, who p/w fatigue , lightheadedness and LOC, found to have bradycardia and type II AV block, temp PPM placed, BB held and AVB/bradycardia persisted. Planned for PPM however WBC elevated, today 9.1.      High degree AV block  - Temp PPM in place  - WBC 9.1   - Will discuss PPM placement with Dr. Houston Jimenez  - Currently NPO  - Hibiclens scrub    UTI  - On ramon      Guillerminavinny [address]

## 2019-07-17 ENCOUNTER — APPOINTMENT (OUTPATIENT)
Dept: GENERAL RADIOLOGY | Age: 79
DRG: 242 | End: 2019-07-17
Payer: MEDICARE

## 2019-07-17 ENCOUNTER — PATIENT MESSAGE (OUTPATIENT)
Dept: CARDIOLOGY CLINIC | Age: 79
End: 2019-07-17

## 2019-07-17 LAB
ALBUMIN SERPL-MCNC: 3 G/DL (ref 3.4–5)
ANION GAP SERPL CALCULATED.3IONS-SCNC: 9 MMOL/L (ref 3–16)
BASOPHILS ABSOLUTE: 0.1 K/UL (ref 0–0.2)
BASOPHILS RELATIVE PERCENT: 1 %
BUN BLDV-MCNC: 58 MG/DL (ref 7–20)
CALCIUM SERPL-MCNC: 9.7 MG/DL (ref 8.3–10.6)
CHLORIDE BLD-SCNC: 102 MMOL/L (ref 99–110)
CO2: 24 MMOL/L (ref 21–32)
CREAT SERPL-MCNC: 1.7 MG/DL (ref 0.6–1.2)
EOSINOPHILS ABSOLUTE: 0.5 K/UL (ref 0–0.6)
EOSINOPHILS RELATIVE PERCENT: 5.3 %
GFR AFRICAN AMERICAN: 35
GFR NON-AFRICAN AMERICAN: 29
GLUCOSE BLD-MCNC: 121 MG/DL (ref 70–99)
HCT VFR BLD CALC: 31.3 % (ref 36–48)
HEMOGLOBIN: 10.6 G/DL (ref 12–16)
LYMPHOCYTES ABSOLUTE: 2.1 K/UL (ref 1–5.1)
LYMPHOCYTES RELATIVE PERCENT: 24.2 %
MAGNESIUM: 2.4 MG/DL (ref 1.8–2.4)
MCH RBC QN AUTO: 31.1 PG (ref 26–34)
MCHC RBC AUTO-ENTMCNC: 33.7 G/DL (ref 31–36)
MCV RBC AUTO: 92.2 FL (ref 80–100)
MONOCYTES ABSOLUTE: 0.8 K/UL (ref 0–1.3)
MONOCYTES RELATIVE PERCENT: 9.3 %
NEUTROPHILS ABSOLUTE: 5.3 K/UL (ref 1.7–7.7)
NEUTROPHILS RELATIVE PERCENT: 60.2 %
PDW BLD-RTO: 13 % (ref 12.4–15.4)
PHOSPHORUS: 3.1 MG/DL (ref 2.5–4.9)
PLATELET # BLD: 170 K/UL (ref 135–450)
PMV BLD AUTO: 8.5 FL (ref 5–10.5)
POTASSIUM SERPL-SCNC: 5.1 MMOL/L (ref 3.5–5.1)
RBC # BLD: 3.4 M/UL (ref 4–5.2)
SODIUM BLD-SCNC: 135 MMOL/L (ref 136–145)
WBC # BLD: 8.7 K/UL (ref 4–11)

## 2019-07-17 PROCEDURE — 6370000000 HC RX 637 (ALT 250 FOR IP): Performed by: INTERNAL MEDICINE

## 2019-07-17 PROCEDURE — 99232 SBSQ HOSP IP/OBS MODERATE 35: CPT | Performed by: INTERNAL MEDICINE

## 2019-07-17 PROCEDURE — 2580000003 HC RX 258: Performed by: STUDENT IN AN ORGANIZED HEALTH CARE EDUCATION/TRAINING PROGRAM

## 2019-07-17 PROCEDURE — 83735 ASSAY OF MAGNESIUM: CPT

## 2019-07-17 PROCEDURE — 99233 SBSQ HOSP IP/OBS HIGH 50: CPT | Performed by: INTERNAL MEDICINE

## 2019-07-17 PROCEDURE — 1200000000 HC SEMI PRIVATE

## 2019-07-17 PROCEDURE — 2580000003 HC RX 258: Performed by: INTERNAL MEDICINE

## 2019-07-17 PROCEDURE — 97161 PT EVAL LOW COMPLEX 20 MIN: CPT

## 2019-07-17 PROCEDURE — 6370000000 HC RX 637 (ALT 250 FOR IP): Performed by: STUDENT IN AN ORGANIZED HEALTH CARE EDUCATION/TRAINING PROGRAM

## 2019-07-17 PROCEDURE — 85025 COMPLETE CBC W/AUTO DIFF WBC: CPT

## 2019-07-17 PROCEDURE — 97530 THERAPEUTIC ACTIVITIES: CPT

## 2019-07-17 PROCEDURE — 99233 SBSQ HOSP IP/OBS HIGH 50: CPT | Performed by: NURSE PRACTITIONER

## 2019-07-17 PROCEDURE — 97166 OT EVAL MOD COMPLEX 45 MIN: CPT

## 2019-07-17 PROCEDURE — 80069 RENAL FUNCTION PANEL: CPT

## 2019-07-17 PROCEDURE — 71045 X-RAY EXAM CHEST 1 VIEW: CPT

## 2019-07-17 PROCEDURE — 36415 COLL VENOUS BLD VENIPUNCTURE: CPT

## 2019-07-17 RX ORDER — TORSEMIDE 20 MG/1
40 TABLET ORAL DAILY
Status: DISCONTINUED | OUTPATIENT
Start: 2019-07-17 | End: 2019-07-18

## 2019-07-17 RX ORDER — CHLORDIAZEPOXIDE HYDROCHLORIDE 5 MG/1
5 CAPSULE, GELATIN COATED ORAL ONCE
Status: COMPLETED | OUTPATIENT
Start: 2019-07-17 | End: 2019-07-17

## 2019-07-17 RX ADMIN — CETIRIZINE HYDROCHLORIDE 5 MG: 10 TABLET, FILM COATED ORAL at 09:15

## 2019-07-17 RX ADMIN — TORSEMIDE 40 MG: 20 TABLET ORAL at 10:12

## 2019-07-17 RX ADMIN — Medication 10 ML: at 09:17

## 2019-07-17 RX ADMIN — Medication 10 ML: at 20:35

## 2019-07-17 RX ADMIN — ACETAMINOPHEN 500 MG: 500 TABLET ORAL at 20:35

## 2019-07-17 RX ADMIN — ACETAMINOPHEN 1000 MG: 500 TABLET ORAL at 12:07

## 2019-07-17 RX ADMIN — TRAMADOL HYDROCHLORIDE 50 MG: 50 TABLET, FILM COATED ORAL at 18:47

## 2019-07-17 RX ADMIN — TRAMADOL HYDROCHLORIDE 50 MG: 50 TABLET, FILM COATED ORAL at 09:16

## 2019-07-17 RX ADMIN — CHLORDIAZEPOXIDE HYDROCHLORIDE 5 MG: 5 CAPSULE ORAL at 20:35

## 2019-07-17 RX ADMIN — ACETAMINOPHEN 500 MG: 500 TABLET ORAL at 09:15

## 2019-07-17 RX ADMIN — TRAMADOL HYDROCHLORIDE 50 MG: 50 TABLET, FILM COATED ORAL at 20:35

## 2019-07-17 RX ADMIN — CHLORDIAZEPOXIDE HYDROCHLORIDE 5 MG: 5 CAPSULE ORAL at 00:26

## 2019-07-17 ASSESSMENT — PAIN SCALES - GENERAL
PAINLEVEL_OUTOF10: 5
PAINLEVEL_OUTOF10: 0
PAINLEVEL_OUTOF10: 0
PAINLEVEL_OUTOF10: 6
PAINLEVEL_OUTOF10: 8

## 2019-07-17 ASSESSMENT — PAIN DESCRIPTION - LOCATION
LOCATION: KNEE
LOCATION: GENERALIZED
LOCATION: GENERALIZED

## 2019-07-17 ASSESSMENT — PAIN DESCRIPTION - ORIENTATION
ORIENTATION: RIGHT;LEFT
ORIENTATION: LEFT;RIGHT

## 2019-07-17 ASSESSMENT — PAIN DESCRIPTION - ONSET
ONSET: ON-GOING

## 2019-07-17 ASSESSMENT — PAIN DESCRIPTION - DESCRIPTORS
DESCRIPTORS: ACHING

## 2019-07-17 ASSESSMENT — PAIN DESCRIPTION - PROGRESSION
CLINICAL_PROGRESSION: GRADUALLY WORSENING

## 2019-07-17 ASSESSMENT — PAIN - FUNCTIONAL ASSESSMENT
PAIN_FUNCTIONAL_ASSESSMENT: PREVENTS OR INTERFERES SOME ACTIVE ACTIVITIES AND ADLS
PAIN_FUNCTIONAL_ASSESSMENT: PREVENTS OR INTERFERES SOME ACTIVE ACTIVITIES AND ADLS

## 2019-07-17 ASSESSMENT — PAIN DESCRIPTION - PAIN TYPE
TYPE: CHRONIC PAIN

## 2019-07-17 ASSESSMENT — PAIN DESCRIPTION - FREQUENCY
FREQUENCY: CONTINUOUS

## 2019-07-17 NOTE — PROGRESS NOTES
Pulmonary & Critical Care Medicine ICU Progress Note    Admit Date: 2019  PCP: Varun Putnam DO    CC:  Complete heart block  Events of Last 24 hours: Had her permanent pacer placed yesterday. This morning complaining that she didn't sleep very well and she's been shaky since the procedure. Vitals:  Tmax:  VITALS:  /83   Pulse 82   Temp 98 °F (36.7 °C) (Axillary)   Resp 22   Ht 5' 3\" (1.6 m)   Wt 209 lb 7 oz (95 kg)   SpO2 97%   BMI 37.10 kg/m²   24HR INTAKE/OUTPUT:      Intake/Output Summary (Last 24 hours) at 2019 1727  Last data filed at 2019 1400  Gross per 24 hour   Intake 887.63 ml   Output 2050 ml   Net -1162.37 ml     CURRENT PULSE OXIMETRY:  SpO2: 97 %  24HR PULSE OXIMETRY RANGE:  SpO2  Av.9 %  Min: 93 %  Max: 100 %  CVP:      Invasive Lines: none  EXAM:  General: No distress. Alert. Eyes: PERRL. No sclera icterus. No conjunctival injection. ENT: No discharge. Pharynx clear. Neck: Trachea midline. Normal thyroid. Resp: No accessory muscle use. No crackles. No wheezing. No rhonchi. CV: Regular rate. Regular rhythm. No mumur or rub. 2+ edema. GI: Non-tender. Non-distended. No masses. No organmegaly. Normal bowel sounds. Skin: Warm and dry. No nodule on exposed extremities. No rash on exposed extremities. Lymph: No cervical LAD. No supraclavicular LAD. M/S: No cyanosis. No joint deformity. No clubbing. Neuro: Awake. Will follow commands. Positive pupils/gag/corneals. Normal pain response. Psych: Oriented to person, place, time. No anxiety or agitation.      Medications:    IV:        Scheduled Meds:   torsemide  40 mg Oral Daily    cefTRIAXone (ROCEPHIN) IV  1 g Intravenous Q24H    atropine  1 mg Intravenous Once    lidocaine  1 patch Transdermal Daily    acetaminophen  1,000 mg Oral Daily    acetaminophen  500 mg Oral BID    cetirizine  5 mg Oral Daily    sodium chloride flush  10 mL Intravenous 2 times per day    traMADol  50 mg Oral

## 2019-07-17 NOTE — PROGRESS NOTES
4 Eyes Admission Assessment     I agree as the admission nurse that 2 RN's have performed a thorough Head to Toe Skin Assessment on the patient. ALL assessment sites listed below have been assessed on admission. Areas assessed by both nurses:   [x]   Head, Face, and Ears   [x]   Shoulders, Back, and Chest  [x]   Arms, Elbows, and Hands   [x]   Coccyx, Sacrum, and Ischum -- excoriation fernanda-area, DTI Coccyx  [x]   Legs, Feet, and Heels        Does the Patient have Skin Breakdown?   No         Herve Prevention initiated:  No   Wound Care Orders initiated:  No      WOC nurse consulted for Pressure Injury (Stage 3,4, Unstageable, DTI, NWPT, and Complex wounds):  No      Nurse 1 eSignature: Electronically signed by Herminia Olivares RN on 7/17/19 at 6:40 PM    **SHARE this note so that the co-signing nurse is able to place an eSignature**    Nurse 2 eSignature: {Esignature:[id number]}

## 2019-07-17 NOTE — PROGRESS NOTES
Pt arrived to room 4461. Patient in bed with bed in lowest position, call light and belongings within reach. Patient education folder given and reviewed. Safety protocols and unit activities (VS, meds, rounding, etc.) explained to patient/family. No further questions or needs stated at this time. Instructed to call with any needs.   Electronically signed by Jose Soria RN on 7/17/2019 at 6:40 PM

## 2019-07-17 NOTE — PROGRESS NOTES
Resident Transfer Acceptance Note    Admit Date: 2019    PCP: Den Hernández DO                  : 1940  MRN: [id number]    Subjective: Interval History:     Jesusita Lai is a 79 y/o female with PMHx diastolic CHF (diastolic), CKD 3, and HTN presented to [facility] with 2 day history of fatigue and lightheadedness. Upon arrival to the ED, patient's heart rate was in the 20s. Cardiology evaluated her, found her to have complete heart block, and placed a transvenous pacemaker. Patient had permanent pacemaker placed on . Patient is now stable and able to be discharged home. No acute events overnight. Pt leukocytosis 2/2 UTI has resolved on ceftriaxone. Pt has no other complaints otherwise. Permanent pacemaker placed yesterday . Pt denies any fevers, chills, chest pain, palpitations, leg swelling, cough, shortness of breath, difficulty breathing, wheezing, abdominal pain, nausea, vomiting, diarrhea. Diet: DIET CARDIAC;  Pain is:Mild  Nausea:None  Bowel Movement/Flatus yes    Data:   Scheduled Meds:   torsemide  40 mg Oral Daily    cefTRIAXone (ROCEPHIN) IV  1 g Intravenous Q24H    atropine  1 mg Intravenous Once    lidocaine  1 patch Transdermal Daily    acetaminophen  1,000 mg Oral Daily    acetaminophen  500 mg Oral BID    cetirizine  5 mg Oral Daily    sodium chloride flush  10 mL Intravenous 2 times per day    traMADol  50 mg Oral BID     Continuous Infusions:  PRN Meds:chlordiazePOXIDE, traMADol, hydrALAZINE, sodium chloride flush, magnesium hydroxide, ondansetron  I/O last 3 completed shifts: In: 887.6 [P.O.:840; IV Piggyback:47.6]  Out: 1050 [Urine:1050]  No intake/output data recorded.     Intake/Output Summary (Last 24 hours) at 2019 1515  Last data filed at 2019 1300  Gross per 24 hour   Intake 887.63 ml   Output 1050 ml   Net -162.37 ml           Objective:     Vitals: BP (!) 160/56   Pulse 80   Temp 98 °F (36.7 °C) (Axillary)   Resp 18   Ht 5' 3\" Olivia Rendon MD  Internal Medicine, PGY-1  Pager: [phone]  7/17/2019  3:15 PM    This patient has been staffed and discussed with attending Sameer Abreu MD.

## 2019-07-17 NOTE — PROGRESS NOTES
light within reach; Left in chair           Patient Diagnosis(es): The encounter diagnosis was Complete heart block ([address] has a past medical history of CHF (congestive heart failure) ([address] and Hypertension. has a past surgical history that includes Hysterectomy and Cholecystectomy. Treatment Diagnosis: Limited ADLs and functional mobility 2/2 pacemaker placement      Restrictions  Position Activity Restriction  Other position/activity restrictions: up with assistance, pacemaker precautions    Subjective   General  Chart Reviewed: Yes  Patient assessed for rehabilitation services?: Yes  Additional Pertinent Hx: Admitted 7/13 with bradycardia, fatigue and lightheadedness x 2 days         Imaging: chest XR- multiple findings; L subclavian dual lead pacemaker; no visable pneumothorax         PMH: CHF, HTN  Family / Caregiver Present: Yes()  Diagnosis: bradycardia  Subjective  Subjective: Pt in bed and agreeable to OT eval and tx / OOB activity with maximal encouragement. Patient Currently in Pain: Yes    Social/Functional History  Social/Functional History  Lives With: Spouse  Type of Home: House  Home Layout: Laundry in basement, Able to Live on Main level with bedroom/bathroom(3 levels; only use basement and main level; chair lift to basement; garage attached to basement)  Home Access: Level entry  Bathroom Shower/Tub: Tub/Shower unit  Bathroom Toilet: Handicap height(uses walker for leverage)  Bathroom Equipment: Commode  Home Equipment: Roll About, [employer], Cane(stair lift)  ADL Assistance: Needs assistance( helps with LE dressing, bathing, toileting)  Homemaking Assistance: (family completes)  Homemaking Responsibilities: No  Ambulation Assistance: Independent(uses 4 wheeled walker at home; wheelchair in community)  Transfer Assistance: Needs assistance  Active : No  Additional Comments: Pt denies recent falls.  provides 24-hr assistance.        Objective  Treatment

## 2019-07-18 VITALS
WEIGHT: 210.54 LBS | RESPIRATION RATE: 20 BRPM | TEMPERATURE: 98.1 F | SYSTOLIC BLOOD PRESSURE: 137 MMHG | OXYGEN SATURATION: 94 % | HEART RATE: 82 BPM | BODY MASS INDEX: 37.3 KG/M2 | DIASTOLIC BLOOD PRESSURE: 60 MMHG | HEIGHT: 63 IN

## 2019-07-18 LAB
ALBUMIN SERPL-MCNC: 3 G/DL (ref 3.4–5)
ANION GAP SERPL CALCULATED.3IONS-SCNC: 13 MMOL/L (ref 3–16)
BASOPHILS ABSOLUTE: 0 K/UL (ref 0–0.2)
BASOPHILS RELATIVE PERCENT: 0.6 %
BUN BLDV-MCNC: 61 MG/DL (ref 7–20)
CALCIUM SERPL-MCNC: 9.5 MG/DL (ref 8.3–10.6)
CHLORIDE BLD-SCNC: 98 MMOL/L (ref 99–110)
CO2: 23 MMOL/L (ref 21–32)
CREAT SERPL-MCNC: 1.9 MG/DL (ref 0.6–1.2)
EOSINOPHILS ABSOLUTE: 0.6 K/UL (ref 0–0.6)
EOSINOPHILS RELATIVE PERCENT: 6.2 %
GFR AFRICAN AMERICAN: 31
GFR NON-AFRICAN AMERICAN: 25
GLUCOSE BLD-MCNC: 121 MG/DL (ref 70–99)
HCT VFR BLD CALC: 32.5 % (ref 36–48)
HEMOGLOBIN: 11 G/DL (ref 12–16)
LYMPHOCYTES ABSOLUTE: 2.3 K/UL (ref 1–5.1)
LYMPHOCYTES RELATIVE PERCENT: 25.9 %
MAGNESIUM: 2 MG/DL (ref 1.8–2.4)
MCH RBC QN AUTO: 31.4 PG (ref 26–34)
MCHC RBC AUTO-ENTMCNC: 33.8 G/DL (ref 31–36)
MCV RBC AUTO: 93 FL (ref 80–100)
MONOCYTES ABSOLUTE: 1.1 K/UL (ref 0–1.3)
MONOCYTES RELATIVE PERCENT: 11.8 %
NEUTROPHILS ABSOLUTE: 5 K/UL (ref 1.7–7.7)
NEUTROPHILS RELATIVE PERCENT: 55.5 %
PDW BLD-RTO: 12.9 % (ref 12.4–15.4)
PHOSPHORUS: 3.5 MG/DL (ref 2.5–4.9)
PLATELET # BLD: 175 K/UL (ref 135–450)
PMV BLD AUTO: 8.4 FL (ref 5–10.5)
POTASSIUM SERPL-SCNC: 4.6 MMOL/L (ref 3.5–5.1)
RBC # BLD: 3.49 M/UL (ref 4–5.2)
SODIUM BLD-SCNC: 134 MMOL/L (ref 136–145)
WBC # BLD: 9 K/UL (ref 4–11)

## 2019-07-18 PROCEDURE — 6370000000 HC RX 637 (ALT 250 FOR IP): Performed by: STUDENT IN AN ORGANIZED HEALTH CARE EDUCATION/TRAINING PROGRAM

## 2019-07-18 PROCEDURE — 99233 SBSQ HOSP IP/OBS HIGH 50: CPT | Performed by: INTERNAL MEDICINE

## 2019-07-18 PROCEDURE — 36415 COLL VENOUS BLD VENIPUNCTURE: CPT

## 2019-07-18 PROCEDURE — 83735 ASSAY OF MAGNESIUM: CPT

## 2019-07-18 PROCEDURE — 6370000000 HC RX 637 (ALT 250 FOR IP): Performed by: INTERNAL MEDICINE

## 2019-07-18 PROCEDURE — 80069 RENAL FUNCTION PANEL: CPT

## 2019-07-18 PROCEDURE — 97530 THERAPEUTIC ACTIVITIES: CPT

## 2019-07-18 PROCEDURE — 85025 COMPLETE CBC W/AUTO DIFF WBC: CPT

## 2019-07-18 PROCEDURE — 94761 N-INVAS EAR/PLS OXIMETRY MLT: CPT

## 2019-07-18 PROCEDURE — 99233 SBSQ HOSP IP/OBS HIGH 50: CPT | Performed by: NURSE PRACTITIONER

## 2019-07-18 RX ORDER — ISOSORBIDE MONONITRATE 30 MG/1
30 TABLET, EXTENDED RELEASE ORAL DAILY
Status: DISCONTINUED | OUTPATIENT
Start: 2019-07-18 | End: 2019-07-18 | Stop reason: HOSPADM

## 2019-07-18 RX ORDER — TORSEMIDE 20 MG/1
20 TABLET ORAL DAILY
Status: DISCONTINUED | OUTPATIENT
Start: 2019-07-19 | End: 2019-07-18 | Stop reason: HOSPADM

## 2019-07-18 RX ORDER — TORSEMIDE 20 MG/1
40 TABLET ORAL DAILY
Qty: 30 TABLET | Refills: 3 | Status: CANCELLED | OUTPATIENT
Start: 2019-07-18

## 2019-07-18 RX ADMIN — TRAMADOL HYDROCHLORIDE 50 MG: 50 TABLET, FILM COATED ORAL at 06:37

## 2019-07-18 RX ADMIN — TORSEMIDE 40 MG: 20 TABLET ORAL at 09:01

## 2019-07-18 RX ADMIN — SALINE NASAL SPRAY 1 SPRAY: 1.5 SOLUTION NASAL at 11:04

## 2019-07-18 RX ADMIN — CETIRIZINE HYDROCHLORIDE 5 MG: 10 TABLET, FILM COATED ORAL at 09:01

## 2019-07-18 RX ADMIN — ACETAMINOPHEN 1000 MG: 500 TABLET ORAL at 11:04

## 2019-07-18 RX ADMIN — TRAMADOL HYDROCHLORIDE 50 MG: 50 TABLET, FILM COATED ORAL at 09:00

## 2019-07-18 RX ADMIN — ISOSORBIDE MONONITRATE 30 MG: 30 TABLET, EXTENDED RELEASE ORAL at 09:01

## 2019-07-18 RX ADMIN — ACETAMINOPHEN 500 MG: 500 TABLET ORAL at 09:00

## 2019-07-18 ASSESSMENT — PAIN SCALES - GENERAL
PAINLEVEL_OUTOF10: 0
PAINLEVEL_OUTOF10: 0
PAINLEVEL_OUTOF10: 6
PAINLEVEL_OUTOF10: 5
PAINLEVEL_OUTOF10: 5

## 2019-07-18 ASSESSMENT — PAIN DESCRIPTION - FREQUENCY: FREQUENCY: CONTINUOUS

## 2019-07-18 ASSESSMENT — PAIN DESCRIPTION - LOCATION: LOCATION: GENERALIZED

## 2019-07-18 ASSESSMENT — PAIN DESCRIPTION - PROGRESSION: CLINICAL_PROGRESSION: GRADUALLY WORSENING

## 2019-07-18 ASSESSMENT — PAIN DESCRIPTION - ORIENTATION: ORIENTATION: RIGHT;LEFT

## 2019-07-18 ASSESSMENT — PAIN DESCRIPTION - ONSET: ONSET: ON-GOING

## 2019-07-18 ASSESSMENT — PAIN DESCRIPTION - PAIN TYPE: TYPE: CHRONIC PAIN

## 2019-07-18 ASSESSMENT — PAIN - FUNCTIONAL ASSESSMENT: PAIN_FUNCTIONAL_ASSESSMENT: PREVENTS OR INTERFERES SOME ACTIVE ACTIVITIES AND ADLS

## 2019-07-18 ASSESSMENT — PAIN DESCRIPTION - DESCRIPTORS: DESCRIPTORS: ACHING

## 2019-07-18 NOTE — PROGRESS NOTES
Physical Therapy    Facility/Department: [place] [address] PCU  Initial Assessment and Treatment    NAME: Jovanni Guerrero  : 1940  MRN: [id number]    Date of Service: 2019    Discharge Recommendations:Allegra Grimes scored a 15/24 on the AM-PAC short mobility form. Current research shows that an AM-PAC score of 17 or less is typically not associated with a discharge to the patient's home setting. Based on the patients AM-PAC score and their current functional mobility deficits, it is recommended that the patient have 3-5 sessions per week of Physical Therapy at d/c to increase the patients independence. PT Equipment Recommendations  Equipment Needed: No    Assessment   Body structures, Functions, Activity limitations: Decreased functional mobility ; Decreased endurance;Decreased balance  Assessment: Decreased assist for transfers and gt this date. Pt below baseline function. Plans to return home and  able to provide 24 hr assist.  Gait belt issued and  instructed on use. Pt/ report no concerns about returning home and decline any home therapy. \"We know what we need to do. \"    Treatment Diagnosis: impaired functional mobility. Patient Education: role of PT, bed mobility, transfers, pacemaker precautions. Pt verbalized understanding  REQUIRES PT FOLLOW UP: Yes       Patient Diagnosis(es): The encounter diagnosis was Complete heart block ([address] has a past medical history of CHF (congestive heart failure) ([address] and Hypertension. has a past surgical history that includes Hysterectomy and Cholecystectomy. Restrictions  Position Activity Restriction  Other position/activity restrictions: up with assistance, pacemaker precautions  Vision/Hearing        Subjective  General  Chart Reviewed: Yes  Additional Pertinent Hx: PMH: HTN, CHF. Pt admitted with lightheadedness & fatigue, HR in 20's. Found to have complete heart block. Pacemaker placed .   Family / Caregiver

## 2019-07-18 NOTE — PROGRESS NOTES
Nephrology Consult Note                                                                                                                                                                                                                                                                                                                                                               Office : [phone]     Fax :[phone]              Patient's Name: [place] Harness    Cr stable   Good UO   Permanent pacemaker placed    Wt Readings from Last 3 Encounters:   07/18/19 210 lb 8.6 oz (95.5 kg)   07/11/19 199 lb (90.3 kg)   04/11/19 185 lb (83.9 kg)       Past Medical History:   Diagnosis Date    CHF (congestive heart failure) (HCC)     Hypertension        Past Surgical History:   Procedure Laterality Date    CHOLECYSTECTOMY      HYSTERECTOMY         History reviewed. No pertinent family history. reports that she has never smoked. She has never used smokeless tobacco. She reports that she does not drink alcohol or use drugs. Allergies:  Accupril [quinapril hcl]; Anaprox [naproxen sodium]; Biaxin [clarithromycin]; Buspar [buspirone]; Butrans [buprenorphine]; Capoten [captopril]; Cardene [nicardipine]; Cardizem [diltiazem hcl]; Ciprofloxacin; Codeine; Cymbalta [duloxetine hcl]; Demerol hcl [meperidine]; Doxycycline; Elavil [amitriptyline hcl]; Entex lq [phenylephrine-guaifenesin]; Feldene [piroxicam]; Gabapentin; Hctz [hydrochlorothiazide]; Isoptin [verapamil]; Keflex [cephalexin]; Lorazepam; Lyrica [pregabalin]; Meclomen [meclofenamate]; Morphine; Nabumetone; Nucynta [tapentadol]; Ofloxacin; Peanut butter flavor; Percocet [oxycodone-acetaminophen]; Premarin [conjugated estrogens]; Prinivil [lisinopril]; Seldane [terfenadine]; Strawberry flavor; Sulfa antibiotics; Tagamet [cimetidine]; Temazepam; Terbinafine and related; Tetracyclines & related; Trazodone and nefazodone; Voltaren [diclofenac sodium];  Zanaflex

## 2019-07-18 NOTE — PROGRESS NOTES
Imaging: chest XR- multiple findings; L subclavian dual lead pacemaker; no visable pneumothorax         PMH: CHF, HTN    Family / Caregiver Present: Yes()    Diagnosis: bradycardia    Subjective  Subjective: Pt found in bed upon entry; agreeable to OT with max encouragement. \"You can't push me. I don't do well when I'm pushed. \"    Pain: knee pain with mobility, not rated     Orientation  Orientation  Overall Orientation Status: Within Functional Limits     Objective     Treatment included functional transfer training, ADLs, and patient education. ADL  Grooming: Setup  LE Dressing: Dependent/Total( assisting to don/doff socks)  Toileting: Dependent/Total(pure wick in place, removed for tx. Denies need to attempt during session)    Balance  Sitting Balance: Supervision(sitting unsupported at eob)    Standing Balance  Activity: transfers, few steps bed <> chair    Functional Mobility  Functional - Mobility Device: Rolling Walker  Activity: Other(few steps bed <> chair)  Assist Level: Minimal assistance  Functional Mobility Comments: Improved steadiness from previous session. Reporting knee pain with standing tasks. Fatigues rather quickly. Declines to attempt to ambulate further    Bed mobility  Supine to Sit: Minimal assistance(HOB elevated, assist at LLE)  Sit to Supine: Moderate assistance(to lift LEs into bed)    Transfers  Sit to stand: (pt places R hand on middle of walker, not using LLE for transfer )  Stand to sit: Minimal assistance(cues for hand placement)    Cognition  Overall Cognitive Status: [facility]         Plan   Plan  Times per week: 2-5x  Times per day: Daily  Current Treatment Recommendations: Strengthening, Safety Education & Training, Balance Training, Patient/Caregiver Education & Training, Self-Care / ADL, Functional Mobility Training, Endurance Training  If patient discharges prior to next treatment, this note will serve as discharge summary.  Will continue per POC if patient does not discharge.       AM-PAC Score        AM-PAC Inpatient Daily Activity Raw Score: 15 (07/18/19 1440)  AM-PAC Inpatient ADL T-Scale Score : 34.69 (07/18/19 1440)  ADL Inpatient CMS 0-100% Score: 56.46 (07/18/19 1440)  ADL Inpatient CMS G-Code Modifier : CK (07/18/19 1440)    Goals  Short term goals  Time Frame for Short term goals: by d/c  Short term goal 1: toilet transfer with Min A - partially met, continue for consistency  Short term goal 2: LE dressing with Mod A and AE prn - not met  Short term goal 3: stance x 3 min with CGA for ADLs - not met    Patient Goals   Patient goals : Pt wants to go home       Therapy Time   Individual Concurrent Group Co-treatment   Time In [address]         Time Out 1219         Minutes 39         Timed Code Treatment Minutes: [address], OT

## 2019-07-18 NOTE — PROGRESS NOTES
Discharge note: Patient has been seen by doctor. Discharge order obtained, and discharge instructions reviewed. Patient educated, using the teach back method, about follow up instructions and discharge instructions. A completed copy of the AVS instructions given to patient and all questions answered. IV catheter removed without complaints, catheter intact, site WNL.  Discharged to home via [facility], per family request.   Electronically signed by Ayanna Izquierdo RN on 7/18/2019 at 4:33 PM

## 2019-07-20 LAB
BLOOD CULTURE, ROUTINE: NORMAL
CULTURE, BLOOD 2: NORMAL

## 2019-07-22 NOTE — PROGRESS NOTES
[facility], operated by [facility]   Electrophysiology  Office Visit  Date: 7/23/2019    Chief Complaint   Patient presents with    Bradycardia    Dizziness       Cardiac HX: Sean Conway is a [de-identified] y.o. woman with a h/o HTN, CKD stage III, obesity, chronic dCHF, who p/w fatigue, lightheadedness and LOC, found to have bradycardia and type II AV block, temp PPM placed, BB held and AVB/bradycardia persisted. S/p dual chamber PPM (7/16/19, Dr. Narciso Cushing). Interval History/HPI: Patient is here for f/u. She is doing well. She is back at home. She does admit to feeling better with the pacemaker. Left chest incision is healed well. She denies chest pain, shortness of breath, PND, orthopnea. Her edema is a little more on the left side per her  however she has not been elevating her legs. Home medications:   Current Outpatient Medications on File Prior to Visit   Medication Sig Dispense Refill    triamcinolone (ARISTOCORT) 0.5 % ointment Apply topically 2 times daily Apply topically 2 times daily.  diphenhydrAMINE (BENADRYL) 25 MG tablet Take 25 mg by mouth every 6 hours as needed for Itching      Glucosamine-Chondroit-Vit C-Mn (GLUCOSAMINE 1500 COMPLEX PO) Take 1,500 mg by mouth daily      senna (SENOKOT) 8.6 MG tablet Take 1 tablet by mouth daily      esomeprazole Magnesium (NEXIUM) 40 MG PACK Take 40 mg by mouth daily      CHONDROITIN SULFATE A PO Take 1,103 mg by mouth      isosorbide mononitrate (IMDUR) 30 MG extended release tablet Take 1 tablet by mouth daily 30 tablet 3    torsemide (DEMADEX) 20 MG tablet Take 1 tablet by mouth daily 30 tablet 5    traMADol (ULTRAM) 50 MG tablet Take 1 tablet by mouth 2 times daily for 120 days.  240 tablet 0    acetaminophen (TYLENOL) 500 MG tablet Take 500 mg by mouth      mometasone (NASONEX) 50 MCG/ACT nasal spray 2 sprays by Nasal route daily      PROMETHAZINE-DM PO Take by mouth      diclofenac sodium 1 % GEL Apply 4 g topically daily as needed      ventricular systolic function appears normal with   an estimated ejection fraction of 55-60% based off the parasternal images.   Cannot exclude regional wall motion abnormalities secondary to poor   endocardial visualization. Diastolic filling parameters suggests grade II   diastolic dysfunction.   Aortic valve leaflets appear thickened at the annulus. Individual aortic   valve leaflets are not clearly visualized. No evidence of aortic valve   regurgitation. No evidence of aortic valve stenosis.   There is mild tricuspid regurgitation present. Problem List:   Patient Active Problem List    Diagnosis Date Noted    Essential hypertension 10/06/2017     Priority: High    Chronic diastolic heart failure (HCC)      Priority: High    Bilateral lower extremity edema      Priority: High    Pacemaker 07/23/2019    UTI (urinary tract infection) 07/16/2019    Acute kidney injury superimposed on CKD ([address] 07/16/2019    Syncope and collapse     Bradycardia 07/13/2019    Complete heart block (HCC)     Chronic diastolic congestive heart failure ([address] 12/21/2017        Assessment:   1. Complete heart block ([address]    2. Pacemaker    3. Chronic diastolic CHF (congestive heart failure) ([address]      Cardiac HX: Lissa Salgado is a [de-identified] y.o. woman with a h/o HTN, CKD stage III, obesity, chronic dCHF, who p/w fatigue, lightheadedness and LOC, found to have bradycardia and type II AV block, temp PPM placed, BB held and AVB/bradycardia persisted. S/p dual chamber PPM (7/16/19, Dr. Jackelin Arce     High degree AV block  - S/p dual chamber MDT PPM  - Device check shows 5.2% AP, 100% , no arrhythmias, other parameters stable. - Reviewed wound care and activity instructions with patient and   - L chest incision is CDI     Chronic dCHF  - On torsemide 20 mg QD  - ECG ordered and results personally reviewed     EF of 55 to 60%  No ACEi for systolic HF d/t low BP  No known CAD  No known AF  No Tobacco use.   .     All questions and concerns were addressed to the patient/family. Alternatives to my treatment were discussed. The note was completed using EMR. Every effort was made to ensure accuracy; however, inadvertent computerized transcription errors may be present. Patient received education regarding their diagnosis, treatment and medications while in the office today.       Aisha King [address] [facility]

## 2019-07-23 ENCOUNTER — NURSE ONLY (OUTPATIENT)
Dept: CARDIOLOGY CLINIC | Age: 79
End: 2019-07-23
Payer: MEDICARE

## 2019-07-23 ENCOUNTER — OFFICE VISIT (OUTPATIENT)
Dept: CARDIOLOGY CLINIC | Age: 79
End: 2019-07-23
Payer: MEDICARE

## 2019-07-23 VITALS
DIASTOLIC BLOOD PRESSURE: 70 MMHG | SYSTOLIC BLOOD PRESSURE: 126 MMHG | BODY MASS INDEX: 37.91 KG/M2 | HEIGHT: 62 IN | HEART RATE: 77 BPM | WEIGHT: 206 LBS

## 2019-07-23 DIAGNOSIS — I44.2 COMPLETE HEART BLOCK (HCC): Primary | ICD-10-CM

## 2019-07-23 DIAGNOSIS — Z95.0 PACEMAKER: ICD-10-CM

## 2019-07-23 DIAGNOSIS — Z95.0 CARDIAC PACEMAKER IN SITU: Primary | ICD-10-CM

## 2019-07-23 DIAGNOSIS — I50.32 CHRONIC DIASTOLIC CHF (CONGESTIVE HEART FAILURE) (HCC): ICD-10-CM

## 2019-07-23 DIAGNOSIS — I44.2 COMPLETE HEART BLOCK (HCC): ICD-10-CM

## 2019-07-23 PROCEDURE — 1090F PRES/ABSN URINE INCON ASSESS: CPT | Performed by: NURSE PRACTITIONER

## 2019-07-23 PROCEDURE — G8417 CALC BMI ABV UP PARAM F/U: HCPCS | Performed by: NURSE PRACTITIONER

## 2019-07-23 PROCEDURE — G8427 DOCREV CUR MEDS BY ELIG CLIN: HCPCS | Performed by: NURSE PRACTITIONER

## 2019-07-23 PROCEDURE — 1123F ACP DISCUSS/DSCN MKR DOCD: CPT | Performed by: NURSE PRACTITIONER

## 2019-07-23 PROCEDURE — 99214 OFFICE O/P EST MOD 30 MIN: CPT | Performed by: NURSE PRACTITIONER

## 2019-07-23 PROCEDURE — 4040F PNEUMOC VAC/ADMIN/RCVD: CPT | Performed by: NURSE PRACTITIONER

## 2019-07-23 PROCEDURE — 1111F DSCHRG MED/CURRENT MED MERGE: CPT | Performed by: NURSE PRACTITIONER

## 2019-07-23 PROCEDURE — 93000 ELECTROCARDIOGRAM COMPLETE: CPT | Performed by: NURSE PRACTITIONER

## 2019-07-23 PROCEDURE — G8400 PT W/DXA NO RESULTS DOC: HCPCS | Performed by: NURSE PRACTITIONER

## 2019-07-23 PROCEDURE — 1036F TOBACCO NON-USER: CPT | Performed by: NURSE PRACTITIONER

## 2019-07-23 PROCEDURE — 93280 PM DEVICE PROGR EVAL DUAL: CPT | Performed by: INTERNAL MEDICINE

## 2019-07-23 RX ORDER — DIPHENHYDRAMINE HCL 25 MG
25 TABLET ORAL EVERY 6 HOURS PRN
COMMUNITY

## 2019-07-23 RX ORDER — TRIAMCINOLONE ACETONIDE 5 MG/G
OINTMENT TOPICAL 2 TIMES DAILY
COMMUNITY

## 2019-07-23 RX ORDER — SENNA PLUS 8.6 MG/1
1 TABLET ORAL DAILY
COMMUNITY

## 2019-07-23 RX ORDER — ESOMEPRAZOLE MAGNESIUM 40 MG/1
40 FOR SUSPENSION ORAL DAILY
COMMUNITY
End: 2019-08-21

## 2019-08-15 PROBLEM — N39.0 UTI (URINARY TRACT INFECTION): Status: RESOLVED | Noted: 2019-07-16 | Resolved: 2019-08-15

## 2019-08-20 NOTE — PROGRESS NOTES
[Hydrochlorothiazide]      BP goes too low    Isoptin [Verapamil]     Keflex [Cephalexin]     Lorazepam Other (See Comments)     \"Shake can't stop\"    Lyrica [Pregabalin] Other (See Comments)     \"shake and could not stop\"    Meclomen [Meclofenamate]     Morphine Swelling     dizziness    Nabumetone     Nucynta [Tapentadol]      dizzy    Ofloxacin     Peanut Butter Flavor     Percocet [Oxycodone-Acetaminophen] Swelling     Tongue swelling    Premarin [Conjugated Estrogens] Other (See Comments)     \"extremely sick\"    Prinivil [Lisinopril] Swelling    Seldane [Terfenadine]     Strawberry Flavor     Sulfa Antibiotics     Tagamet [Cimetidine]     Temazepam Swelling    Terbinafine And Related     Tetracyclines & Related      Extremely sick    Trazodone And Nefazodone Other (See Comments)     \"shake and could not stop\"    Voltaren [Diclofenac Sodium]     Zanaflex [Tizanidine Hcl]     Amoxicillin Rash    Penicillins Rash       Social History:  Reviewed. reports that she has never smoked. She has never used smokeless tobacco. She reports that she does not drink alcohol or use drugs. Family History:  Reviewed. family history is not on file. Review of System:    · Constitutional: No fevers, chills. · Eyes: No visual changes or diplopia. No scleral icterus. · ENT: No Headaches. No mouth sores or sore throat. · Cardiovascular: No for chest pain, No for dyspnea on exertion, No for palpitations or No for loss of consciousness. No cough, hemoptysis, No for pleuritic pain, or phlebitis. · Respiratory: No for cough or wheezing. No hematemesis. · Gastrointestinal: No abdominal pain, blood in stools. · Genitourinary: No dysuria, or hematuria. · Musculoskeletal: No gait disturbance,    · Integumentary: No rash or pruritis. · Neurological: No headache, change in muscle strength, numbness or tingling. · Psychiatric: No anxiety, or depression. · Endocrine: No temperature intolerance.  No estimated ejection fraction of 55-60% based off the parasternal images.   Cannot exclude regional wall motion abnormalities secondary to poor   endocardial visualization. Diastolic filling parameters suggests grade II   diastolic dysfunction.   Aortic valve leaflets appear thickened at the annulus. Individual aortic   valve leaflets are not clearly visualized. No evidence of aortic valve   regurgitation. No evidence of aortic valve stenosis.   There is mild tricuspid regurgitation present. Problem List:   Patient Active Problem List    Diagnosis Date Noted    Essential hypertension 10/06/2017     Priority: High    Chronic diastolic heart failure ([address]      Priority: High    Bilateral lower extremity edema      Priority: High    Pacemaker 07/23/2019    Acute kidney injury superimposed on CKD ([address] 07/16/2019    Syncope and collapse     Bradycardia 07/13/2019    Complete heart block (HCC)     Chronic diastolic congestive heart failure ([address] 12/21/2017        Assessment:   1. Complete heart block ([address]    2. Sinus bradycardia    3. Pacemaker    4. Chronic diastolic heart failure ([address]         Cardiac HX: Veronica Lee is a [de-identified] y.o. woman with a h/o HTN, CKD stage III, obesity, chronic dCHF, who p/w fatigue, lightheadedness and LOC, found to have bradycardia and type II AV block, temp PPM placed, BB held and AVB/bradycardia persisted. S/p dual chamber PPM (7/16/19, Dr. Chivo Rudolph     High degree AV block  - S/p dual chamber MDT PPM with symptomatic improvement  - Device check shows 11.6% AP, 99.9% , no arrhythmias, other parameters stable.   - MVP not turned on   - Reviewed wound care and activity instructions with patient and      Chronic dCHF  - On torsemide 20 mg QD- encouraged patient to restart  - Last Cr 1.9 K+ 4.6- follows with Dr. Neva Cr  - Daily weights  - Lifestyle modification - low Na+ diet, weight loss, exercise  - ECG ordered and results personally reviewed     EF of 55 to 60%  No

## 2019-08-21 ENCOUNTER — OFFICE VISIT (OUTPATIENT)
Dept: CARDIOLOGY CLINIC | Age: 79
End: 2019-08-21
Payer: MEDICARE

## 2019-08-21 ENCOUNTER — NURSE ONLY (OUTPATIENT)
Dept: CARDIOLOGY CLINIC | Age: 79
End: 2019-08-21
Payer: MEDICARE

## 2019-08-21 VITALS
SYSTOLIC BLOOD PRESSURE: 134 MMHG | HEIGHT: 62 IN | BODY MASS INDEX: 36.8 KG/M2 | WEIGHT: 200 LBS | DIASTOLIC BLOOD PRESSURE: 74 MMHG | HEART RATE: 73 BPM

## 2019-08-21 DIAGNOSIS — R00.1 BRADYCARDIA: ICD-10-CM

## 2019-08-21 DIAGNOSIS — R00.1 SINUS BRADYCARDIA: ICD-10-CM

## 2019-08-21 DIAGNOSIS — I50.32 CHRONIC DIASTOLIC HEART FAILURE (HCC): ICD-10-CM

## 2019-08-21 DIAGNOSIS — Z95.0 PACEMAKER: ICD-10-CM

## 2019-08-21 DIAGNOSIS — I44.2 COMPLETE HEART BLOCK (HCC): ICD-10-CM

## 2019-08-21 DIAGNOSIS — Z95.0 CARDIAC PACEMAKER IN SITU: ICD-10-CM

## 2019-08-21 DIAGNOSIS — I44.2 COMPLETE HEART BLOCK (HCC): Primary | ICD-10-CM

## 2019-08-21 PROCEDURE — 93280 PM DEVICE PROGR EVAL DUAL: CPT | Performed by: INTERNAL MEDICINE

## 2019-08-21 PROCEDURE — G8400 PT W/DXA NO RESULTS DOC: HCPCS | Performed by: NURSE PRACTITIONER

## 2019-08-21 PROCEDURE — 1036F TOBACCO NON-USER: CPT | Performed by: NURSE PRACTITIONER

## 2019-08-21 PROCEDURE — 1090F PRES/ABSN URINE INCON ASSESS: CPT | Performed by: NURSE PRACTITIONER

## 2019-08-21 PROCEDURE — 4040F PNEUMOC VAC/ADMIN/RCVD: CPT | Performed by: NURSE PRACTITIONER

## 2019-08-21 PROCEDURE — G8417 CALC BMI ABV UP PARAM F/U: HCPCS | Performed by: NURSE PRACTITIONER

## 2019-08-21 PROCEDURE — 99214 OFFICE O/P EST MOD 30 MIN: CPT | Performed by: NURSE PRACTITIONER

## 2019-08-21 PROCEDURE — 1123F ACP DISCUSS/DSCN MKR DOCD: CPT | Performed by: NURSE PRACTITIONER

## 2019-08-21 PROCEDURE — G8427 DOCREV CUR MEDS BY ELIG CLIN: HCPCS | Performed by: NURSE PRACTITIONER

## 2019-10-03 DIAGNOSIS — G89.29 OTHER CHRONIC PAIN: Primary | ICD-10-CM

## 2019-10-03 RX ORDER — TRAMADOL HYDROCHLORIDE 50 MG/1
50 TABLET ORAL 2 TIMES DAILY PRN
Qty: 60 TABLET | Refills: 3 | Status: SHIPPED | OUTPATIENT
Start: 2019-10-03 | End: 2020-01-16 | Stop reason: SDUPTHER

## 2019-10-22 ENCOUNTER — OFFICE VISIT (OUTPATIENT)
Dept: CARDIOLOGY CLINIC | Age: 79
End: 2019-10-22
Payer: MEDICARE

## 2019-10-22 ENCOUNTER — NURSE ONLY (OUTPATIENT)
Dept: CARDIOLOGY CLINIC | Age: 79
End: 2019-10-22
Payer: MEDICARE

## 2019-10-22 VITALS — SYSTOLIC BLOOD PRESSURE: 130 MMHG | HEART RATE: 84 BPM | DIASTOLIC BLOOD PRESSURE: 80 MMHG

## 2019-10-22 VITALS
SYSTOLIC BLOOD PRESSURE: 130 MMHG | BODY MASS INDEX: 37.19 KG/M2 | WEIGHT: 197 LBS | HEIGHT: 61 IN | DIASTOLIC BLOOD PRESSURE: 80 MMHG | HEART RATE: 84 BPM

## 2019-10-22 DIAGNOSIS — I50.32 CHRONIC DIASTOLIC CONGESTIVE HEART FAILURE (HCC): Primary | ICD-10-CM

## 2019-10-22 DIAGNOSIS — I10 ESSENTIAL HYPERTENSION: ICD-10-CM

## 2019-10-22 DIAGNOSIS — I51.89 DIASTOLIC DYSFUNCTION: ICD-10-CM

## 2019-10-22 DIAGNOSIS — Z95.0 PACEMAKER: ICD-10-CM

## 2019-10-22 DIAGNOSIS — I50.32 CHRONIC DIASTOLIC HEART FAILURE (HCC): ICD-10-CM

## 2019-10-22 DIAGNOSIS — I44.2 COMPLETE HEART BLOCK (HCC): Primary | ICD-10-CM

## 2019-10-22 DIAGNOSIS — Z95.0 CARDIAC PACEMAKER IN SITU: ICD-10-CM

## 2019-10-22 DIAGNOSIS — I44.2 COMPLETE HEART BLOCK (HCC): ICD-10-CM

## 2019-10-22 DIAGNOSIS — I44.2 CHB (COMPLETE HEART BLOCK) (HCC): ICD-10-CM

## 2019-10-22 PROCEDURE — 1036F TOBACCO NON-USER: CPT | Performed by: INTERNAL MEDICINE

## 2019-10-22 PROCEDURE — 93000 ELECTROCARDIOGRAM COMPLETE: CPT | Performed by: INTERNAL MEDICINE

## 2019-10-22 PROCEDURE — 1123F ACP DISCUSS/DSCN MKR DOCD: CPT | Performed by: INTERNAL MEDICINE

## 2019-10-22 PROCEDURE — 4040F PNEUMOC VAC/ADMIN/RCVD: CPT | Performed by: INTERNAL MEDICINE

## 2019-10-22 PROCEDURE — G8400 PT W/DXA NO RESULTS DOC: HCPCS | Performed by: INTERNAL MEDICINE

## 2019-10-22 PROCEDURE — 1090F PRES/ABSN URINE INCON ASSESS: CPT | Performed by: INTERNAL MEDICINE

## 2019-10-22 PROCEDURE — 99214 OFFICE O/P EST MOD 30 MIN: CPT | Performed by: INTERNAL MEDICINE

## 2019-10-22 PROCEDURE — G8427 DOCREV CUR MEDS BY ELIG CLIN: HCPCS | Performed by: INTERNAL MEDICINE

## 2019-10-22 PROCEDURE — 93280 PM DEVICE PROGR EVAL DUAL: CPT | Performed by: INTERNAL MEDICINE

## 2019-10-22 PROCEDURE — 99213 OFFICE O/P EST LOW 20 MIN: CPT | Performed by: INTERNAL MEDICINE

## 2019-10-22 PROCEDURE — G8484 FLU IMMUNIZE NO ADMIN: HCPCS | Performed by: INTERNAL MEDICINE

## 2019-10-22 PROCEDURE — G8417 CALC BMI ABV UP PARAM F/U: HCPCS | Performed by: INTERNAL MEDICINE

## 2019-10-22 ASSESSMENT — ENCOUNTER SYMPTOMS
COUGH: 0
CHEST TIGHTNESS: 0
CHOKING: 0
SHORTNESS OF BREATH: 0

## 2020-01-16 DIAGNOSIS — N17.9 AKI (ACUTE KIDNEY INJURY) (HCC): ICD-10-CM

## 2020-01-16 DIAGNOSIS — G89.29 OTHER CHRONIC PAIN: ICD-10-CM

## 2020-01-16 LAB
ALBUMIN SERPL-MCNC: 3.9 G/DL (ref 3.4–5)
ANION GAP SERPL CALCULATED.3IONS-SCNC: 15 MMOL/L (ref 3–16)
BUN BLDV-MCNC: 42 MG/DL (ref 7–20)
CALCIUM SERPL-MCNC: 10.6 MG/DL (ref 8.3–10.6)
CHLORIDE BLD-SCNC: 96 MMOL/L (ref 99–110)
CO2: 25 MMOL/L (ref 21–32)
CREAT SERPL-MCNC: 1.7 MG/DL (ref 0.6–1.2)
GFR AFRICAN AMERICAN: 35
GFR NON-AFRICAN AMERICAN: 29
GLUCOSE BLD-MCNC: 123 MG/DL (ref 70–99)
PHOSPHORUS: 2.7 MG/DL (ref 2.5–4.9)
POTASSIUM SERPL-SCNC: 4.1 MMOL/L (ref 3.5–5.1)
PRO-BNP: 900 PG/ML (ref 0–449)
SODIUM BLD-SCNC: 136 MMOL/L (ref 136–145)

## 2020-01-28 ENCOUNTER — NURSE ONLY (OUTPATIENT)
Dept: CARDIOLOGY CLINIC | Age: 80
End: 2020-01-28
Payer: MEDICARE

## 2020-01-28 PROCEDURE — 93294 REM INTERROG EVL PM/LDLS PM: CPT | Performed by: INTERNAL MEDICINE

## 2020-01-28 PROCEDURE — 93296 REM INTERROG EVL PM/IDS: CPT | Performed by: INTERNAL MEDICINE

## 2020-01-30 NOTE — PROGRESS NOTES
Carelink transmission shows normal sensing and pacing function. See interrogation for more details. No new arrhythmias.  99.9%  Follow up in 3 months via carelink.

## 2020-02-24 ENCOUNTER — OFFICE VISIT (OUTPATIENT)
Dept: CARDIOLOGY CLINIC | Age: 80
End: 2020-02-24
Payer: MEDICARE

## 2020-02-24 VITALS
BODY MASS INDEX: 36.8 KG/M2 | HEART RATE: 60 BPM | HEIGHT: 62 IN | SYSTOLIC BLOOD PRESSURE: 130 MMHG | DIASTOLIC BLOOD PRESSURE: 70 MMHG | OXYGEN SATURATION: 99 % | WEIGHT: 200 LBS

## 2020-02-24 PROCEDURE — 1123F ACP DISCUSS/DSCN MKR DOCD: CPT | Performed by: INTERNAL MEDICINE

## 2020-02-24 PROCEDURE — 4040F PNEUMOC VAC/ADMIN/RCVD: CPT | Performed by: INTERNAL MEDICINE

## 2020-02-24 PROCEDURE — G8400 PT W/DXA NO RESULTS DOC: HCPCS | Performed by: INTERNAL MEDICINE

## 2020-02-24 PROCEDURE — 1090F PRES/ABSN URINE INCON ASSESS: CPT | Performed by: INTERNAL MEDICINE

## 2020-02-24 PROCEDURE — G8417 CALC BMI ABV UP PARAM F/U: HCPCS | Performed by: INTERNAL MEDICINE

## 2020-02-24 PROCEDURE — 99214 OFFICE O/P EST MOD 30 MIN: CPT | Performed by: INTERNAL MEDICINE

## 2020-02-24 PROCEDURE — 1036F TOBACCO NON-USER: CPT | Performed by: INTERNAL MEDICINE

## 2020-02-24 PROCEDURE — G8484 FLU IMMUNIZE NO ADMIN: HCPCS | Performed by: INTERNAL MEDICINE

## 2020-02-24 PROCEDURE — G8427 DOCREV CUR MEDS BY ELIG CLIN: HCPCS | Performed by: INTERNAL MEDICINE

## 2020-02-24 ASSESSMENT — ENCOUNTER SYMPTOMS
CHEST TIGHTNESS: 0
CHOKING: 0
COUGH: 0
SHORTNESS OF BREATH: 0

## 2020-02-24 NOTE — PROGRESS NOTES
Subjective:      Patient ID: Girish Mckenzie is a 78 y.o. female. Congestive Heart Failure   Pertinent negatives include no chest pain, fatigue, palpitations or shortness of breath. Here for follow up CHF/diastolic dysfunction/HTN. BP good. No edema. Wt stable. No sob. No pnd. No orthopnea. No chest pain. No tachycardia/syncope. BP good at home.      Past Medical History:   Diagnosis Date    CHF (congestive heart failure) (HCC)     Hypertension      Past Surgical History:   Procedure Laterality Date    CHOLECYSTECTOMY      HYSTERECTOMY       Social History     Socioeconomic History    Marital status:      Spouse name: Not on file    Number of children: Not on file    Years of education: Not on file    Highest education level: Not on file   Occupational History    Not on file   Social Needs    Financial resource strain: Not on file    Food insecurity:     Worry: Not on file     Inability: Not on file    Transportation needs:     Medical: Not on file     Non-medical: Not on file   Tobacco Use    Smoking status: Never Smoker    Smokeless tobacco: Never Used   Substance and Sexual Activity    Alcohol use: No    Drug use: No    Sexual activity: Not Currently   Lifestyle    Physical activity:     Days per week: Not on file     Minutes per session: Not on file    Stress: Not on file   Relationships    Social connections:     Talks on phone: Not on file     Gets together: Not on file     Attends [employer] service: Not on file     Active member of club or organization: Not on file     Attends meetings of clubs or organizations: Not on file     Relationship status: Not on file    Intimate partner violence:     Fear of current or ex partner: Not on file     Emotionally abused: Not on file     Physically abused: Not on file     Forced sexual activity: Not on file   Other Topics Concern    Not on file   Social History Narrative    Not on file     FH reviewed personally with jef mauricio  cardiac issues      Vitals:    02/24/20 1256   BP: 130/70   Pulse: 60   SpO2: 99%       wt 200      Review of Systems   Constitutional: Negative for activity change, appetite change and fatigue. Respiratory: Negative for cough, choking, chest tightness and shortness of breath. Cardiovascular: Negative for chest pain, palpitations and leg swelling. Denies PND or orthopnea. No tachycardia or syncope. Neurological: Positive for syncope. Negative for dizziness and light-headedness. Psychiatric/Behavioral: Negative for agitation, behavioral problems and confusion. All other systems reviewed and are negative. Objective:   Physical Exam   Constitutional: She is oriented to person, place, and time. She appears well-developed and well-nourished. No distress. HENT:   Head: Normocephalic and atraumatic. Eyes: Conjunctivae and EOM are normal. Right eye exhibits no discharge. Left eye exhibits no discharge. Neck: Normal range of motion. No JVD present. Cardiovascular: Normal rate, regular rhythm, S1 normal, S2 normal and normal heart sounds. Exam reveals no gallop. No murmur heard. Pulses:       Radial pulses are 2+ on the right side and 2+ on the left side. Pulmonary/Chest: Effort normal and breath sounds normal. No respiratory distress. She has no wheezes. She has no rales. Abdominal: Soft. Bowel sounds are normal. There is no abdominal tenderness. Musculoskeletal: Normal range of motion. General: Edema present. Comments: Bilateral edema   Neurological: She is alert and oriented to person, place, and time. Skin: Skin is warm and dry. Psychiatric: She has a normal mood and affect. Her behavior is normal. Thought content normal.       Assessment:       Diagnosis Orders   1. Chronic diastolic congestive heart failure ([address]     2. CHB (complete heart block) (HCC)     3. Pacemaker     4. Essential hypertension     5. Diastolic dysfunction             Plan: Bp good.

## 2020-04-29 ENCOUNTER — NURSE ONLY (OUTPATIENT)
Dept: CARDIOLOGY CLINIC | Age: 80
End: 2020-04-29
Payer: MEDICARE

## 2020-04-29 PROCEDURE — 93294 REM INTERROG EVL PM/LDLS PM: CPT | Performed by: INTERNAL MEDICINE

## 2020-04-29 PROCEDURE — 93296 REM INTERROG EVL PM/IDS: CPT | Performed by: INTERNAL MEDICINE

## 2020-05-26 DIAGNOSIS — G89.29 OTHER CHRONIC PAIN: ICD-10-CM

## 2020-05-26 DIAGNOSIS — N17.9 AKI (ACUTE KIDNEY INJURY) (HCC): ICD-10-CM

## 2020-05-26 LAB
ALBUMIN SERPL-MCNC: 4 G/DL (ref 3.4–5)
ANION GAP SERPL CALCULATED.3IONS-SCNC: 15 MMOL/L (ref 3–16)
BUN BLDV-MCNC: 56 MG/DL (ref 7–20)
CALCIUM SERPL-MCNC: 11 MG/DL (ref 8.3–10.6)
CHLORIDE BLD-SCNC: 95 MMOL/L (ref 99–110)
CO2: 28 MMOL/L (ref 21–32)
CREAT SERPL-MCNC: 2.5 MG/DL (ref 0.6–1.2)
GFR AFRICAN AMERICAN: 22
GFR NON-AFRICAN AMERICAN: 18
GLUCOSE BLD-MCNC: 106 MG/DL (ref 70–99)
PHOSPHORUS: 3.9 MG/DL (ref 2.5–4.9)
POTASSIUM SERPL-SCNC: 3.8 MMOL/L (ref 3.5–5.1)
SODIUM BLD-SCNC: 138 MMOL/L (ref 136–145)

## 2020-06-29 ENCOUNTER — OFFICE VISIT (OUTPATIENT)
Dept: CARDIOLOGY CLINIC | Age: 80
End: 2020-06-29
Payer: MEDICARE

## 2020-06-29 VITALS — HEART RATE: 72 BPM | DIASTOLIC BLOOD PRESSURE: 80 MMHG | SYSTOLIC BLOOD PRESSURE: 130 MMHG

## 2020-06-29 PROCEDURE — 1090F PRES/ABSN URINE INCON ASSESS: CPT | Performed by: INTERNAL MEDICINE

## 2020-06-29 PROCEDURE — 1123F ACP DISCUSS/DSCN MKR DOCD: CPT | Performed by: INTERNAL MEDICINE

## 2020-06-29 PROCEDURE — G8417 CALC BMI ABV UP PARAM F/U: HCPCS | Performed by: INTERNAL MEDICINE

## 2020-06-29 PROCEDURE — G8400 PT W/DXA NO RESULTS DOC: HCPCS | Performed by: INTERNAL MEDICINE

## 2020-06-29 PROCEDURE — 1036F TOBACCO NON-USER: CPT | Performed by: INTERNAL MEDICINE

## 2020-06-29 PROCEDURE — 99214 OFFICE O/P EST MOD 30 MIN: CPT | Performed by: INTERNAL MEDICINE

## 2020-06-29 PROCEDURE — G8427 DOCREV CUR MEDS BY ELIG CLIN: HCPCS | Performed by: INTERNAL MEDICINE

## 2020-06-29 PROCEDURE — 4040F PNEUMOC VAC/ADMIN/RCVD: CPT | Performed by: INTERNAL MEDICINE

## 2020-06-29 RX ORDER — LEVOFLOXACIN 250 MG/1
TABLET ORAL
COMMUNITY
Start: 2020-06-24

## 2020-06-29 RX ORDER — PREDNISONE 20 MG/1
TABLET ORAL
COMMUNITY
Start: 2020-06-23 | End: 2022-03-04 | Stop reason: ALTCHOICE

## 2020-06-29 RX ORDER — ONDANSETRON 4 MG/1
TABLET, FILM COATED ORAL
COMMUNITY
Start: 2020-06-23

## 2020-06-29 ASSESSMENT — ENCOUNTER SYMPTOMS
CHOKING: 0
COUGH: 0
SHORTNESS OF BREATH: 0
CHEST TIGHTNESS: 0

## 2020-06-29 NOTE — PROGRESS NOTES
Subjective:      Patient ID: Sdueep Gonzalez is a 78 y.o. female. Congestive Heart Failure   Pertinent negatives include no chest pain, fatigue, palpitations or shortness of breath. Here for follow up CHF/diastolic dysfunction/HTN. BP good. No edema. Wt stable. No sob. No pnd. No orthopnea. No chest pain. No tachycardia/syncope. BP good at home.      Past Medical History:   Diagnosis Date    CHF (congestive heart failure) (HCC)     Hypertension      Past Surgical History:   Procedure Laterality Date    CHOLECYSTECTOMY      HYSTERECTOMY       Social History     Socioeconomic History    Marital status:      Spouse name: Not on file    Number of children: Not on file    Years of education: Not on file    Highest education level: Not on file   Occupational History    Not on file   Social Needs    Financial resource strain: Not on file    Food insecurity     Worry: Not on file     Inability: Not on file    Transportation needs     Medical: Not on file     Non-medical: Not on file   Tobacco Use    Smoking status: Never Smoker    Smokeless tobacco: Never Used   Substance and Sexual Activity    Alcohol use: No    Drug use: No    Sexual activity: Not Currently   Lifestyle    Physical activity     Days per week: Not on file     Minutes per session: Not on file    Stress: Not on file   Relationships    Social connections     Talks on phone: Not on file     Gets together: Not on file     Attends [employer] service: Not on file     Active member of club or organization: Not on file     Attends meetings of clubs or organizations: Not on file     Relationship status: Not on file    Intimate partner violence     Fear of current or ex partner: Not on file     Emotionally abused: Not on file     Physically abused: Not on file     Forced sexual activity: Not on file   Other Topics Concern    Not on file   Social History Narrative    Not on file     FH reviewed personally with jef mauricio cardiac issues      Vitals:    06/29/20 1320   BP: 130/80   Pulse: 72       wt 198      Review of Systems   Constitutional: Negative for activity change, appetite change and fatigue. Respiratory: Negative for cough, choking, chest tightness and shortness of breath. Cardiovascular: Negative for chest pain, palpitations and leg swelling. Denies PND or orthopnea. No tachycardia or syncope. Neurological: Positive for syncope. Negative for dizziness and light-headedness. Psychiatric/Behavioral: Negative for agitation, behavioral problems and confusion. All other systems reviewed and are negative. Objective:   Physical Exam   Constitutional: She is oriented to person, place, and time. She appears well-developed and well-nourished. No distress. HENT:   Head: Normocephalic and atraumatic. Eyes: Conjunctivae and EOM are normal. Right eye exhibits no discharge. Left eye exhibits no discharge. Neck: Normal range of motion. No JVD present. Cardiovascular: Normal rate, regular rhythm, S1 normal, S2 normal and normal heart sounds. Exam reveals no gallop. No murmur heard. Pulses:       Radial pulses are 2+ on the right side and 2+ on the left side. Pulmonary/Chest: Effort normal and breath sounds normal. No respiratory distress. She has no wheezes. She has no rales. Abdominal: Soft. Bowel sounds are normal. There is no abdominal tenderness. Musculoskeletal: Normal range of motion. General: Edema present. Comments: Tr Bilateral edema   Neurological: She is alert and oriented to person, place, and time. Skin: Skin is warm and dry. Psychiatric: She has a normal mood and affect. Her behavior is normal. Thought content normal.       Assessment:       Diagnosis Orders   1. Chronic diastolic congestive heart failure ([address]     2. CHB (complete heart block) ([facility])     3. Pacemaker     4. Diastolic dysfunction     5. Essential hypertension             Plan:      CV stable. Bp good.

## 2020-07-02 DIAGNOSIS — N18.4 CKD (CHRONIC KIDNEY DISEASE) STAGE 4, GFR 15-29 ML/MIN (HCC): ICD-10-CM

## 2020-07-03 LAB
ALBUMIN SERPL-MCNC: 4 G/DL (ref 3.4–5)
ANION GAP SERPL CALCULATED.3IONS-SCNC: 12 MMOL/L (ref 3–16)
BUN BLDV-MCNC: 65 MG/DL (ref 7–20)
CALCIUM SERPL-MCNC: 9.2 MG/DL (ref 8.3–10.6)
CHLORIDE BLD-SCNC: 91 MMOL/L (ref 99–110)
CO2: 30 MMOL/L (ref 21–32)
CREAT SERPL-MCNC: 2.1 MG/DL (ref 0.6–1.2)
GFR AFRICAN AMERICAN: 27
GFR NON-AFRICAN AMERICAN: 23
GLUCOSE BLD-MCNC: 142 MG/DL (ref 70–99)
PARATHYROID HORMONE INTACT: 82.8 PG/ML (ref 14–72)
PHOSPHORUS: 2.9 MG/DL (ref 2.5–4.9)
POTASSIUM SERPL-SCNC: 4.1 MMOL/L (ref 3.5–5.1)
SODIUM BLD-SCNC: 133 MMOL/L (ref 136–145)

## 2020-08-04 ENCOUNTER — NURSE ONLY (OUTPATIENT)
Dept: CARDIOLOGY CLINIC | Age: 80
End: 2020-08-04
Payer: MEDICARE

## 2020-08-04 PROCEDURE — 93294 REM INTERROG EVL PM/LDLS PM: CPT | Performed by: INTERNAL MEDICINE

## 2020-08-04 PROCEDURE — 93296 REM INTERROG EVL PM/IDS: CPT | Performed by: INTERNAL MEDICINE

## 2020-08-06 NOTE — PROGRESS NOTES
Ov 10/20. We received remote transmission from patient's monitor at home. Transmission shows normal sensing and pacing function. EP physician will review. See interrogation under cardiology tab in the [address] [place] [address] [address] field for more details. Pacing (% of Time Since 29-Apr-2020)   100.0% (MVP Off)  AP 44.8%  Episodes Since: 29-Apr-2020  1 Non-sustained VT x 6 beats. Follow up in 3 months via carelink.

## 2020-09-03 DIAGNOSIS — G89.29 OTHER CHRONIC PAIN: ICD-10-CM

## 2020-09-03 DIAGNOSIS — I50.9 CONGESTIVE HEART FAILURE, UNSPECIFIED HF CHRONICITY, UNSPECIFIED HEART FAILURE TYPE (HCC): ICD-10-CM

## 2020-09-03 DIAGNOSIS — E83.52 HYPERCALCEMIA: ICD-10-CM

## 2020-09-03 LAB — PARATHYROID HORMONE INTACT: 62.3 PG/ML (ref 14–72)

## 2020-09-04 LAB
ALBUMIN SERPL-MCNC: 4.1 G/DL (ref 3.4–5)
ANION GAP SERPL CALCULATED.3IONS-SCNC: 20 MMOL/L (ref 3–16)
BUN BLDV-MCNC: 43 MG/DL (ref 7–20)
CALCIUM SERPL-MCNC: 11.2 MG/DL (ref 8.3–10.6)
CHLORIDE BLD-SCNC: 96 MMOL/L (ref 99–110)
CO2: 26 MMOL/L (ref 21–32)
CREAT SERPL-MCNC: 2.2 MG/DL (ref 0.6–1.2)
GFR AFRICAN AMERICAN: 26
GFR NON-AFRICAN AMERICAN: 21
GLUCOSE BLD-MCNC: 121 MG/DL (ref 70–99)
PHOSPHORUS: 2.8 MG/DL (ref 2.5–4.9)
POTASSIUM SERPL-SCNC: 3.4 MMOL/L (ref 3.5–5.1)
SODIUM BLD-SCNC: 142 MMOL/L (ref 136–145)

## 2020-10-07 ENCOUNTER — TELEPHONE (OUTPATIENT)
Dept: CARDIOLOGY CLINIC | Age: 80
End: 2020-10-07

## 2020-10-07 NOTE — TELEPHONE ENCOUNTER
Patient  called to report his wife is experiencing some fluid overload in her legs pt needs instructions on what steps to take please advise

## 2020-10-07 NOTE — TELEPHONE ENCOUNTER
Spoke with  and he states that she is having LE edema. No SOB. He stated that he is unable to get her up himself and if the option is to take extra diuretics they cant do that. I tried to explain elevation and low salt diet but he became discouraged and ended the call. Any other option other than diuretics? Rea Vail

## 2020-10-19 NOTE — PROGRESS NOTES
[address]   Electrophysiology  Office Visit  Date: 10/20/2020    Chief Complaint   Patient presents with    Bradycardia    Congestive Heart Failure       Cardiac HX: Bhargav Rosen is a 78 y.o. woman with a h/o HTN, CKD stage III, obesity, chronic dCHF, who p/w fatigue, lightheadedness and LOC, found to have bradycardia and type II AV block, temp PPM placed, BB held and AVB/bradycardia persisted. S/p dual chamber PPM (7/16/19, Dr. Veronica Salgado). Interval History/HPI: Patient is here for follow-up for her chronic type II AV block, and pacemaker management. Review of device today shows that she atrially paces 40% of the time and ventricularly paces 100% of the time. He had one episode of nonsustained VT on October 19 for 14 beats in length. She is dependent on the device today. She has what appears to be 5 episodes of AT/AF however in review of the strips it looks like possibly far field R waves. She  has not felt any heart racing or palpitations. Denies any chest pain, shortness of breath, PND, orthopnea. She has chronic lower extremity edema. She was recently diagnosed with a left lower extremity DVT and is on Eliquis. She does have therapy coming to her home. She is not very mobile and spends most of her time in the wheelchair. She is very tired and irritated today as she has been sitting here for 2 appointments. Remains on her torsemide and is following up with Dr. Stacey Persaud. Review of last labs show that her potassium was 3.4. She is not due to have labs until she follows up with attending in January. Home medications:   Current Outpatient Medications on File Prior to Visit   Medication Sig Dispense Refill    cinacalcet (SENSIPAR) 30 MG tablet TAKE 1 TABLET BY MOUTH 3 TIMES A WEEK 12 tablet 3    traMADol (ULTRAM) 50 MG tablet Take 1 tablet by mouth 2 times daily as needed for Pain for up to 120 days.  60 tablet 3    torsemide (DEMADEX) 20 MG tablet Take 1 tablet by mouth 2 times daily 180 tablet 1    levoFLOXacin (LEVAQUIN) 250 MG tablet       ondansetron (ZOFRAN) 4 MG tablet TK 1 T PO Q 8 H PRN      predniSONE (DELTASONE) 20 MG tablet       isosorbide mononitrate (IMDUR) 30 MG extended release tablet Take 1 tablet by mouth daily 30 tablet 3    triamcinolone (ARISTOCORT) 0.5 % ointment Apply topically 2 times daily Apply topically 2 times daily.  diphenhydrAMINE (BENADRYL) 25 MG tablet Take 25 mg by mouth every 6 hours as needed for Itching      Glucosamine-Chondroit-Vit C-Mn (GLUCOSAMINE 1500 COMPLEX PO) Take 1,500 mg by mouth daily      senna (SENOKOT) 8.6 MG tablet Take 1 tablet by mouth daily      acetaminophen (TYLENOL) 500 MG tablet Take 2,000 mg by mouth daily       mometasone (NASONEX) 50 MCG/ACT nasal spray 2 sprays by Nasal route as needed       PROMETHAZINE-DM PO Take by mouth      diclofenac sodium 1 % GEL Apply 4 g topically daily as needed      phenylephrine-mineral oil-petrolatum (PREPARATION H) 0.25-14-74.9 % rectal ointment Place rectally 2 times daily as needed for Hemorrhoids      Cranberry 425 MG CAPS Take 4,200 mg by mouth       lidocaine (LIDODERM) 5 % Place 1 patch onto the skin daily 12 hours on, 12 hours off. 30 patch 0    chlordiazePOXIDE-clidinium (LIBRAX) 5-2.5 MG per capsule Take 1 capsule by mouth 4 times daily as needed      Fexofenadine HCl (ALLEGRA PO) Take 180 mg by mouth      Omega-3 Fatty Acids (FISH OIL PO) Take 1,200 mg by mouth      magnesium (MAGNESIUM-OXIDE) 250 MG TABS tablet Take 250 mg by mouth daily      Multiple Vitamins-Minerals (MULTIVITAMIN ADULT PO) Take by mouth       No current facility-administered medications on file prior to visit.         Past Medical History:   Diagnosis Date    CHF (congestive heart failure) (HCC)     Hypertension         Past Surgical History:   Procedure Laterality Date    CHOLECYSTECTOMY      HYSTERECTOMY         Allergies   Allergen Reactions    Accupril [Quinapril Hcl] Nausea Only    Anaprox [Naproxen Sodium] Other (See Comments)     \"makes me loopy\"    Biaxin [Clarithromycin] Other (See Comments)     \"extremely sick\"    Buspar [Buspirone] Other (See Comments)     \"go to sleep all over\"    Butrans [Buprenorphine] Swelling    Capoten [Captopril]      BP goes too low    Cardene [Nicardipine] Other (See Comments)     hypotension    Cardizem [Diltiazem Hcl] Other (See Comments)     \"go to sleep all over\"    Ciprofloxacin Other (See Comments)     \"dizzy\"    Codeine Other (See Comments)    Cymbalta [Duloxetine Hcl] Other (See Comments)     \"shake and could not stop\"    Demerol Hcl [Meperidine] Other (See Comments)     \"makes me wild as a deer\"    Doxycycline     Elavil [Amitriptyline Hcl]     Entex Lq [Phenylephrine-Guaifenesin]      Entex LA    Feldene [Piroxicam]     Gabapentin Other (See Comments)     Leg pain    Hctz [Hydrochlorothiazide]      BP goes too low    Isoptin [Verapamil]     Keflex [Cephalexin]     Lorazepam Other (See Comments)     \"Shake can't stop\"    Lyrica [Pregabalin] Other (See Comments)     \"shake and could not stop\"    Meclomen [Meclofenamate]     Morphine Swelling     dizziness    Nabumetone     Nucynta [Tapentadol]      dizzy    Ofloxacin     Peanut Butter Flavor     Percocet [Oxycodone-Acetaminophen] Swelling     Tongue swelling    Premarin [Conjugated Estrogens] Other (See Comments)     \"extremely sick\"    Prinivil [Lisinopril] Swelling    Seldane [Terfenadine]     Strawberry Flavor     Sulfa Antibiotics     Tagamet [Cimetidine]     Temazepam Swelling    Terbinafine And Related     Tetracyclines & Related      Extremely sick    Trazodone And Nefazodone Other (See Comments)     \"shake and could not stop\"    Voltaren [Diclofenac Sodium]     Zanaflex [Tizanidine Hcl]     Amoxicillin Rash    Penicillins Rash       Social History:  Reviewed. reports that she has never smoked.  She has never used smokeless tobacco. She reports that she does not drink alcohol or use drugs. Family History:  Reviewed. family history includes Stroke in her mother. Review of System:    · Constitutional: No fevers, chills. · Eyes: No visual changes or diplopia. No scleral icterus. · ENT: No Headaches. No mouth sores or sore throat. · Cardiovascular: No for chest pain, No for dyspnea on exertion, No for palpitations or No for loss of consciousness. No cough, hemoptysis, No for pleuritic pain, or phlebitis. · Respiratory: No for cough or wheezing. No hematemesis. · Gastrointestinal: No abdominal pain, blood in stools. · Genitourinary: No dysuria, or hematuria. · Musculoskeletal: No gait disturbance,    · Integumentary: No rash or pruritis. · Neurological: No headache, change in muscle strength, numbness or tingling. · Psychiatric: No anxiety, or depression. · Endocrine: No temperature intolerance. No excessive thirst, fluid intake, or urination. · Hem/Lymph: No abnormal bruising or bleeding, blood clots or swollen lymph nodes. · Allergic/Immunologic: No nasal congestion or hives. Physical Examination:  Vitals:    10/20/20 1110   BP: 100/60   Pulse: 85   Temp: 97.5 °F (36.4 °C)         Wt Readings from Last 3 Encounters:   10/20/20 188 lb (85.3 kg)   07/02/20 199 lb (90.3 kg)   06/04/20 201 lb (91.2 kg)       · Constitutional: Oriented. No distress. · Head: Normocephalic and atraumatic. · Mouth/Throat: Oropharynx is clear and moist.   · Eyes: Conjunctivae clear without jaunduice. PERRL. · Neck: Neck supple. No rigidity. No JVD present. · Cardiovascular: Normal rate, regular rhythm, S1&S2. · Pulmonary/Chest: Bilateral respiratory sounds. No wheezes, No rhonchi. · Abdominal: Soft. Bowel sounds present. No distension, No tenderness. · Musculoskeletal: No tenderness. 2-3+ bilat LE edema    · Lymphadenopathy: Has no cervical adenopathy. · Neurological: Alert and oriented.  Cranial nerve appears intact, No Gross deficit   · Skin: Skin is warm and dry. No rash noted. · Psychiatric: Has a normal mood, affect and behavior     Labs:  Reviewed. No results for input(s): NA, K, CL, CO2, PHOS, BUN, CREATININE in the last 72 hours. Invalid input(s): CA,  TSH  No results for input(s): WBC, HGB, HCT, MCV, PLT in the last 72 hours. Lab Results   Component Value Date    TROPONINI 0.15 07/14/2019     Lab Results   Component Value Date     12/06/2017     Lab Results   Component Value Date    PROTIME 11.5 07/13/2019    INR 1.01 07/13/2019     No results found for: CHOL, HDL, TRIG    ECG: Personally reviewed: AV seq pacing, HR 86, , , QTc 45    ECHO: 7/15/2019  Summary   Suboptimal image quality. Definity contrast administered. Left ventricular   cavity size is normal. There is mild concentric left ventricular   hypertrophy. Overall left ventricular systolic function appears normal with   an estimated ejection fraction of 55-60% based off the parasternal images.   Cannot exclude regional wall motion abnormalities secondary to poor   endocardial visualization. Diastolic filling parameters suggests grade II   diastolic dysfunction.   Aortic valve leaflets appear thickened at the annulus. Individual aortic   valve leaflets are not clearly visualized. No evidence of aortic valve   regurgitation. No evidence of aortic valve stenosis.   There is mild tricuspid regurgitation present. Problem List:   Patient Active Problem List    Diagnosis Date Noted    Essential hypertension 10/06/2017     Priority: High    Bilateral lower extremity edema      Priority: High    Pacemaker 07/23/2019    Acute kidney injury superimposed on CKD ([address] 07/16/2019    Syncope and collapse     Bradycardia 07/13/2019    Complete heart block (HCC)     Chronic diastolic congestive heart failure ([address] 12/21/2017        Assessment:   1. CHB (complete heart block) ([address]    2. Bradycardia    3. Essential hypertension    4. Pacemaker    5.  Chronic diastolic heart failure ([facility]         Cardiac HX: Minda Branch is a [de-identified] y.o. woman with a h/o HTN, CKD stage III, obesity, chronic dCHF, who p/w fatigue, lightheadedness and LOC, found to have bradycardia and type II AV block, temp PPM placed, BB held and AVB/bradycardia persisted. S/p dual chamber PPM (7/16/19, Dr. Zohreh Hagen     High degree AV block  - S/p dual chamber MDT PPM with symptomatic improvement  - Device check shows 40.5% AP, 100% , 5 AT/AF episodes that appear to be FFRW, 1 NSVT episode that lasted for 14 beats. Other parameters stable. - MVP turned on      Chronic dCHF  - On torsemide 20 mg QD  - Last Cr 2.2 K+ 3.4- follows with Dr. Catherine block [facility]  - Daily weights  - Lifestyle modification - low Na+ diet, weight loss, exercise  - ECG ordered and results personally reviewed     EF of 55 to 60%  No ACEi for systolic HF d/t low BP  No known CAD  No known AF  No Tobacco use. All questions and concerns were addressed to the patient/family. Alternatives to my treatment were discussed. The note was completed using EMR. Every effort was made to ensure accuracy; however, inadvertent computerized transcription errors may be present. Patient received education regarding their diagnosis, treatment and medications while in the office today.       Courtney Ramsay [address]

## 2020-10-20 ENCOUNTER — OFFICE VISIT (OUTPATIENT)
Dept: CARDIOLOGY CLINIC | Age: 80
End: 2020-10-20
Payer: MEDICARE

## 2020-10-20 ENCOUNTER — NURSE ONLY (OUTPATIENT)
Dept: CARDIOLOGY CLINIC | Age: 80
End: 2020-10-20
Payer: MEDICARE

## 2020-10-20 VITALS
TEMPERATURE: 97.5 F | DIASTOLIC BLOOD PRESSURE: 60 MMHG | BODY MASS INDEX: 34.6 KG/M2 | HEIGHT: 62 IN | WEIGHT: 188 LBS | SYSTOLIC BLOOD PRESSURE: 100 MMHG | HEART RATE: 85 BPM

## 2020-10-20 VITALS — DIASTOLIC BLOOD PRESSURE: 60 MMHG | SYSTOLIC BLOOD PRESSURE: 100 MMHG | HEART RATE: 60 BPM | TEMPERATURE: 97.5 F

## 2020-10-20 PROCEDURE — 1090F PRES/ABSN URINE INCON ASSESS: CPT | Performed by: INTERNAL MEDICINE

## 2020-10-20 PROCEDURE — G8484 FLU IMMUNIZE NO ADMIN: HCPCS | Performed by: INTERNAL MEDICINE

## 2020-10-20 PROCEDURE — 99214 OFFICE O/P EST MOD 30 MIN: CPT | Performed by: NURSE PRACTITIONER

## 2020-10-20 PROCEDURE — G8484 FLU IMMUNIZE NO ADMIN: HCPCS | Performed by: NURSE PRACTITIONER

## 2020-10-20 PROCEDURE — 4040F PNEUMOC VAC/ADMIN/RCVD: CPT | Performed by: INTERNAL MEDICINE

## 2020-10-20 PROCEDURE — G8400 PT W/DXA NO RESULTS DOC: HCPCS | Performed by: INTERNAL MEDICINE

## 2020-10-20 PROCEDURE — 99213 OFFICE O/P EST LOW 20 MIN: CPT | Performed by: INTERNAL MEDICINE

## 2020-10-20 PROCEDURE — G8427 DOCREV CUR MEDS BY ELIG CLIN: HCPCS | Performed by: INTERNAL MEDICINE

## 2020-10-20 PROCEDURE — G8400 PT W/DXA NO RESULTS DOC: HCPCS | Performed by: NURSE PRACTITIONER

## 2020-10-20 PROCEDURE — 1036F TOBACCO NON-USER: CPT | Performed by: INTERNAL MEDICINE

## 2020-10-20 PROCEDURE — G8417 CALC BMI ABV UP PARAM F/U: HCPCS | Performed by: NURSE PRACTITIONER

## 2020-10-20 PROCEDURE — 1123F ACP DISCUSS/DSCN MKR DOCD: CPT | Performed by: NURSE PRACTITIONER

## 2020-10-20 PROCEDURE — G8417 CALC BMI ABV UP PARAM F/U: HCPCS | Performed by: INTERNAL MEDICINE

## 2020-10-20 PROCEDURE — 93000 ELECTROCARDIOGRAM COMPLETE: CPT | Performed by: NURSE PRACTITIONER

## 2020-10-20 PROCEDURE — 4040F PNEUMOC VAC/ADMIN/RCVD: CPT | Performed by: NURSE PRACTITIONER

## 2020-10-20 PROCEDURE — 1123F ACP DISCUSS/DSCN MKR DOCD: CPT | Performed by: INTERNAL MEDICINE

## 2020-10-20 PROCEDURE — G8428 CUR MEDS NOT DOCUMENT: HCPCS | Performed by: NURSE PRACTITIONER

## 2020-10-20 PROCEDURE — 1090F PRES/ABSN URINE INCON ASSESS: CPT | Performed by: NURSE PRACTITIONER

## 2020-10-20 PROCEDURE — 1036F TOBACCO NON-USER: CPT | Performed by: NURSE PRACTITIONER

## 2020-10-20 PROCEDURE — 93280 PM DEVICE PROGR EVAL DUAL: CPT | Performed by: INTERNAL MEDICINE

## 2020-10-20 ASSESSMENT — ENCOUNTER SYMPTOMS
SHORTNESS OF BREATH: 0
CHOKING: 0
CHEST TIGHTNESS: 0
COUGH: 0

## 2020-10-20 NOTE — PROGRESS NOTES
Narrative    Not on file     FH reviewed personally with pt, denies FH cardiac issues      Vitals:    10/20/20 1022   BP: 100/60   Pulse: 60   Temp: 97.5 °F (36.4 °C)       wt 188      Review of Systems   Constitutional: Negative for activity change, appetite change and fatigue. Respiratory: Negative for cough, choking, chest tightness and shortness of breath. Cardiovascular: Negative for chest pain, palpitations and leg swelling. Denies PND or orthopnea. No tachycardia or syncope. Neurological: Negative for dizziness, syncope and light-headedness. Psychiatric/Behavioral: Negative for agitation, behavioral problems and confusion. All other systems reviewed and are negative. Objective:   Physical Exam   Constitutional: She is oriented to person, place, and time. She appears well-developed and well-nourished. No distress. HENT:   Head: Normocephalic and atraumatic. Eyes: Conjunctivae and EOM are normal. Right eye exhibits no discharge. Left eye exhibits no discharge. Neck: Normal range of motion. No JVD present. Cardiovascular: Normal rate, regular rhythm, S1 normal, S2 normal and normal heart sounds. Exam reveals no gallop. No murmur heard. Pulses:       Radial pulses are 2+ on the right side and 2+ on the left side. Pulmonary/Chest: Effort normal and breath sounds normal. No respiratory distress. She has no wheezes. She has no rales. Abdominal: Soft. Bowel sounds are normal. There is no abdominal tenderness. Musculoskeletal: Normal range of motion. General: Edema present. Comments: Tr Bilateral edema   Neurological: She is alert and oriented to person, place, and time. Skin: Skin is warm and dry. Psychiatric: She has a normal mood and affect. Her behavior is normal. Thought content normal.       Assessment:       Diagnosis Orders   1. Chronic diastolic congestive heart failure ([address]     2. CHB (complete heart block) (HCC)     3. Pacemaker     4.  Diastolic dysfunction     5. Essential hypertension             Plan:      CV stable. Improved from DVT. Now back on diuretic. Bp good. Edema is improving. Dosing torsemide by wt. Watching wt/edema. Renal following. bp is good. PPM followed by EP. No changes. Reviewed previous records and testing including echo 7/19. Continue to monitor. Follow up 4 months.

## 2020-10-23 NOTE — PROGRESS NOTES
Patient comes in for programming evaluation for her pacemaker. 11.5 years to JESSICA  All sensing and pacing parameters are within normal range. Presenting AsVp @  bpm  Underlying AsVp dependent @ 40 bpm (appears 2:1)  AP 40.5%   100%  AT/AF burden <0.1%  1 NSVT on 10/19/2020, 03 sec, 104/97 avg bpm   5 episodes of AT/AF. Last on 10/9/20, Longest 1 min 48 sec, fastest 171/85 avg bpm. Possible FFRW. Will have NPFW review today. Turned on MVP per UL and partial plus. Pt currently on Mag-Ox, Torsemide. Patient will see NPFW and DDW today. Will follow up in 3 months in office or remotely.

## 2020-12-15 DIAGNOSIS — I10 ESSENTIAL HYPERTENSION: ICD-10-CM

## 2020-12-15 LAB
ANION GAP SERPL CALCULATED.3IONS-SCNC: 10 MMOL/L (ref 3–16)
BUN BLDV-MCNC: 34 MG/DL (ref 7–20)
CALCIUM SERPL-MCNC: 10.7 MG/DL (ref 8.3–10.6)
CHLORIDE BLD-SCNC: 98 MMOL/L (ref 99–110)
CO2: 33 MMOL/L (ref 21–32)
CREAT SERPL-MCNC: 2.1 MG/DL (ref 0.6–1.2)
GFR AFRICAN AMERICAN: 27
GFR NON-AFRICAN AMERICAN: 23
GLUCOSE BLD-MCNC: 125 MG/DL (ref 70–99)
POTASSIUM SERPL-SCNC: 4 MMOL/L (ref 3.5–5.1)
SODIUM BLD-SCNC: 141 MMOL/L (ref 136–145)

## 2021-01-19 ENCOUNTER — NURSE ONLY (OUTPATIENT)
Dept: CARDIOLOGY CLINIC | Age: 81
End: 2021-01-19
Payer: MEDICARE

## 2021-01-19 DIAGNOSIS — Z95.0 CARDIAC PACEMAKER IN SITU: ICD-10-CM

## 2021-01-19 DIAGNOSIS — I44.2 COMPLETE HEART BLOCK (HCC): ICD-10-CM

## 2021-01-19 DIAGNOSIS — R00.1 BRADYCARDIA: ICD-10-CM

## 2021-01-19 PROCEDURE — 93296 REM INTERROG EVL PM/IDS: CPT | Performed by: INTERNAL MEDICINE

## 2021-01-19 PROCEDURE — 93294 REM INTERROG EVL PM/LDLS PM: CPT | Performed by: INTERNAL MEDICINE

## 2021-01-19 NOTE — PROGRESS NOTES
We received remote transmission from patient's monitor at home. Transmission shows normal sensing and pacing function. EP physician will review. See interrogation under cardiology tab in the [address] [facility] [address] field for more details. No  arrhythmias recorded.  100%. Follow up in 3 months via carelink.

## 2021-03-10 ENCOUNTER — OFFICE VISIT (OUTPATIENT)
Dept: CARDIOLOGY CLINIC | Age: 81
End: 2021-03-10
Payer: MEDICARE

## 2021-03-10 VITALS — HEART RATE: 76 BPM | TEMPERATURE: 97.8 F | SYSTOLIC BLOOD PRESSURE: 124 MMHG | DIASTOLIC BLOOD PRESSURE: 70 MMHG

## 2021-03-10 DIAGNOSIS — I51.89 DIASTOLIC DYSFUNCTION: ICD-10-CM

## 2021-03-10 DIAGNOSIS — E83.52 HYPERCALCEMIA: ICD-10-CM

## 2021-03-10 DIAGNOSIS — Z95.0 PACEMAKER: ICD-10-CM

## 2021-03-10 DIAGNOSIS — I44.2 CHB (COMPLETE HEART BLOCK) (HCC): ICD-10-CM

## 2021-03-10 DIAGNOSIS — I10 ESSENTIAL HYPERTENSION: ICD-10-CM

## 2021-03-10 DIAGNOSIS — I50.32 CHRONIC DIASTOLIC CONGESTIVE HEART FAILURE (HCC): Primary | ICD-10-CM

## 2021-03-10 LAB
ALBUMIN SERPL-MCNC: 4 G/DL (ref 3.4–5)
ANION GAP SERPL CALCULATED.3IONS-SCNC: 11 MMOL/L (ref 3–16)
BUN BLDV-MCNC: 40 MG/DL (ref 7–20)
CALCIUM SERPL-MCNC: 9.7 MG/DL (ref 8.3–10.6)
CHLORIDE BLD-SCNC: 97 MMOL/L (ref 99–110)
CO2: 32 MMOL/L (ref 21–32)
CREAT SERPL-MCNC: 1.8 MG/DL (ref 0.6–1.2)
GFR AFRICAN AMERICAN: 33
GFR NON-AFRICAN AMERICAN: 27
GLUCOSE BLD-MCNC: 126 MG/DL (ref 70–99)
PARATHYROID HORMONE INTACT: 42.2 PG/ML (ref 14–72)
PHOSPHORUS: 3.4 MG/DL (ref 2.5–4.9)
POTASSIUM SERPL-SCNC: 3.3 MMOL/L (ref 3.5–5.1)
SODIUM BLD-SCNC: 140 MMOL/L (ref 136–145)

## 2021-03-10 PROCEDURE — G8427 DOCREV CUR MEDS BY ELIG CLIN: HCPCS | Performed by: INTERNAL MEDICINE

## 2021-03-10 PROCEDURE — G8484 FLU IMMUNIZE NO ADMIN: HCPCS | Performed by: INTERNAL MEDICINE

## 2021-03-10 PROCEDURE — 1090F PRES/ABSN URINE INCON ASSESS: CPT | Performed by: INTERNAL MEDICINE

## 2021-03-10 PROCEDURE — 1123F ACP DISCUSS/DSCN MKR DOCD: CPT | Performed by: INTERNAL MEDICINE

## 2021-03-10 PROCEDURE — 4040F PNEUMOC VAC/ADMIN/RCVD: CPT | Performed by: INTERNAL MEDICINE

## 2021-03-10 PROCEDURE — G8417 CALC BMI ABV UP PARAM F/U: HCPCS | Performed by: INTERNAL MEDICINE

## 2021-03-10 PROCEDURE — 1036F TOBACCO NON-USER: CPT | Performed by: INTERNAL MEDICINE

## 2021-03-10 PROCEDURE — 99214 OFFICE O/P EST MOD 30 MIN: CPT | Performed by: INTERNAL MEDICINE

## 2021-03-10 PROCEDURE — G8400 PT W/DXA NO RESULTS DOC: HCPCS | Performed by: INTERNAL MEDICINE

## 2021-03-10 ASSESSMENT — ENCOUNTER SYMPTOMS
CHEST TIGHTNESS: 0
CHOKING: 0
SHORTNESS OF BREATH: 0
COUGH: 0

## 2021-03-10 NOTE — PROGRESS NOTES
Subjective:      Patient ID: Jose Angel Ritchie is a 78 y.o. female. Congestive Heart Failure  Pertinent negatives include no chest pain, fatigue, palpitations or shortness of breath. Here for follow up CHF/diastolic dysfunction/HTN. L DVT. On AC. No bleeding issues. Edema stable. Back on diuretic. Wt stable. No sob. No pnd. No orthopnea. No chest pain. No tachycardia/syncope. BP good at home.      Past Medical History:   Diagnosis Date    CHF (congestive heart failure) (HCC)     Hypertension      Past Surgical History:   Procedure Laterality Date    CHOLECYSTECTOMY      HYSTERECTOMY       Social History     Socioeconomic History    Marital status:      Spouse name: Not on file    Number of children: Not on file    Years of education: Not on file    Highest education level: Not on file   Occupational History    Not on file   Social Needs    Financial resource strain: Not on file    Food insecurity     Worry: Not on file     Inability: Not on file    Transportation needs     Medical: Not on file     Non-medical: Not on file   Tobacco Use    Smoking status: Never Smoker    Smokeless tobacco: Never Used   Substance and Sexual Activity    Alcohol use: No    Drug use: No    Sexual activity: Not Currently   Lifestyle    Physical activity     Days per week: Not on file     Minutes per session: Not on file    Stress: Not on file   Relationships    Social connections     Talks on phone: Not on file     Gets together: Not on file     Attends [employer] service: Not on file     Active member of club or organization: Not on file     Attends meetings of clubs or organizations: Not on file     Relationship status: Not on file    Intimate partner violence     Fear of current or ex partner: Not on file     Emotionally abused: Not on file     Physically abused: Not on file     Forced sexual activity: Not on file   Other Topics Concern    Not on file   Social History Narrative    Not on file     Shawn Ta reviewed personally with pt, denies FH cardiac issues      Vitals:    03/10/21 1309   BP: 124/70   Pulse: 76   Temp: 97.8 °F (36.6 °C)       wt 186      Review of Systems   Constitutional: Negative for activity change, appetite change and fatigue. Respiratory: Negative for cough, choking, chest tightness and shortness of breath. Cardiovascular: Negative for chest pain, palpitations and leg swelling. Denies PND or orthopnea. No tachycardia or syncope. Neurological: Negative for dizziness, syncope and light-headedness. Psychiatric/Behavioral: Negative for agitation, behavioral problems and confusion. All other systems reviewed and are negative. Objective:   Physical Exam   Constitutional: She is oriented to person, place, and time. She appears well-developed and well-nourished. No distress. HENT:   Head: Normocephalic and atraumatic. Eyes: Conjunctivae and EOM are normal. Right eye exhibits no discharge. Left eye exhibits no discharge. Neck: Normal range of motion. No JVD present. Cardiovascular: Normal rate, regular rhythm, S1 normal, S2 normal and normal heart sounds. Exam reveals no gallop. No murmur heard. Pulses:       Radial pulses are 2+ on the right side and 2+ on the left side. Pulmonary/Chest: Effort normal and breath sounds normal. No respiratory distress. She has no wheezes. She has no rales. Abdominal: Soft. Bowel sounds are normal. There is no abdominal tenderness. Musculoskeletal: Normal range of motion. General: Edema present. Comments: Tr Bilateral edema   Neurological: She is alert and oriented to person, place, and time. Skin: Skin is warm and dry. Psychiatric: She has a normal mood and affect. Her behavior is normal. Thought content normal.       Assessment:       Diagnosis Orders   1. Chronic diastolic congestive heart failure ([address]     2. CHB (complete heart block) (HCC)     3. Pacemaker     4. Diastolic dysfunction     5. Essential hypertension             Plan:      CV stable. Stable from DVT. Continue  on diuretic. Bp good. Edema is stable. Dosing torsemide by wt. Watching wt/edema. Renal following. bp is good. PPM followed by EP. No changes. Reviewed previous records and testing including echo 7/19. Continue to monitor. Follow up 4 months.

## 2021-04-12 NOTE — DISCHARGE SUMMARY
INTERNAL MEDICINE    RESIDENT DISCHARGE SUMMARY   Discharge Summaries      Patient ID: Marek Vo   Gender: female      :  1940  AGE: [de-identified] y.o. MRN:  [id number]  Code Status: Full Code   PCP: Faheem Foster DO    Admit date:  2019      Discharge date:  2019    Admitting Physician: Nellie Harmon DO    Discharge Physician: Terese Landeros MD     Admission Condition:  poor  Discharged Condition:  good Stable    Discharge Diagnoses: Active Hospital Problems    Diagnosis Date Noted    Chronic diastolic heart failure (HCC) [I50.32]      Priority: High    UTI (urinary tract infection) [N39.0] 2019    Acute kidney injury superimposed on CKD ([facility] [N17.9, N18.9] 2019    Syncope and collapse [R55]     Bradycardia [R00.1] 2019    Complete heart block ([facility] [I44.2]        The patient was seen and examined on day of discharge and this discharge summary is in conjunction with any daily progress note from day of discharge. Hospital Course:     Marek Vo is a 77 y/o female with PMHx diastolic CHF (diastolic), CKD 3, and HTN presented to [facility] with 2 day history of fatigue and lightheadedness. Upon arrival to the ED, patient's heart rate was in the 20s. Cardiology evaluated her, found her to have complete heart block, and placed a transvenous pacemaker. Patient had permanent pacemaker placed on . Patient also noted to have UTI on admission. No acute events overnight. Pt's leukocytosis 2/2 UTI resolved on ceftriaxone, discontinued. Says her breathing is much improved from when she was admitted. CXR  still showing some bibasilar atelectasis, but denies shortness of breath, cough, wheezing. Says her shoulder is a little sore from the pacemaker placement, but feels well otherwise. Says she is going home in an ambulance d/t the features of her home.   Denies any fevers, chills, chest pain, palpitations, leg swelling, abdominal pain, nausea, vomiting, diarrhea. Disposition:  Home with Home Health Care    Physical Exam Performed:     /60   Pulse 82   Temp 98.1 °F (36.7 °C) (Oral)   Resp 18   Ht 5' 3\" (1.6 m)   Wt 210 lb 8.6 oz (95.5 kg)   SpO2 96%   BMI 37.30 kg/m²     Physical Exam   Constitutional: She is oriented to person, place, and time. She appears well-developed and well-nourished. No distress. HENT:   Head: Normocephalic and atraumatic. Nose: Nose normal.   Mouth/Throat: Oropharynx is clear and moist. No oropharyngeal exudate. Eyes: Pupils are equal, round, and reactive to light. Conjunctivae and EOM are normal. No scleral icterus. Neck: Normal range of motion. Neck supple. Has large amount of neck tissue   Cardiovascular: Normal rate, regular rhythm, normal heart sounds and intact distal pulses. Exam reveals no gallop and no friction rub. No murmur heard. Pulmonary/Chest: Effort normal and breath sounds normal. No stridor. No respiratory distress. She has no wheezes. Abdominal: Soft. Bowel sounds are normal. She exhibits no distension. There is no tenderness. There is no guarding. Musculoskeletal: Normal range of motion. She exhibits edema. 1+ edema of legs   Neurological: She is alert and oriented to person, place, and time. No cranial nerve deficit or sensory deficit. Coordination normal.   Skin: Skin is dry. Capillary refill takes less than 2 seconds. She is not diaphoretic. No erythema. There is pallor. Skin is cool and pt appears pale; Bump in skin where pacemaker present on L chest   Psychiatric: She has a normal mood and affect. Her behavior is normal.       Labs:  For convenience and continuity at follow-up the following most recent labs are provided:      CBC:    Lab Results   Component Value Date    WBC 9.0 07/18/2019    HGB 11.0 07/18/2019    HCT 32.5 07/18/2019     07/18/2019       Renal:    Lab Results   Component Value Date     07/18/2019    K 4.6 07/18/2019    K 5.2 07/13/2019 CL 98 07/18/2019    CO2 23 07/18/2019    BUN 61 07/18/2019    CREATININE 1.9 07/18/2019    CALCIUM 9.5 07/18/2019    PHOS 3.5 07/18/2019         Significant Diagnostic Studies    Radiology:   XR CHEST PORTABLE   Final Result      Low level of inspiration with bibasilar atelectasis. XR CHEST PORTABLE   Final Result      Mild right basilar atelectasis. Low lung volumes. Borderline cardiomegaly. Left subclavian dual lead pacemaker noted. No visualized pneumothorax. XR CHEST PORTABLE   Final Result      No significant change in hazy bilateral opacities. XR CHEST PORTABLE   Final Result      No focal airspace consolidation. Consults:     IP CONSULT TO CARDIOLOGY  IP CONSULT TO CRITICAL CARE  IP CONSULT TO CRITICAL CARE  IP CONSULT TO CARDIOLOGY      Discharge Instructions/Follow-up:      The [email or web address]    Symptomatic Bradycardia secondary to Complete heart bock   HR 20s/min. Hemodynamically stable but with multiple syncopal episodes at [facility] pacemaker placed 7/16  - ECG (7/14) - ventricular paced rhythm  - Cardiology wants to do a nuclear stress test in the future     UTI  - Ceftriaxone discontinued since UTI resolved     Congestive heart failure   - Started torsemide 40mg qd     HTN  - Can restart imdur, metoprolol, torsemide     CKD  -PROSPER on admission, improved with better perfusion.     Activity: activity as tolerated    Diet: cardiac diet    Discharge Medications:     Current Discharge Medication List           Details   sodium chloride (OCEAN, BABY AYR) 0.65 % nasal spray 1 spray by Nasal route as needed for Congestion  Qty: 1 Bottle, Refills: 0              Details   isosorbide mononitrate (IMDUR) 30 MG extended release tablet Take 1 tablet by mouth daily  Qty: 30 tablet, Refills: 3      torsemide (DEMADEX) 20 MG tablet Take 1 tablet by mouth daily  Qty: 30 tablet, Refills: 5      traMADol no

## 2021-04-20 ENCOUNTER — NURSE ONLY (OUTPATIENT)
Dept: CARDIOLOGY CLINIC | Age: 81
End: 2021-04-20
Payer: MEDICARE

## 2021-04-20 DIAGNOSIS — I44.2 COMPLETE HEART BLOCK (HCC): ICD-10-CM

## 2021-04-20 DIAGNOSIS — R00.1 BRADYCARDIA: ICD-10-CM

## 2021-04-20 DIAGNOSIS — Z95.0 CARDIAC PACEMAKER IN SITU: ICD-10-CM

## 2021-04-20 NOTE — PROGRESS NOTES
We received remote transmission from patient's monitor at home. Transmission shows normal sensing and pacing function. EP physician will review. See interrogation under cardiology tab in the [address] [facility] [place] [address] field for more details. No  arrhythmias recorded. Follow up in 3 months via carelink.

## 2021-04-24 PROCEDURE — 93294 REM INTERROG EVL PM/LDLS PM: CPT | Performed by: INTERNAL MEDICINE

## 2021-04-24 PROCEDURE — 93296 REM INTERROG EVL PM/IDS: CPT | Performed by: INTERNAL MEDICINE

## 2021-05-20 PROBLEM — N18.4 CHRONIC RENAL DISEASE, STAGE 4, SEVERELY DECREASED GLOMERULAR FILTRATION RATE (GFR) BETWEEN 15-29 ML/MIN/1.73 SQUARE METER (HCC): Status: ACTIVE | Noted: 2018-02-26

## 2021-05-20 PROBLEM — E87.5 HYPERKALEMIA: Status: ACTIVE | Noted: 2017-10-29

## 2021-05-20 PROBLEM — Z86.711 HISTORY OF PULMONARY EMBOLISM: Status: ACTIVE | Noted: 2018-02-26

## 2021-06-15 DIAGNOSIS — E83.52 HYPERCALCEMIA: ICD-10-CM

## 2021-06-15 DIAGNOSIS — N18.4 CKD (CHRONIC KIDNEY DISEASE) STAGE 4, GFR 15-29 ML/MIN (HCC): ICD-10-CM

## 2021-06-15 LAB
ALBUMIN SERPL-MCNC: 3.8 G/DL (ref 3.4–5)
ANION GAP SERPL CALCULATED.3IONS-SCNC: 12 MMOL/L (ref 3–16)
BUN BLDV-MCNC: 52 MG/DL (ref 7–20)
CALCIUM SERPL-MCNC: 9.6 MG/DL (ref 8.3–10.6)
CHLORIDE BLD-SCNC: 100 MMOL/L (ref 99–110)
CO2: 29 MMOL/L (ref 21–32)
CREAT SERPL-MCNC: 2.2 MG/DL (ref 0.6–1.2)
GFR AFRICAN AMERICAN: 26
GFR NON-AFRICAN AMERICAN: 21
GLUCOSE BLD-MCNC: 119 MG/DL (ref 70–99)
PARATHYROID HORMONE INTACT: 59.6 PG/ML (ref 14–72)
PHOSPHORUS: 3.8 MG/DL (ref 2.5–4.9)
POTASSIUM SERPL-SCNC: 4.7 MMOL/L (ref 3.5–5.1)
SODIUM BLD-SCNC: 141 MMOL/L (ref 136–145)

## 2021-07-20 ENCOUNTER — NURSE ONLY (OUTPATIENT)
Dept: CARDIOLOGY CLINIC | Age: 81
End: 2021-07-20

## 2021-07-20 DIAGNOSIS — Z95.0 CARDIAC PACEMAKER IN SITU: ICD-10-CM

## 2021-07-20 DIAGNOSIS — I44.2 COMPLETE HEART BLOCK (HCC): ICD-10-CM

## 2021-07-20 PROCEDURE — 93296 REM INTERROG EVL PM/IDS: CPT | Performed by: INTERNAL MEDICINE

## 2021-07-20 PROCEDURE — 93294 REM INTERROG EVL PM/LDLS PM: CPT | Performed by: INTERNAL MEDICINE

## 2021-07-20 NOTE — PROGRESS NOTES
We received remote transmission from patient's dual chamber pacemaker monitor at home. Transmission shows normal sensing and pacing function. No new arrhythmias/events recorded. Ap 50.1%   100% (MVP On)  Echo 07.2019 showed EF of 55-60%. EP physician will review. See interrogation under cardiology tab in the [address] [place] [address] [address] field for more details.

## 2021-07-28 ENCOUNTER — OFFICE VISIT (OUTPATIENT)
Dept: CARDIOLOGY CLINIC | Age: 81
End: 2021-07-28
Payer: MEDICARE

## 2021-07-28 VITALS
WEIGHT: 190 LBS | SYSTOLIC BLOOD PRESSURE: 136 MMHG | DIASTOLIC BLOOD PRESSURE: 84 MMHG | BODY MASS INDEX: 34.96 KG/M2 | HEIGHT: 62 IN | HEART RATE: 62 BPM

## 2021-07-28 DIAGNOSIS — I45.9 HB (HEART BLOCK): ICD-10-CM

## 2021-07-28 DIAGNOSIS — I50.32 CHRONIC DIASTOLIC CONGESTIVE HEART FAILURE (HCC): Primary | ICD-10-CM

## 2021-07-28 DIAGNOSIS — Z95.0 PACEMAKER: ICD-10-CM

## 2021-07-28 DIAGNOSIS — I10 ESSENTIAL HYPERTENSION: ICD-10-CM

## 2021-07-28 DIAGNOSIS — I51.89 DIASTOLIC DYSFUNCTION: ICD-10-CM

## 2021-07-28 PROCEDURE — 99213 OFFICE O/P EST LOW 20 MIN: CPT | Performed by: INTERNAL MEDICINE

## 2021-07-28 PROCEDURE — 1090F PRES/ABSN URINE INCON ASSESS: CPT | Performed by: INTERNAL MEDICINE

## 2021-07-28 PROCEDURE — 1123F ACP DISCUSS/DSCN MKR DOCD: CPT | Performed by: INTERNAL MEDICINE

## 2021-07-28 PROCEDURE — G8400 PT W/DXA NO RESULTS DOC: HCPCS | Performed by: INTERNAL MEDICINE

## 2021-07-28 PROCEDURE — 4040F PNEUMOC VAC/ADMIN/RCVD: CPT | Performed by: INTERNAL MEDICINE

## 2021-07-28 PROCEDURE — G8417 CALC BMI ABV UP PARAM F/U: HCPCS | Performed by: INTERNAL MEDICINE

## 2021-07-28 PROCEDURE — G8427 DOCREV CUR MEDS BY ELIG CLIN: HCPCS | Performed by: INTERNAL MEDICINE

## 2021-07-28 PROCEDURE — 1036F TOBACCO NON-USER: CPT | Performed by: INTERNAL MEDICINE

## 2021-07-28 ASSESSMENT — ENCOUNTER SYMPTOMS
COUGH: 0
CHOKING: 0
CHEST TIGHTNESS: 0
SHORTNESS OF BREATH: 0

## 2021-07-28 NOTE — PROGRESS NOTES
Subjective:      Patient ID: Laly Sandrita is a 78 y.o. female. Congestive Heart Failure  Pertinent negatives include no chest pain, fatigue, palpitations or shortness of breath. Here for follow up CHF/diastolic dysfunction/HTN.   DVT. On AC. No bleeding issues. Edema stable. Back on diuretic. Wt stable. No sob. No pnd. No orthopnea. No chest pain. No tachycardia/syncope. BP good at home. Past Medical History:   Diagnosis Date    CHF (congestive heart failure) (HCC)     Hypertension      Past Surgical History:   Procedure Laterality Date    CHOLECYSTECTOMY      HYSTERECTOMY       Social History     Socioeconomic History    Marital status:      Spouse name: Not on file    Number of children: Not on file    Years of education: Not on file    Highest education level: Not on file   Occupational History    Not on file   Tobacco Use    Smoking status: Never Smoker    Smokeless tobacco: Never Used   Substance and Sexual Activity    Alcohol use: No    Drug use: No    Sexual activity: Not Currently   Other Topics Concern    Not on file   Social History Narrative    Not on file     Social Determinants of Health     Financial Resource Strain:     Difficulty of Paying Living Expenses:    Food Insecurity:     Worried About Running Out of Food in the Last Year:     [address] in the Last Year:    Transportation Needs:     Lack of Transportation (Medical):      Lack of Transportation (Non-Medical):    Physical Activity:     Days of Exercise per Week:     Minutes of Exercise per Session:    Stress:     Feeling of Stress :    Social Connections:     Frequency of Communication with Friends and Family:     Frequency of Social Gatherings with Friends and Family:     Attends Nondenominational Services:     Active Member of Clubs or Organizations:     Attends Club or Organization Meetings:     Marital Status:    Intimate Partner Violence:     Fear of Current or Ex-Partner:     Emotionally Abused:     Physically Abused:     Sexually Abused:      FH reviewed personally with pt, denies FH cardiac issues      Vitals:    07/28/21 1326   BP: 136/84   Pulse: 62       wt 190      Review of Systems   Constitutional: Negative for activity change, appetite change and fatigue. Respiratory: Negative for cough, choking, chest tightness and shortness of breath. Cardiovascular: Negative for chest pain, palpitations and leg swelling. Denies PND or orthopnea. No tachycardia or syncope. Neurological: Negative for dizziness, syncope and light-headedness. Psychiatric/Behavioral: Negative for agitation, behavioral problems and confusion. All other systems reviewed and are negative. Objective:   Physical Exam  Constitutional:       General: She is not in acute distress. Appearance: She is well-developed. HENT:      Head: Normocephalic and atraumatic. Eyes:      General:         Right eye: No discharge. Left eye: No discharge. Conjunctiva/sclera: Conjunctivae normal.   Neck:      Vascular: No JVD. Cardiovascular:      Rate and Rhythm: Normal rate and regular rhythm. Pulses:           Radial pulses are 2+ on the right side and 2+ on the left side. Heart sounds: Normal heart sounds, S1 normal and S2 normal. No murmur heard. No gallop. Pulmonary:      Effort: Pulmonary effort is normal. No respiratory distress. Breath sounds: Normal breath sounds. No wheezing or rales. Abdominal:      General: Bowel sounds are normal.      Palpations: Abdomen is soft. Tenderness: There is no abdominal tenderness. Musculoskeletal:         General: Normal range of motion. Cervical back: Normal range of motion. Comments: Tr Bilateral edema   Skin:     General: Skin is warm and dry. Neurological:      Mental Status: She is alert and oriented to person, place, and time.    Psychiatric:         Behavior: Behavior normal.         Thought Content: Thought content normal.         Assessment:       Diagnosis Orders   1. Chronic diastolic congestive heart failure (HCC)     2. HB (heart block)     3. Pacemaker     4. Diastolic dysfunction     5. Essential hypertension             Plan:      CV stable. DVT stable. Continue  on diuretic. Bp good. Edema is stable. Dosing torsemide by wt. Watching wt/edema. Renal following. bp is good. PPM followed by EP. No changes. Reviewed previous records and testing including echo 7/19. Continue to monitor. Follow up 4 months.

## 2021-10-19 ENCOUNTER — NURSE ONLY (OUTPATIENT)
Dept: CARDIOLOGY CLINIC | Age: 81
End: 2021-10-19
Payer: MEDICARE

## 2021-10-19 DIAGNOSIS — I44.2 COMPLETE HEART BLOCK (HCC): ICD-10-CM

## 2021-10-19 DIAGNOSIS — Z95.0 PACEMAKER: ICD-10-CM

## 2021-10-19 DIAGNOSIS — R00.1 BRADYCARDIA: ICD-10-CM

## 2021-10-19 PROCEDURE — 93294 REM INTERROG EVL PM/LDLS PM: CPT | Performed by: INTERNAL MEDICINE

## 2021-10-19 PROCEDURE — 93296 REM INTERROG EVL PM/IDS: CPT | Performed by: INTERNAL MEDICINE

## 2021-10-19 NOTE — PROGRESS NOTES
[address]   Electrophysiology  Office Visit  Date: 10/26/2021    Chief Complaint   Patient presents with    Bradycardia    Congestive Heart Failure    Hypertension       Cardiac HX: Jj Reynolds is a 78 y.o. woman with a h/o HTN, CKD stage III, obesity, chronic dCHF, DVT (10/2020), on Eliquis, who p/w fatigue, lightheadedness and LOC (7/2019), found to have bradycardia and type II AV block, temp PPM placed, BB held and AVB/bradycardia persisted, s/p dual chamber PPM (7/16/19, Dr. Hugo Davis). Interval History/HPI: Patient is here for follow-up for high degree AV block, SB and PPM management. Review of her device today shows that she atrially paces 47.9% and ventricularly paces 100%. Besides of AT/AF in the past of been far field R waves. There does not appear to be any recorded episodes on her device. She has not felt any heart racing, palpitations or irregular heartbeats. She denies any dizziness, lightheadedness or syncopal events. She has not been active. She does have chronic lower extremity edema. Her  states her edema is actually better than what it has been. She does take torsemide 20 mg daily and an extra dose if her weight is elevated. She does follow-up with Dr. Alex Hernadez. She denies any PND, orthopnea. She has had no chest discomfort. Her blood pressure is well controlled in the office today.     Home medications:   Current Outpatient Medications on File Prior to Visit   Medication Sig Dispense Refill    torsemide (DEMADEX) 20 MG tablet Take 1 tablet by mouth 2 times daily 180 tablet 1    isosorbide mononitrate (IMDUR) 30 MG extended release tablet Take 1 tablet by mouth daily 30 tablet 3    isosorbide mononitrate (IMDUR) 30 MG extended release tablet Take 1 tablet by mouth daily 30 tablet 3    cinacalcet (SENSIPAR) 30 MG tablet Take 1 tablet by mouth daily 30 tablet 3    cinacalcet (SENSIPAR) 30 MG tablet TAKE 1 TABLET BY MOUTH 3 TIMES A WEEK 12 tablet 3    apixaban (ELIQUIS) 5 MG TABS tablet 2.5 mg       levoFLOXacin (LEVAQUIN) 250 MG tablet       ondansetron (ZOFRAN) 4 MG tablet TK 1 T PO Q 8 H PRN      predniSONE (DELTASONE) 20 MG tablet       triamcinolone (ARISTOCORT) 0.5 % ointment Apply topically 2 times daily Apply topically 2 times daily.  diphenhydrAMINE (BENADRYL) 25 MG tablet Take 25 mg by mouth every 6 hours as needed for Itching      Glucosamine-Chondroit-Vit C-Mn (GLUCOSAMINE 1500 COMPLEX PO) Take 1,500 mg by mouth daily      senna (SENOKOT) 8.6 MG tablet Take 1 tablet by mouth daily      acetaminophen (TYLENOL) 500 MG tablet Take 2,000 mg by mouth daily       mometasone (NASONEX) 50 MCG/ACT nasal spray 2 sprays by Nasal route as needed       PROMETHAZINE-DM PO Take by mouth      diclofenac sodium 1 % GEL Apply 4 g topically daily as needed      phenylephrine-mineral oil-petrolatum (PREPARATION H) 0.25-14-74.9 % rectal ointment Place rectally 2 times daily as needed for Hemorrhoids      Cranberry 425 MG CAPS Take 4,200 mg by mouth       lidocaine (LIDODERM) 5 % Place 1 patch onto the skin daily 12 hours on, 12 hours off. 30 patch 0    chlordiazePOXIDE-clidinium (LIBRAX) 5-2.5 MG per capsule Take 1 capsule by mouth 4 times daily as needed      Fexofenadine HCl (ALLEGRA PO) Take 180 mg by mouth      Omega-3 Fatty Acids (FISH OIL PO) Take 1,200 mg by mouth      magnesium (MAGNESIUM-OXIDE) 250 MG TABS tablet Take 250 mg by mouth daily      Multiple Vitamins-Minerals (MULTIVITAMIN ADULT PO) Take by mouth        No current facility-administered medications on file prior to visit.        Past Medical History:   Diagnosis Date    CHF (congestive heart failure) ([facility] [address]     Hypertension         Past Surgical History:   Procedure Laterality Date    CHOLECYSTECTOMY      HYSTERECTOMY         Allergies   Allergen Reactions    Accupril [Quinapril Hcl] Nausea Only    Anaprox [Naproxen Sodium] Other (See Comments)     \"makes me loopy\"    Biaxin [Clarithromycin] Other (See Comments)     \"extremely sick\"    Buspar [Buspirone] Other (See Comments)     \"go to sleep all over\"    Butrans [Buprenorphine] Swelling    Capoten [Captopril]      BP goes too low    Cardene [Nicardipine] Other (See Comments)     hypotension    Cardizem [Diltiazem Hcl] Other (See Comments)     \"go to sleep all over\"    Ciprofloxacin Other (See Comments)     \"dizzy\"    Codeine Other (See Comments)    Cymbalta [Duloxetine Hcl] Other (See Comments)     \"shake and could not stop\"    Demerol Hcl [Meperidine] Other (See Comments)     \"makes me wild as a deer\"    Doxycycline     Elavil [Amitriptyline Hcl]     Entex Lq [Phenylephrine-Guaifenesin]      Entex LA    Feldene [Piroxicam]     Gabapentin Other (See Comments)     Leg pain    Hctz [Hydrochlorothiazide]      BP goes too low    Isoptin [Verapamil]     Keflex [Cephalexin]     Lorazepam Other (See Comments)     \"Shake can't stop\"    Lyrica [Pregabalin] Other (See Comments)     \"shake and could not stop\"    Meclomen [Meclofenamate]     Morphine Swelling     dizziness    Nabumetone     Nucynta [Tapentadol]      dizzy    Ofloxacin     Peanut Butter Flavor     Percocet [Oxycodone-Acetaminophen] Swelling     Tongue swelling    Premarin [Conjugated Estrogens] Other (See Comments)     \"extremely sick\"    Prinivil [Lisinopril] Swelling    Seldane [Terfenadine]     Strawberry Flavor     Sulfa Antibiotics     Tagamet [Cimetidine]     Temazepam Swelling    Terbinafine And Related     Tetracyclines & Related      Extremely sick    Trazodone And Nefazodone Other (See Comments)     \"shake and could not stop\"    Voltaren [Diclofenac Sodium]     Zanaflex [Tizanidine Hcl]     Amoxicillin Rash    Penicillins Rash       Social History:  Reviewed. reports that she has never smoked. She has never used smokeless tobacco. She reports that she does not drink alcohol and does not use drugs. Family History:  Reviewed. family history includes Stroke in her mother. Review of System:    · Constitutional: No fevers, chills. · Eyes: No visual changes or diplopia. No scleral icterus. · ENT: No Headaches. No mouth sores or sore throat. · Cardiovascular: No for chest pain, No for dyspnea on exertion, No for palpitations or No for loss of consciousness. No cough, hemoptysis, No for pleuritic pain, or phlebitis. · Respiratory: No for cough or wheezing. No hematemesis. · Gastrointestinal: No abdominal pain, blood in stools. · Genitourinary: No dysuria, or hematuria. · Musculoskeletal: No gait disturbance,    · Integumentary: No rash or pruritis. · Neurological: No headache, change in muscle strength, numbness or tingling. · Psychiatric: No anxiety, or depression. · Endocrine: No temperature intolerance. No excessive thirst, fluid intake, or urination. · Hem/Lymph: No abnormal bruising or bleeding, blood clots or swollen lymph nodes. · Allergic/Immunologic: No nasal congestion or hives. Physical Examination:  Vitals:    10/26/21 1056   BP: 120/72   Pulse: 81         Wt Readings from Last 3 Encounters:   10/26/21 190 lb (86.2 kg)   07/28/21 190 lb (86.2 kg)   07/16/21 188 lb (85.3 kg)       · Constitutional: Oriented. No distress. · Head: Normocephalic and atraumatic. · Mouth/Throat: Oropharynx is clear and moist.   · Eyes: Conjunctivae clear without jaunduice. PERRL. · Neck: Neck supple. No rigidity. No JVD present. · Cardiovascular: Normal rate, regular rhythm, S1&S2. · Pulmonary/Chest: Bilateral respiratory sounds. No wheezes, No rhonchi. · Abdominal: Soft. Bowel sounds present. No distension, No tenderness. · Musculoskeletal: No tenderness. 2-3+ bilat LE edema    · Lymphadenopathy: Has no cervical adenopathy. · Neurological: Alert and oriented. Cranial nerve appears intact, No Gross deficit   · Skin: Skin is warm and dry. No rash noted.    · Psychiatric: Has a normal mood, affect and behavior Labs:  Reviewed. No results for input(s): NA, K, CL, CO2, PHOS, BUN, CREATININE in the last 72 hours. Invalid input(s): CA,  TSH  No results for input(s): WBC, HGB, HCT, MCV, PLT in the last 72 hours. Lab Results   Component Value Date    TROPONINI 0.15 07/14/2019     Lab Results   Component Value Date     12/06/2017     Lab Results   Component Value Date    PROTIME 11.5 07/13/2019    INR 1.01 07/13/2019     No results found for: CHOL, HDL, TRIG    ECG: Personally reviewed: AS, , HR 81, , QTc 526    ECHO: 7/15/2019  Summary   Suboptimal image quality. Definity contrast administered. Left ventricular   cavity size is normal. There is mild concentric left ventricular   hypertrophy. Overall left ventricular systolic function appears normal with   an estimated ejection fraction of 55-60% based off the parasternal images.   Cannot exclude regional wall motion abnormalities secondary to poor   endocardial visualization. Diastolic filling parameters suggests grade II   diastolic dysfunction.   Aortic valve leaflets appear thickened at the annulus. Individual aortic   valve leaflets are not clearly visualized. No evidence of aortic valve   regurgitation. No evidence of aortic valve stenosis.   There is mild tricuspid regurgitation present. Problem List:   Patient Active Problem List    Diagnosis Date Noted    Essential hypertension 10/06/2017    Bilateral lower extremity edema     Pacemaker 07/23/2019    Acute kidney injury superimposed on CKD ([address] 07/16/2019    Syncope and collapse     Bradycardia 07/13/2019    Complete heart block ([address]     History of pulmonary embolism 02/26/2018    Chronic renal disease, stage 4, severely decreased glomerular filtration rate (GFR) between 15-29 mL/min/1.73 square meter ([address] 02/26/2018    Chronic diastolic congestive heart failure ([address] 12/21/2017    Hyperkalemia 10/29/2017        Assessment:   1. High degree atrioventricular block    2. Bradycardia    3. Pacemaker    4. Chronic diastolic CHF (congestive heart failure) ([facility] [address]    5. Benign essential HTN         Cardiac HX: Gaby Ramsay is a 78 y.o. woman with a h/o HTN, CKD stage III, obesity, chronic dCHF, DVT (10/2020), on Eliquis, who p/w fatigue, lightheadedness and LOC (7/2019), found to have bradycardia and type II AV block, temp PPM placed, BB held and AVB/bradycardia persisted, s/p dual chamber PPM (7/16/19, Dr. Candida Gonzalez).      High degree AV block  - S/p dual chamber MDT PPM with symptomatic improvement  - Device check shows 47.9% AP, 100% , no arrhythmias, Other parameters stable. - MVP turned on   - Follow-up in 6 months  - ECG ordered and results personally reviewed     Chronic dCHF  - On torsemide 20 mg QD along with 20 mg as needed  - Last Cr 2.2, K+ 4.7 - follows with Dr. Christianne Martinez  - Reviewed recent labs  - Daily weights - weight consistent at home  - Lifestyle modification - low Na+ diet, weight loss, exercise     HTN  - BP well controlled  - On Imdur 30 mg     EF of 55 to 60%  No ACEi for systolic HF d/t low BP  No known CAD  No known AF  No Tobacco use. All questions and concerns were addressed to the patient/family. Alternatives to my treatment were discussed. The note was completed using EMR. Every effort was made to ensure accuracy; however, inadvertent computerized transcription errors may be present. Patient received education regarding their diagnosis, treatment and medications while in the office today.       Lety [address]

## 2021-10-20 NOTE — PROGRESS NOTES
We received remote transmission from patient's dual chamber pacemaker monitor at home. Transmission shows normal sensing and pacing function. No new arrhythmias/events recorded. EP physician will review. See interrogation under cardiology tab in the [address] [place] [address] [address] field for more details. Will continue to monitor remotely.

## 2021-10-26 ENCOUNTER — NURSE ONLY (OUTPATIENT)
Dept: CARDIOLOGY CLINIC | Age: 81
End: 2021-10-26
Payer: MEDICARE

## 2021-10-26 ENCOUNTER — OFFICE VISIT (OUTPATIENT)
Dept: CARDIOLOGY CLINIC | Age: 81
End: 2021-10-26
Payer: MEDICARE

## 2021-10-26 VITALS
SYSTOLIC BLOOD PRESSURE: 120 MMHG | HEART RATE: 81 BPM | HEIGHT: 62 IN | DIASTOLIC BLOOD PRESSURE: 72 MMHG | WEIGHT: 190 LBS | BODY MASS INDEX: 34.96 KG/M2

## 2021-10-26 DIAGNOSIS — R00.1 BRADYCARDIA: ICD-10-CM

## 2021-10-26 DIAGNOSIS — I10 BENIGN ESSENTIAL HTN: ICD-10-CM

## 2021-10-26 DIAGNOSIS — Z95.0 PACEMAKER: ICD-10-CM

## 2021-10-26 DIAGNOSIS — R55 SYNCOPE AND COLLAPSE: ICD-10-CM

## 2021-10-26 DIAGNOSIS — I50.32 CHRONIC DIASTOLIC CHF (CONGESTIVE HEART FAILURE) (HCC): ICD-10-CM

## 2021-10-26 DIAGNOSIS — Z95.0 CARDIAC PACEMAKER IN SITU: ICD-10-CM

## 2021-10-26 DIAGNOSIS — I44.2 COMPLETE HEART BLOCK (HCC): ICD-10-CM

## 2021-10-26 DIAGNOSIS — I50.32 CHRONIC DIASTOLIC CONGESTIVE HEART FAILURE (HCC): ICD-10-CM

## 2021-10-26 DIAGNOSIS — I44.39 HIGH DEGREE ATRIOVENTRICULAR BLOCK: Primary | ICD-10-CM

## 2021-10-26 DIAGNOSIS — N18.4 CKD (CHRONIC KIDNEY DISEASE) STAGE 4, GFR 15-29 ML/MIN (HCC): ICD-10-CM

## 2021-10-26 LAB
ALBUMIN SERPL-MCNC: 4.3 G/DL (ref 3.4–5)
ANION GAP SERPL CALCULATED.3IONS-SCNC: 17 MMOL/L (ref 3–16)
BUN BLDV-MCNC: 52 MG/DL (ref 7–20)
CALCIUM SERPL-MCNC: 9.9 MG/DL (ref 8.3–10.6)
CHLORIDE BLD-SCNC: 96 MMOL/L (ref 99–110)
CO2: 27 MMOL/L (ref 21–32)
CREAT SERPL-MCNC: 2.2 MG/DL (ref 0.6–1.2)
GFR AFRICAN AMERICAN: 26
GFR NON-AFRICAN AMERICAN: 21
GLUCOSE BLD-MCNC: 139 MG/DL (ref 70–99)
PARATHYROID HORMONE INTACT: 59.5 PG/ML (ref 14–72)
PHOSPHORUS: 3.8 MG/DL (ref 2.5–4.9)
POTASSIUM SERPL-SCNC: 3.9 MMOL/L (ref 3.5–5.1)
SODIUM BLD-SCNC: 140 MMOL/L (ref 136–145)

## 2021-10-26 PROCEDURE — 93280 PM DEVICE PROGR EVAL DUAL: CPT | Performed by: INTERNAL MEDICINE

## 2021-10-26 PROCEDURE — 1036F TOBACCO NON-USER: CPT | Performed by: NURSE PRACTITIONER

## 2021-10-26 PROCEDURE — 4040F PNEUMOC VAC/ADMIN/RCVD: CPT | Performed by: NURSE PRACTITIONER

## 2021-10-26 PROCEDURE — G8417 CALC BMI ABV UP PARAM F/U: HCPCS | Performed by: NURSE PRACTITIONER

## 2021-10-26 PROCEDURE — G8484 FLU IMMUNIZE NO ADMIN: HCPCS | Performed by: NURSE PRACTITIONER

## 2021-10-26 PROCEDURE — G8427 DOCREV CUR MEDS BY ELIG CLIN: HCPCS | Performed by: NURSE PRACTITIONER

## 2021-10-26 PROCEDURE — 1090F PRES/ABSN URINE INCON ASSESS: CPT | Performed by: NURSE PRACTITIONER

## 2021-10-26 PROCEDURE — 99214 OFFICE O/P EST MOD 30 MIN: CPT | Performed by: NURSE PRACTITIONER

## 2021-10-26 PROCEDURE — 1123F ACP DISCUSS/DSCN MKR DOCD: CPT | Performed by: NURSE PRACTITIONER

## 2021-10-26 PROCEDURE — G8400 PT W/DXA NO RESULTS DOC: HCPCS | Performed by: NURSE PRACTITIONER

## 2021-10-26 NOTE — PROGRESS NOTES
Patient comes in for programming evaluation on their Medtronic dual chamber pacemaker. NPFW present during interrogation. All sensing and pacing parameters are within normal range. Battery life 10.1 years. AP 47.9%.  100%. Underlying V dependent. No episodes noted since 10/20/2020. Patient remains on torsemide, eliquis. Changed atrial sensitivity from 0.60 mV to 0.90 mV. Please see interrogation for more detail. Patient will see NPFW today and follow up in 3 months in office or remotely.

## 2021-12-31 DIAGNOSIS — G89.29 OTHER CHRONIC PAIN: Primary | ICD-10-CM

## 2021-12-31 RX ORDER — TRAMADOL HYDROCHLORIDE 50 MG/1
50 TABLET ORAL 2 TIMES DAILY PRN
Qty: 60 TABLET | Refills: 2 | Status: SHIPPED | OUTPATIENT
Start: 2021-12-31 | End: 2022-01-10 | Stop reason: SDUPTHER

## 2022-01-05 ENCOUNTER — OFFICE VISIT (OUTPATIENT)
Dept: CARDIOLOGY CLINIC | Age: 82
End: 2022-01-05
Payer: MEDICARE

## 2022-01-05 VITALS
DIASTOLIC BLOOD PRESSURE: 70 MMHG | OXYGEN SATURATION: 98 % | BODY MASS INDEX: 34.75 KG/M2 | HEART RATE: 72 BPM | SYSTOLIC BLOOD PRESSURE: 110 MMHG | WEIGHT: 190 LBS

## 2022-01-05 DIAGNOSIS — I51.89 DIASTOLIC DYSFUNCTION: ICD-10-CM

## 2022-01-05 DIAGNOSIS — N18.4 CKD (CHRONIC KIDNEY DISEASE) STAGE 4, GFR 15-29 ML/MIN (HCC): ICD-10-CM

## 2022-01-05 DIAGNOSIS — Z95.0 PACEMAKER: ICD-10-CM

## 2022-01-05 DIAGNOSIS — I10 ESSENTIAL HYPERTENSION: ICD-10-CM

## 2022-01-05 DIAGNOSIS — I45.9 HB (HEART BLOCK): ICD-10-CM

## 2022-01-05 DIAGNOSIS — I50.32 CHRONIC DIASTOLIC CONGESTIVE HEART FAILURE (HCC): Primary | ICD-10-CM

## 2022-01-05 PROCEDURE — 99214 OFFICE O/P EST MOD 30 MIN: CPT | Performed by: INTERNAL MEDICINE

## 2022-01-05 PROCEDURE — G8417 CALC BMI ABV UP PARAM F/U: HCPCS | Performed by: INTERNAL MEDICINE

## 2022-01-05 PROCEDURE — 1090F PRES/ABSN URINE INCON ASSESS: CPT | Performed by: INTERNAL MEDICINE

## 2022-01-05 PROCEDURE — G8427 DOCREV CUR MEDS BY ELIG CLIN: HCPCS | Performed by: INTERNAL MEDICINE

## 2022-01-05 PROCEDURE — 1123F ACP DISCUSS/DSCN MKR DOCD: CPT | Performed by: INTERNAL MEDICINE

## 2022-01-05 PROCEDURE — G8400 PT W/DXA NO RESULTS DOC: HCPCS | Performed by: INTERNAL MEDICINE

## 2022-01-05 PROCEDURE — G8484 FLU IMMUNIZE NO ADMIN: HCPCS | Performed by: INTERNAL MEDICINE

## 2022-01-05 PROCEDURE — 1036F TOBACCO NON-USER: CPT | Performed by: INTERNAL MEDICINE

## 2022-01-05 PROCEDURE — 4040F PNEUMOC VAC/ADMIN/RCVD: CPT | Performed by: INTERNAL MEDICINE

## 2022-01-05 ASSESSMENT — ENCOUNTER SYMPTOMS
CHOKING: 0
SHORTNESS OF BREATH: 0
CHEST TIGHTNESS: 0
COUGH: 0

## 2022-01-05 NOTE — PROGRESS NOTES
Subjective:      Patient ID: Tere Mercedes is a 78 y.o. female. Congestive Heart Failure  Pertinent negatives include no chest pain, fatigue, palpitations or shortness of breath. Here for follow up CHF/diastolic dysfunction/HTN.   DVT. On AC. No bleeding issues. Edema stable. Wt stable. No sob. No pnd. No orthopnea. No chest pain. No tachycardia/syncope. BP good at home. Past Medical History:   Diagnosis Date    CHF (congestive heart failure) (HCC)     Hypertension      Past Surgical History:   Procedure Laterality Date    CHOLECYSTECTOMY      HYSTERECTOMY       Social History     Socioeconomic History    Marital status:      Spouse name: Not on file    Number of children: Not on file    Years of education: Not on file    Highest education level: Not on file   Occupational History    Not on file   Tobacco Use    Smoking status: Never Smoker    Smokeless tobacco: Never Used   Substance and Sexual Activity    Alcohol use: No    Drug use: No    Sexual activity: Not Currently   Other Topics Concern    Not on file   Social History Narrative    Not on file     Social Determinants of Health     Financial Resource Strain:     Difficulty of Paying Living Expenses: Not on file   Food Insecurity:     Worried About [address] [employer] in the Last Year: Not on file    Lynn of Food in the Last Year: Not on file   Transportation Needs:     Lack of Transportation (Medical): Not on file    Lack of Transportation (Non-Medical):  Not on file   Physical Activity:     Days of Exercise per Week: Not on file    Minutes of Exercise per Session: Not on file   Stress:     Feeling of Stress : Not on file   Social Connections:     Frequency of Communication with Friends and Family: Not on file    Frequency of Social Gatherings with Friends and Family: Not on file    Attends Restorationism Services: Not on file    Active Member of Clubs or Organizations: Not on file    Attends Club or Organization Meetings: Not on file    Marital Status: Not on file   Intimate Partner Violence:     Fear of Current or Ex-Partner: Not on file    Emotionally Abused: Not on file    Physically Abused: Not on file    Sexually Abused: Not on file   Housing Stability:     Unable to Pay for Housing in the Last Year: Not on file    Number of Ruben in the Last Year: Not on file    Unstable Housing in the Last Year: Not on file     Shawn Ta reviewed personally with pt, denies FH cardiac issues        Vitals:    01/05/22 1457   BP: 110/70   Pulse: 72   SpO2: 98%       wt 190      Review of Systems   Constitutional: Negative for activity change, appetite change and fatigue. Respiratory: Negative for cough, choking, chest tightness and shortness of breath. Cardiovascular: Negative for chest pain, palpitations and leg swelling. Denies PND or orthopnea. No tachycardia or syncope. Neurological: Negative for dizziness, syncope and light-headedness. Psychiatric/Behavioral: Negative for agitation, behavioral problems and confusion. All other systems reviewed and are negative. Objective:   Physical Exam  Constitutional:       General: She is not in acute distress. Appearance: She is well-developed. HENT:      Head: Normocephalic and atraumatic. Eyes:      General:         Right eye: No discharge. Left eye: No discharge. Conjunctiva/sclera: Conjunctivae normal.   Neck:      Vascular: No JVD. Cardiovascular:      Rate and Rhythm: Normal rate and regular rhythm. Pulses:           Radial pulses are 2+ on the right side and 2+ on the left side. Heart sounds: Normal heart sounds, S1 normal and S2 normal. No murmur heard. No gallop. Pulmonary:      Effort: Pulmonary effort is normal. No respiratory distress. Breath sounds: Normal breath sounds. No wheezing or rales. Abdominal:      General: Bowel sounds are normal.      Palpations: Abdomen is soft.       Tenderness: There is no abdominal tenderness. Musculoskeletal:         General: Normal range of motion. Cervical back: Normal range of motion. Comments: Tr Bilateral edema   Skin:     General: Skin is warm and dry. Neurological:      Mental Status: She is alert and oriented to person, place, and time. Psychiatric:         Behavior: Behavior normal.         Thought Content: Thought content normal.         Assessment:       Diagnosis Orders   1. Chronic diastolic congestive heart failure (HCC)     2. HB (heart block)     3. Pacemaker     4. Diastolic dysfunction     5. Essential hypertension             Plan:      CV stable. DVT stable. Continues on eliquis. Continue torsemide. Bp good. Edema is stable. Dosing torsemide by wt. Watching wt/edema. Renal following. bp is good. PPM followed by EP. No changes. Reviewed previous records and testing including echo 7/19. Continue to monitor. Follow up 6 months.

## 2022-01-06 LAB
ALBUMIN SERPL-MCNC: 4.1 G/DL (ref 3.4–5)
ANION GAP SERPL CALCULATED.3IONS-SCNC: 15 MMOL/L (ref 3–16)
BASOPHILS ABSOLUTE: 0.1 K/UL (ref 0–0.2)
BASOPHILS RELATIVE PERCENT: 0.9 %
BUN BLDV-MCNC: 45 MG/DL (ref 7–20)
CALCIUM SERPL-MCNC: 9.1 MG/DL (ref 8.3–10.6)
CHLORIDE BLD-SCNC: 97 MMOL/L (ref 99–110)
CO2: 29 MMOL/L (ref 21–32)
CREAT SERPL-MCNC: 2.5 MG/DL (ref 0.6–1.2)
EOSINOPHILS ABSOLUTE: 0.2 K/UL (ref 0–0.6)
EOSINOPHILS RELATIVE PERCENT: 2.7 %
GFR AFRICAN AMERICAN: 22
GFR NON-AFRICAN AMERICAN: 18
GLUCOSE BLD-MCNC: 125 MG/DL (ref 70–99)
HCT VFR BLD CALC: 39.4 % (ref 36–48)
HEMOGLOBIN: 13.4 G/DL (ref 12–16)
LYMPHOCYTES ABSOLUTE: 1.9 K/UL (ref 1–5.1)
LYMPHOCYTES RELATIVE PERCENT: 20.9 %
MCH RBC QN AUTO: 30.7 PG (ref 26–34)
MCHC RBC AUTO-ENTMCNC: 34 G/DL (ref 31–36)
MCV RBC AUTO: 90.2 FL (ref 80–100)
MONOCYTES ABSOLUTE: 0.7 K/UL (ref 0–1.3)
MONOCYTES RELATIVE PERCENT: 7.2 %
NEUTROPHILS ABSOLUTE: 6.4 K/UL (ref 1.7–7.7)
NEUTROPHILS RELATIVE PERCENT: 68.3 %
PARATHYROID HORMONE INTACT: 45.2 PG/ML (ref 14–72)
PDW BLD-RTO: 12.8 % (ref 12.4–15.4)
PHOSPHORUS: 4.4 MG/DL (ref 2.5–4.9)
PLATELET # BLD: 248 K/UL (ref 135–450)
PMV BLD AUTO: 8.8 FL (ref 5–10.5)
POTASSIUM SERPL-SCNC: 3.3 MMOL/L (ref 3.5–5.1)
RBC # BLD: 4.37 M/UL (ref 4–5.2)
SODIUM BLD-SCNC: 141 MMOL/L (ref 136–145)
WBC # BLD: 9.3 K/UL (ref 4–11)

## 2022-01-17 PROCEDURE — 93294 REM INTERROG EVL PM/LDLS PM: CPT | Performed by: INTERNAL MEDICINE

## 2022-01-17 PROCEDURE — 93296 REM INTERROG EVL PM/IDS: CPT | Performed by: INTERNAL MEDICINE

## 2022-01-18 ENCOUNTER — NURSE ONLY (OUTPATIENT)
Dept: CARDIOLOGY CLINIC | Age: 82
End: 2022-01-18
Payer: MEDICARE

## 2022-01-18 DIAGNOSIS — I44.2 COMPLETE HEART BLOCK (HCC): ICD-10-CM

## 2022-01-18 DIAGNOSIS — Z95.0 PACEMAKER: ICD-10-CM

## 2022-01-18 DIAGNOSIS — R00.1 BRADYCARDIA: ICD-10-CM

## 2022-01-21 NOTE — PROGRESS NOTES
We received remote transmission from patient's dual chamber pacemaker monitor at home. Transmission shows normal sensing and pacing function. No new arrhythmias/events recorded. Ap 31.8%   100% (MVP On)  Echo 07.2019 showed EF of 55-60%. EP physician will review. See interrogation under cardiology tab in the [address] [place] [address] [address] field for more details. Will continue to monitor remotely.

## 2022-04-05 NOTE — PROGRESS NOTES
[address]   Electrophysiology  Office Visit  Date: 4/27/2022    Chief Complaint   Patient presents with    Bradycardia    Congestive Heart Failure    Hypertension       Cardiac HX: Lopez Walker is a 78 y.o. woman with a h/o HTN, CKD stage III, obesity, chronic dCHF, DVT (10/2020), on Eliquis, who p/w fatigue, lightheadedness and LOC (7/2019), found to have bradycardia and type II AV block, temp PPM placed, BB held and AVB/bradycardia persisted, s/p dual chamber MDT PPM (7/16/19, Dr. Casper Harrington). Interval History/HPI: Patient is here to follow-up for high degree AV block and PPM management. Patient was originally seen in 2019 when she presented with dizziness and lightheadedness. She was found to have sinus bradycardia and type II AV block for which she was symptomatic. She had had a temporary pacemaker placed along with her beta-blocker being placed on hold. She had persistent AV block and bradycardia and underwent a dual-chamber permanent pacemaker on 7/16/2019. Review of her device today shows that she atrially paces 38.4% of the time and ventricularly paces 100% of the time. She is dependent on her device at VVI 30. We discussed doing an echo today however she is not interested in further testing. She does not feel like she is any more short of breath. She does have chronic lower extremity edema with the left being greater than the right. She does have the left lower extremity wrapped. She takes torsemide 20 mg daily and an extra 20 if her weight is greater than 192 pounds. She follows with Dr. Diana Murcia for her chronic kidney disease. She does have shortness of breath with exertion however she is not that active and is in the wheelchair most of the time. She denies chest discomfort, PND, orthopnea. Blood pressure is well controlled in the office today.     Home medications:   Current Outpatient Medications on File Prior to Visit   Medication Sig Dispense Refill    traMADol Everrett Mouse) 50 MG tablet Take 1 tablet by mouth 2 times daily as needed for Pain for up to 90 days. 60 tablet 2    isosorbide mononitrate (IMDUR) 30 MG extended release tablet Take 1 tablet by mouth daily 30 tablet 3    cinacalcet (SENSIPAR) 30 MG tablet Take 1 tablet by mouth daily 30 tablet 3    torsemide (DEMADEX) 20 MG tablet Take 1 tablet by mouth 2 times daily 180 tablet 1    cinacalcet (SENSIPAR) 30 MG tablet TAKE 1 TABLET BY MOUTH 3 TIMES A WEEK 12 tablet 3    apixaban (ELIQUIS) 5 MG TABS tablet 2.5 mg       levoFLOXacin (LEVAQUIN) 250 MG tablet       ondansetron (ZOFRAN) 4 MG tablet TK 1 T PO Q 8 H PRN      triamcinolone (ARISTOCORT) 0.5 % ointment Apply topically 2 times daily Apply topically 2 times daily.  diphenhydrAMINE (BENADRYL) 25 MG tablet Take 25 mg by mouth every 6 hours as needed for Itching      Glucosamine-Chondroit-Vit C-Mn (GLUCOSAMINE 1500 COMPLEX PO) Take 1,500 mg by mouth daily       senna (SENOKOT) 8.6 MG tablet Take 1 tablet by mouth daily      acetaminophen (TYLENOL) 500 MG tablet Take 2,000 mg by mouth daily       mometasone (NASONEX) 50 MCG/ACT nasal spray 2 sprays by Nasal route as needed       PROMETHAZINE-DM PO Take by mouth      diclofenac sodium 1 % GEL Apply 4 g topically daily as needed      phenylephrine-mineral oil-petrolatum (PREPARATION H) 0.25-14-74.9 % rectal ointment Place rectally 2 times daily as needed for Hemorrhoids      Cranberry 425 MG CAPS Take 4,200 mg by mouth       lidocaine (LIDODERM) 5 % Place 1 patch onto the skin daily 12 hours on, 12 hours off.  30 patch 0    chlordiazePOXIDE-clidinium (LIBRAX) 5-2.5 MG per capsule Take 1 capsule by mouth 4 times daily as needed      Fexofenadine HCl (ALLEGRA PO) Take 180 mg by mouth      Omega-3 Fatty Acids (FISH OIL PO) Take 1,200 mg by mouth      magnesium (MAGNESIUM-OXIDE) 250 MG TABS tablet Take 250 mg by mouth daily      Multiple Vitamins-Minerals (MULTIVITAMIN ADULT PO) Take by mouth        No current facility-administered medications on file prior to visit.        Past Medical History:   Diagnosis Date    CHF (congestive heart failure) (HCC)     Hypertension         Past Surgical History:   Procedure Laterality Date    CHOLECYSTECTOMY      HYSTERECTOMY         Allergies   Allergen Reactions    Accupril [Quinapril Hcl] Nausea Only    Anaprox [Naproxen Sodium] Other (See Comments)     \"makes me loopy\"    Biaxin [Clarithromycin] Other (See Comments)     \"extremely sick\"    Buspar [Buspirone] Other (See Comments)     \"go to sleep all over\"    Butrans [Buprenorphine] Swelling    Capoten [Captopril]      BP goes too low    Cardene [Nicardipine] Other (See Comments)     hypotension    Cardizem [Diltiazem Hcl] Other (See Comments)     \"go to sleep all over\"    Ciprofloxacin Other (See Comments)     \"dizzy\"    Codeine Other (See Comments)    Cymbalta [Duloxetine Hcl] Other (See Comments)     \"shake and could not stop\"    Demerol Hcl [Meperidine] Other (See Comments)     \"makes me wild as a deer\"    Doxycycline     Elavil [Amitriptyline Hcl]     Entex Lq [Phenylephrine-Guaifenesin]      Entex LA    Feldene [Piroxicam]     Fentanyl     Gabapentin Other (See Comments)     Leg pain    Hctz [Hydrochlorothiazide]      BP goes too low    Isoptin [Verapamil]     Keflex [Cephalexin]     Lorazepam Other (See Comments)     \"Shake can't stop\"    Lyrica [Pregabalin] Other (See Comments)     \"shake and could not stop\"    Meclomen [Meclofenamate]     Morphine Swelling     dizziness    Nabumetone     Nucynta [Tapentadol]      dizzy    Ofloxacin     Peanut Butter Flavor     Percocet [Oxycodone-Acetaminophen] Swelling     Tongue swelling    Premarin [Conjugated Estrogens] Other (See Comments)     \"extremely sick\"    Prinivil [Lisinopril] Swelling    Seldane [Terfenadine]     Strawberry Flavor     Sulfa Antibiotics     Tagamet [Cimetidine]     Temazepam Swelling    Terbinafine And Related  Tetracyclines & Related      Extremely sick    Trazodone And Nefazodone Other (See Comments)     \"shake and could not stop\"    Versed [Midazolam]     Voltaren [Diclofenac Sodium]     Zanaflex [Tizanidine Hcl]     Amoxicillin Rash    Penicillins Rash       Social History:  Reviewed. reports that she has never smoked. She has never used smokeless tobacco. She reports that she does not drink alcohol and does not use drugs. Family History:  Reviewed. family history includes Stroke in her mother. Review of System:    · Constitutional: No fevers, chills. · Eyes: No visual changes or diplopia. No scleral icterus. · ENT: No Headaches. No mouth sores or sore throat. · Cardiovascular: No for chest pain, No for dyspnea on exertion, No for palpitations or No for loss of consciousness. No cough, hemoptysis, No for pleuritic pain, or phlebitis. · Respiratory: No for cough or wheezing. No hematemesis. · Gastrointestinal: No abdominal pain, blood in stools. · Genitourinary: No dysuria, or hematuria. · Musculoskeletal: No gait disturbance,    · Integumentary: No rash or pruritis. · Neurological: No headache, change in muscle strength, numbness or tingling. · Psychiatric: No anxiety, or depression. · Endocrine: No temperature intolerance. No excessive thirst, fluid intake, or urination. · Hem/Lymph: No abnormal bruising or bleeding, blood clots or swollen lymph nodes. · Allergic/Immunologic: No nasal congestion or hives. Physical Examination:  Vitals:    04/27/22 1350   BP: 132/74   Pulse: 82         Wt Readings from Last 3 Encounters:   01/05/22 190 lb (86.2 kg)   11/01/21 194 lb (88 kg)   10/26/21 190 lb (86.2 kg)       · Constitutional: Oriented. No distress. · Head: Normocephalic and atraumatic. · Mouth/Throat: Oropharynx is clear and moist.   · Eyes: Conjunctivae clear without jaunduice. PERRL. · Neck: Neck supple. No rigidity. No JVD present.     · Cardiovascular: Normal rate, regular rhythm, S1&S2. · Pulmonary/Chest: Bilateral respiratory sounds. No wheezes, No rhonchi. · Abdominal: Soft. Bowel sounds present. No distension, No tenderness. · Musculoskeletal: No tenderness. 2-3+ bilat LE edema    · Lymphadenopathy: Has no cervical adenopathy. · Neurological: Alert and oriented. Cranial nerve appears intact, No Gross deficit   · Skin: Skin is warm and dry. No rash noted. · Psychiatric: Has a normal mood, affect and behavior     Labs:  Reviewed. No results for input(s): NA, K, CL, CO2, PHOS, BUN, CREATININE, CA in the last 72 hours. Invalid input(s):  TSH  No results for input(s): WBC, HGB, HCT, MCV, PLT in the last 72 hours. Lab Results   Component Value Date    TROPONINI 0.15 07/14/2019     Lab Results   Component Value Date     12/06/2017     Lab Results   Component Value Date    PROTIME 11.5 07/13/2019    INR 1.01 07/13/2019     No results found for: CHOL, HDL, TRIG    ECG: Personally reviewed: AS, , HR 81, , QTc 526    ECHO: 7/15/2019  Summary   Suboptimal image quality. Definity contrast administered. Left ventricular   cavity size is normal. There is mild concentric left ventricular   hypertrophy. Overall left ventricular systolic function appears normal with   an estimated ejection fraction of 55-60% based off the parasternal images.   Cannot exclude regional wall motion abnormalities secondary to poor   endocardial visualization. Diastolic filling parameters suggests grade II   diastolic dysfunction.   Aortic valve leaflets appear thickened at the annulus. Individual aortic   valve leaflets are not clearly visualized. No evidence of aortic valve   regurgitation. No evidence of aortic valve stenosis.   There is mild tricuspid regurgitation present.       Problem List:   Patient Active Problem List    Diagnosis Date Noted    Essential hypertension 10/06/2017    Bilateral lower extremity edema     Pacemaker 07/23/2019    Acute kidney injury superimposed on CKD ([facility] 07/16/2019    Syncope and collapse     Bradycardia 07/13/2019    Complete heart block (HCC)     History of pulmonary embolism 02/26/2018    Chronic renal disease, stage 4, severely decreased glomerular filtration rate (GFR) between 15-29 mL/min/1.73 square meter (HCC) 02/26/2018    Chronic diastolic congestive heart failure ([facility] 12/21/2017    Hyperkalemia 10/29/2017        Assessment:   1. Bradycardia    2. CHB (complete heart block) ([facility])    3. Pacemaker    4. Chronic diastolic CHF (congestive heart failure) ([facility]    5. Benign essential HTN       Cardiac HX: Dayne Krabbe is a 78 y.o. woman with a h/o HTN, CKD stage III, obesity, chronic dCHF, DVT (10/2020), on Eliquis, who p/w fatigue, lightheadedness and LOC (7/2019), found to have bradycardia and type II AV block, temp PPM placed, BB held and AVB/bradycardia persisted, s/p dual chamber PPM (7/16/19, Dr. Huma Coyle).      High degree AV block  - S/p dual chamber MDT PPM with symptomatic improvement  - Device check shows 38.4% AP, 100% , no arrhythmias, dependent at VVI 30, other parameters stable. - MVP turned on   - Echo with high  burden - patient not interested, patient advised to monitor for increasing shortness of breath as well as increasing lower extremity edema  - Follow-up in 6 months  - ECG ordered and results personally reviewed     Chronic dCHF  - On torsemide 20 mg QD along with 20 mg as needed for weight > 192#  - Last Cr 2.5, K+ 3.3 - follows with Dr. Noah Eduardo  - Reviewed recent labs  - Daily weights   - Lifestyle modification - low Na+ diet, weight loss, exercise     HTN  - BP well controlled  - On Imdur 30 mg     EF of 55 to 60%  No ACEi for systolic HF d/t low BP  No known CAD  No known AF  No Tobacco use. All questions and concerns were addressed to the patient/family. Alternatives to my treatment were discussed. The note was completed using EMR.  Every effort was made to ensure accuracy; however, inadvertent computerized transcription errors may be present. Patient received education regarding their diagnosis, treatment and medications while in the office today.       Flavia Quiroz [address] [facility]

## 2022-04-19 ENCOUNTER — NURSE ONLY (OUTPATIENT)
Dept: CARDIOLOGY CLINIC | Age: 82
End: 2022-04-19
Payer: MEDICARE

## 2022-04-19 DIAGNOSIS — Z95.0 PACEMAKER: ICD-10-CM

## 2022-04-19 DIAGNOSIS — I44.2 COMPLETE HEART BLOCK (HCC): ICD-10-CM

## 2022-04-20 NOTE — PROGRESS NOTES
We received remote transmission from patient's dual chamber pacemaker monitor at home. Transmission shows normal sensing and pacing function. No new arrhythmias/events recorded. Ap 44.0%   100% (MVP On)  Echo 07.2019 showed EF of 55-60%. EP physician will review. See interrogation under cardiology tab in the [address] [place] [address] [address] field for more details. Will continue to monitor remotely.

## 2022-04-21 PROCEDURE — 93294 REM INTERROG EVL PM/LDLS PM: CPT | Performed by: INTERNAL MEDICINE

## 2022-04-21 PROCEDURE — 93296 REM INTERROG EVL PM/IDS: CPT | Performed by: INTERNAL MEDICINE

## 2022-04-27 ENCOUNTER — NURSE ONLY (OUTPATIENT)
Dept: CARDIOLOGY CLINIC | Age: 82
End: 2022-04-27
Payer: MEDICARE

## 2022-04-27 ENCOUNTER — OFFICE VISIT (OUTPATIENT)
Dept: CARDIOLOGY CLINIC | Age: 82
End: 2022-04-27
Payer: MEDICARE

## 2022-04-27 VITALS
DIASTOLIC BLOOD PRESSURE: 74 MMHG | SYSTOLIC BLOOD PRESSURE: 132 MMHG | HEIGHT: 62 IN | BODY MASS INDEX: 34.75 KG/M2 | HEART RATE: 82 BPM

## 2022-04-27 DIAGNOSIS — Z95.0 PACEMAKER: ICD-10-CM

## 2022-04-27 DIAGNOSIS — R00.1 BRADYCARDIA: ICD-10-CM

## 2022-04-27 DIAGNOSIS — I50.32 CHRONIC DIASTOLIC CONGESTIVE HEART FAILURE (HCC): ICD-10-CM

## 2022-04-27 DIAGNOSIS — I44.2 COMPLETE HEART BLOCK (HCC): ICD-10-CM

## 2022-04-27 DIAGNOSIS — I10 BENIGN ESSENTIAL HTN: ICD-10-CM

## 2022-04-27 DIAGNOSIS — R00.1 BRADYCARDIA: Primary | ICD-10-CM

## 2022-04-27 DIAGNOSIS — I44.2 CHB (COMPLETE HEART BLOCK) (HCC): ICD-10-CM

## 2022-04-27 DIAGNOSIS — I50.32 CHRONIC DIASTOLIC CHF (CONGESTIVE HEART FAILURE) (HCC): ICD-10-CM

## 2022-04-27 DIAGNOSIS — R55 SYNCOPE AND COLLAPSE: ICD-10-CM

## 2022-04-27 PROCEDURE — 1036F TOBACCO NON-USER: CPT | Performed by: NURSE PRACTITIONER

## 2022-04-27 PROCEDURE — 1123F ACP DISCUSS/DSCN MKR DOCD: CPT | Performed by: NURSE PRACTITIONER

## 2022-04-27 PROCEDURE — 93280 PM DEVICE PROGR EVAL DUAL: CPT | Performed by: INTERNAL MEDICINE

## 2022-04-27 PROCEDURE — 93000 ELECTROCARDIOGRAM COMPLETE: CPT | Performed by: NURSE PRACTITIONER

## 2022-04-27 PROCEDURE — 1090F PRES/ABSN URINE INCON ASSESS: CPT | Performed by: NURSE PRACTITIONER

## 2022-04-27 PROCEDURE — 4040F PNEUMOC VAC/ADMIN/RCVD: CPT | Performed by: NURSE PRACTITIONER

## 2022-04-27 PROCEDURE — G8400 PT W/DXA NO RESULTS DOC: HCPCS | Performed by: NURSE PRACTITIONER

## 2022-04-27 PROCEDURE — G8417 CALC BMI ABV UP PARAM F/U: HCPCS | Performed by: NURSE PRACTITIONER

## 2022-04-27 PROCEDURE — 99214 OFFICE O/P EST MOD 30 MIN: CPT | Performed by: NURSE PRACTITIONER

## 2022-04-27 PROCEDURE — G8427 DOCREV CUR MEDS BY ELIG CLIN: HCPCS | Performed by: NURSE PRACTITIONER

## 2022-05-27 DIAGNOSIS — N18.4 CKD (CHRONIC KIDNEY DISEASE) STAGE 4, GFR 15-29 ML/MIN (HCC): ICD-10-CM

## 2022-05-27 LAB
ALBUMIN SERPL-MCNC: 4.3 G/DL (ref 3.4–5)
ANION GAP SERPL CALCULATED.3IONS-SCNC: 16 MMOL/L (ref 3–16)
BASOPHILS ABSOLUTE: 0.1 K/UL (ref 0–0.2)
BASOPHILS RELATIVE PERCENT: 0.5 %
BUN BLDV-MCNC: 48 MG/DL (ref 7–20)
CALCIUM SERPL-MCNC: 9.6 MG/DL (ref 8.3–10.6)
CHLORIDE BLD-SCNC: 95 MMOL/L (ref 99–110)
CO2: 27 MMOL/L (ref 21–32)
CREAT SERPL-MCNC: 2.5 MG/DL (ref 0.6–1.2)
EOSINOPHILS ABSOLUTE: 0.4 K/UL (ref 0–0.6)
EOSINOPHILS RELATIVE PERCENT: 4.4 %
GFR AFRICAN AMERICAN: 22
GFR NON-AFRICAN AMERICAN: 18
GLUCOSE BLD-MCNC: 121 MG/DL (ref 70–99)
HCT VFR BLD CALC: 40 % (ref 36–48)
HEMOGLOBIN: 13.3 G/DL (ref 12–16)
LYMPHOCYTES ABSOLUTE: 2.3 K/UL (ref 1–5.1)
LYMPHOCYTES RELATIVE PERCENT: 23.5 %
MCH RBC QN AUTO: 30.5 PG (ref 26–34)
MCHC RBC AUTO-ENTMCNC: 33.2 G/DL (ref 31–36)
MCV RBC AUTO: 91.8 FL (ref 80–100)
MONOCYTES ABSOLUTE: 0.8 K/UL (ref 0–1.3)
MONOCYTES RELATIVE PERCENT: 8.4 %
NEUTROPHILS ABSOLUTE: 6.1 K/UL (ref 1.7–7.7)
NEUTROPHILS RELATIVE PERCENT: 63.2 %
PARATHYROID HORMONE INTACT: 42.8 PG/ML (ref 14–72)
PDW BLD-RTO: 13.4 % (ref 12.4–15.4)
PHOSPHORUS: 4.4 MG/DL (ref 2.5–4.9)
PLATELET # BLD: 242 K/UL (ref 135–450)
PMV BLD AUTO: 8.9 FL (ref 5–10.5)
POTASSIUM SERPL-SCNC: 4.6 MMOL/L (ref 3.5–5.1)
RBC # BLD: 4.35 M/UL (ref 4–5.2)
SODIUM BLD-SCNC: 138 MMOL/L (ref 136–145)
WBC # BLD: 9.7 K/UL (ref 4–11)

## 2022-07-06 ENCOUNTER — OFFICE VISIT (OUTPATIENT)
Dept: CARDIOLOGY CLINIC | Age: 82
End: 2022-07-06
Payer: MEDICARE

## 2022-07-06 VITALS — DIASTOLIC BLOOD PRESSURE: 70 MMHG | HEART RATE: 60 BPM | SYSTOLIC BLOOD PRESSURE: 118 MMHG

## 2022-07-06 DIAGNOSIS — I51.89 DIASTOLIC DYSFUNCTION: ICD-10-CM

## 2022-07-06 DIAGNOSIS — Z95.0 PACEMAKER: ICD-10-CM

## 2022-07-06 DIAGNOSIS — I10 ESSENTIAL HYPERTENSION: ICD-10-CM

## 2022-07-06 DIAGNOSIS — I50.32 CHRONIC DIASTOLIC CONGESTIVE HEART FAILURE (HCC): Primary | ICD-10-CM

## 2022-07-06 DIAGNOSIS — I45.9 HB (HEART BLOCK): ICD-10-CM

## 2022-07-06 PROCEDURE — 1036F TOBACCO NON-USER: CPT | Performed by: INTERNAL MEDICINE

## 2022-07-06 PROCEDURE — 1090F PRES/ABSN URINE INCON ASSESS: CPT | Performed by: INTERNAL MEDICINE

## 2022-07-06 PROCEDURE — G8417 CALC BMI ABV UP PARAM F/U: HCPCS | Performed by: INTERNAL MEDICINE

## 2022-07-06 PROCEDURE — G8400 PT W/DXA NO RESULTS DOC: HCPCS | Performed by: INTERNAL MEDICINE

## 2022-07-06 PROCEDURE — 1123F ACP DISCUSS/DSCN MKR DOCD: CPT | Performed by: INTERNAL MEDICINE

## 2022-07-06 PROCEDURE — G8427 DOCREV CUR MEDS BY ELIG CLIN: HCPCS | Performed by: INTERNAL MEDICINE

## 2022-07-06 PROCEDURE — 99214 OFFICE O/P EST MOD 30 MIN: CPT | Performed by: INTERNAL MEDICINE

## 2022-07-06 ASSESSMENT — ENCOUNTER SYMPTOMS
SHORTNESS OF BREATH: 0
COUGH: 0
CHOKING: 0
CHEST TIGHTNESS: 0

## 2022-07-06 NOTE — PROGRESS NOTES
Subjective:      Patient ID: Mery Velazquez is a 78 y.o. female. Congestive Heart Failure  Pertinent negatives include no chest pain, fatigue, palpitations or shortness of breath. Here for follow up CHF/diastolic dysfunction/HTN.   DVT. On AC. No bleeding issues. Edema stable. Wt stable. No sob. No pnd. No orthopnea. No chest pain. No tachycardia/syncope. BP good at home. Past Medical History:   Diagnosis Date    CHF (congestive heart failure) (HCC)     Hypertension      Past Surgical History:   Procedure Laterality Date    CHOLECYSTECTOMY      HYSTERECTOMY       Social History     Socioeconomic History    Marital status:      Spouse name: Not on file    Number of children: Not on file    Years of education: Not on file    Highest education level: Not on file   Occupational History    Not on file   Tobacco Use    Smoking status: Never Smoker    Smokeless tobacco: Never Used   Substance and Sexual Activity    Alcohol use: No    Drug use: No    Sexual activity: Not Currently   Other Topics Concern    Not on file   Social History Narrative    Not on file     Social Determinants of Health     Financial Resource Strain:     Difficulty of Paying Living Expenses: Not on file   Food Insecurity:     Worried About [address] [employer] in the Last Year: Not on file    Lynn of Food in the Last Year: Not on file   Transportation Needs:     Lack of Transportation (Medical): Not on file    Lack of Transportation (Non-Medical):  Not on file   Physical Activity:     Days of Exercise per Week: Not on file    Minutes of Exercise per Session: Not on file   Stress:     Feeling of Stress : Not on file   Social Connections:     Frequency of Communication with Friends and Family: Not on file    Frequency of Social Gatherings with Friends and Family: Not on file    Attends Worship Services: Not on file    Active Member of Clubs or Organizations: Not on file    Attends Club or Organization Meetings: Not on file    Marital Status: Not on file   Intimate Partner Violence:     Fear of Current or Ex-Partner: Not on file    Emotionally Abused: Not on file    Physically Abused: Not on file    Sexually Abused: Not on file   Housing Stability:     Unable to Pay for Housing in the Last Year: Not on file    Number of Rub[employer] in the Last Year: Not on file    Unstable Housing in the Last Year: Not on file     FH reviewed personally with pt, denies FH cardiac issues        Vitals:    07/06/22 1250   BP: 118/70   Pulse: 60       wt 190      Review of Systems   Constitutional: Negative for activity change, appetite change and fatigue. Respiratory: Negative for cough, choking, chest tightness and shortness of breath. Cardiovascular: Negative for chest pain, palpitations and leg swelling. Denies PND or orthopnea. No tachycardia or syncope. Neurological: Negative for dizziness, syncope and light-headedness. Psychiatric/Behavioral: Negative for agitation, behavioral problems and confusion. All other systems reviewed and are negative. Objective:   Physical Exam  Constitutional:       General: She is not in acute distress. Appearance: She is well-developed. HENT:      Head: Normocephalic and atraumatic. Eyes:      General:         Right eye: No discharge. Left eye: No discharge. Conjunctiva/sclera: Conjunctivae normal.   Neck:      Vascular: No JVD. Cardiovascular:      Rate and Rhythm: Normal rate and regular rhythm. Pulses:           Radial pulses are 2+ on the right side and 2+ on the left side. Heart sounds: Normal heart sounds, S1 normal and S2 normal. No murmur heard. No gallop. Pulmonary:      Effort: Pulmonary effort is normal. No respiratory distress. Breath sounds: Normal breath sounds. No wheezing or rales. Abdominal:      General: Bowel sounds are normal.      Palpations: Abdomen is soft. Tenderness:  There is no abdominal tenderness. Musculoskeletal:         General: Normal range of motion. Cervical back: Normal range of motion. Comments: Tr Bilateral edema   Skin:     General: Skin is warm and dry. Neurological:      Mental Status: She is alert and oriented to person, place, and time. Psychiatric:         Behavior: Behavior normal.         Thought Content: Thought content normal.         Assessment:       Diagnosis Orders   1. Chronic diastolic congestive heart failure (HCC)     2. HB (heart block)     3. Pacemaker     4. Essential hypertension     5. Diastolic dysfunction             Plan:      CV stable. DVT stable. Continues on eliquis. No bleeding issues. Continue torsemide. Bp good. Edema is stable. Dosing torsemide by wt. Watching wt/edema. Renal following. bp is good. PPM followed by EP. No changes. Reviewed previous records and testing including echo 7/19. Continue to monitor. Follow up 6 months.

## 2022-07-21 ENCOUNTER — NURSE ONLY (OUTPATIENT)
Dept: CARDIOLOGY CLINIC | Age: 82
End: 2022-07-21
Payer: MEDICARE

## 2022-07-21 DIAGNOSIS — I44.2 COMPLETE HEART BLOCK (HCC): ICD-10-CM

## 2022-07-21 DIAGNOSIS — Z95.0 PACEMAKER: Primary | ICD-10-CM

## 2022-07-21 PROCEDURE — 93294 REM INTERROG EVL PM/LDLS PM: CPT | Performed by: INTERNAL MEDICINE

## 2022-07-21 PROCEDURE — 93296 REM INTERROG EVL PM/IDS: CPT | Performed by: INTERNAL MEDICINE

## 2022-07-30 NOTE — PROGRESS NOTES
Remote transmission received from patient's dual chamber pacemaker monitor at home. Transmission shows normal sensing and pacing function. No new arrhythmias/events recorded. Ap 38.4%   99.8% (MVP On)  Echo 07.2019 showed EF of 55-60%. End of 91-day monitoring period 7/21/22. EP physician will review. See interrogation under cardiology tab in the [address] [place] [address] [address] field for more details. Will continue to monitor remotely.

## 2022-08-19 DIAGNOSIS — N18.4 CKD (CHRONIC KIDNEY DISEASE) STAGE 4, GFR 15-29 ML/MIN (HCC): ICD-10-CM

## 2022-08-19 LAB
ALBUMIN SERPL-MCNC: 4 G/DL (ref 3.4–5)
ANION GAP SERPL CALCULATED.3IONS-SCNC: 16 MMOL/L (ref 3–16)
BUN BLDV-MCNC: 51 MG/DL (ref 7–20)
CALCIUM SERPL-MCNC: 9.6 MG/DL (ref 8.3–10.6)
CHLORIDE BLD-SCNC: 98 MMOL/L (ref 99–110)
CO2: 28 MMOL/L (ref 21–32)
CREAT SERPL-MCNC: 2.5 MG/DL (ref 0.6–1.2)
GFR AFRICAN AMERICAN: 22
GFR NON-AFRICAN AMERICAN: 18
GLUCOSE BLD-MCNC: 111 MG/DL (ref 70–99)
PARATHYROID HORMONE INTACT: 40.5 PG/ML (ref 14–72)
PHOSPHORUS: 5 MG/DL (ref 2.5–4.9)
POTASSIUM SERPL-SCNC: 4.5 MMOL/L (ref 3.5–5.1)
SODIUM BLD-SCNC: 142 MMOL/L (ref 136–145)

## 2022-10-04 NOTE — PROGRESS NOTES
[address]   Electrophysiology  Office Visit  Date: 10/24/2022    Chief Complaint   Patient presents with    Bradycardia       Cardiac HX: Светлана Rivas is a 78 y.o. woman with a h/o HTN, CKD stage III, obesity, chronic dCHF, DVT (10/2020), on Eliquis, who p/w fatigue, lightheadedness and LOC (7/2019), found to have bradycardia and type II AV block, temp PPM placed, BB held and AVB/bradycardia persisted, s/p dual chamber MDT PPM (7/16/19, Dr. Carlos Garcia). Interval History/HPI: Patient is here to follow-up for high degree AV block and PPM management. She was originally seen in 2019 when she presented with dizziness and lightheadedness. She was found to have sinus bradycardia and a type II AV block which she was symptomatic. A temporary pacemaker was placed and her beta-blocker was placed on hold. She continued to have persistent AV block and bradycardia and underwent a dual-chamber permanent pacemaker on 7/16/2019. Review of her device today shows that she atrially paces 39.4% of the time and ventricularly paces 99.8% of the time. She has underlying complete heart block at 30 bpm and is dependent on her device. She does paced 99.8% of the time in the ventricle. We have discussed having a repeat echocardiogram due to her high ventricular pacing however she is not interested at this time. She did recently see her primary care physician with a cough. He did a chest x-ray and felt she had extra fluid on board. He did increase her torsemide for couple days with resolution of her cough and orthopnea. Today she states that her symptoms are improved. She does follow with Dr. Yani Rojas for CRI. She is on a sliding scale torsemide dose. If her weight is less than 190 pounds then she takes nothing. If her weight is between 1 9195 she takes 1, if her weight is greater than 195 pounds and she takes 2 torsemide.   We did revisit repeating an echo to assess her EF and ventricular function however she remains not interested in pursuing an echo. Review of her device today shows that she atrially paces 39.4% of the time and ventricularly paces 99.8% of the time. She has had no arrhythmias on her device. Other parameters are stable. She denies any chest pain, SOB, PND, orthopnea. She remains in a wheelchair. Her left lower extremity is wrapped. Home medications:   Current Outpatient Medications on File Prior to Visit   Medication Sig Dispense Refill    traMADol (ULTRAM) 50 MG tablet Take 1 tablet by mouth 2 times daily as needed for Pain for up to 60 days. 60 tablet 2    isosorbide mononitrate (IMDUR) 30 MG extended release tablet Take 1 tablet by mouth daily 90 tablet 3    torsemide (DEMADEX) 20 MG tablet Take 1 tablet by mouth 2 times daily 180 tablet 1    cinacalcet (SENSIPAR) 30 MG tablet TAKE 1 TABLET BY MOUTH 3 TIMES A WEEK 12 tablet 3    apixaban (ELIQUIS) 5 MG TABS tablet 2.5 mg       diphenhydrAMINE (BENADRYL) 25 MG tablet Take 25 mg by mouth every 6 hours as needed for Itching      acetaminophen (TYLENOL) 500 MG tablet Take 2,000 mg by mouth daily       mometasone (NASONEX) 50 MCG/ACT nasal spray 2 sprays by Nasal route as needed       PROMETHAZINE-DM PO Take by mouth      diclofenac sodium 1 % GEL Apply 4 g topically daily as needed      phenylephrine-mineral oil-petrolatum (PREPARATION H) 0.25-14-74.9 % rectal ointment Place rectally 2 times daily as needed for Hemorrhoids      Cranberry 425 MG CAPS Take 4,200 mg by mouth       lidocaine (LIDODERM) 5 % Place 1 patch onto the skin daily 12 hours on, 12 hours off.  30 patch 0    chlordiazePOXIDE-clidinium (LIBRAX) 5-2.5 MG per capsule Take 1 capsule by mouth 4 times daily as needed      Fexofenadine HCl (ALLEGRA PO) Take 180 mg by mouth      Omega-3 Fatty Acids (FISH OIL PO) Take 1,200 mg by mouth      magnesium (MAGNESIUM-OXIDE) 250 MG TABS tablet Take 250 mg by mouth daily      cinacalcet (SENSIPAR) 30 MG tablet Take 1 tablet by mouth daily (Patient not taking: Reported on 10/24/2022) 30 tablet 3    levoFLOXacin (LEVAQUIN) 250 MG tablet  (Patient not taking: Reported on 10/24/2022)      ondansetron (ZOFRAN) 4 MG tablet TK 1 T PO Q 8 H PRN (Patient not taking: No sig reported)      triamcinolone (ARISTOCORT) 0.5 % ointment Apply topically 2 times daily Apply topically 2 times daily. (Patient not taking: Reported on 10/24/2022)      Glucosamine-Chondroit-Vit C-Mn (GLUCOSAMINE 1500 COMPLEX PO) Take 1,500 mg by mouth daily  (Patient not taking: No sig reported)      senna (SENOKOT) 8.6 MG tablet Take 1 tablet by mouth daily (Patient not taking: Reported on 10/24/2022)      Multiple Vitamins-Minerals (MULTIVITAMIN ADULT PO) Take by mouth  (Patient not taking: Reported on 10/24/2022)       No current facility-administered medications on file prior to visit.        Past Medical History:   Diagnosis Date    CHF (congestive heart failure) (HCC)     Hypertension         Past Surgical History:   Procedure Laterality Date    CHOLECYSTECTOMY      HYSTERECTOMY (CERVIX STATUS UNKNOWN)         Allergies   Allergen Reactions    Accupril [Quinapril Hcl] Nausea Only    Anaprox [Naproxen Sodium] Other (See Comments)     \"makes me loopy\"    Biaxin [Clarithromycin] Other (See Comments)     \"extremely sick\"    Buspar [Buspirone] Other (See Comments)     \"go to sleep all over\"    Butrans [Buprenorphine] Swelling    Capoten [Captopril]      BP goes too low    Cardene [Nicardipine] Other (See Comments)     hypotension    Cardizem [Diltiazem Hcl] Other (See Comments)     \"go to sleep all over\"    Ciprofloxacin Other (See Comments)     \"dizzy\"    Codeine Other (See Comments)    Cymbalta [Duloxetine Hcl] Other (See Comments)     \"shake and could not stop\"    Demerol Hcl [Meperidine] Other (See Comments)     \"makes me wild as a deer\"    Doxycycline     Elavil [Amitriptyline Hcl]     Entex Lq [Phenylephrine-Guaifenesin]      Entex LA    Feldene [Piroxicam]     Fentanyl     Gabapentin Other (See Comments)     Leg pain    Hctz [Hydrochlorothiazide]      BP goes too low    Isoptin [Verapamil]     Keflex [Cephalexin]     Lorazepam Other (See Comments)     \"Shake can't stop\"    Lyrica [Pregabalin] Other (See Comments)     \"shake and could not stop\"    Meclomen [Meclofenamate]     Morphine Swelling     dizziness    Nabumetone     Nucynta [Tapentadol]      dizzy    Ofloxacin     Peanut Butter Flavor     Percocet [Oxycodone-Acetaminophen] Swelling     Tongue swelling    Premarin [Conjugated Estrogens] Other (See Comments)     \"extremely sick\"    Prinivil [Lisinopril] Swelling    Seldane [Terfenadine]     Strawberry Flavor     Sulfa Antibiotics     Tagamet [Cimetidine]     Temazepam Swelling    Terbinafine And Related     Tetracyclines & Related      Extremely sick    Trazodone And Nefazodone Other (See Comments)     \"shake and could not stop\"    Versed [Midazolam]     Voltaren [Diclofenac Sodium]     Zanaflex [Tizanidine Hcl]     Amoxicillin Rash    Penicillins Rash       Social History:  Reviewed. reports that she has never smoked. She has never used smokeless tobacco. She reports that she does not drink alcohol and does not use drugs. Family History:  Reviewed. family history includes Stroke in her mother. Review of System:    Constitutional: No fevers, chills. Eyes: No visual changes or diplopia. No scleral icterus. ENT: No Headaches. No mouth sores or sore throat. Cardiovascular: No for chest pain, No for dyspnea on exertion, No for palpitations or No for loss of consciousness. No cough, hemoptysis, No for pleuritic pain, or phlebitis. Respiratory: No for cough or wheezing. No hematemesis. Gastrointestinal: No abdominal pain, blood in stools. Genitourinary: No dysuria, or hematuria. Musculoskeletal: No gait disturbance,    Integumentary: No rash or pruritis. Neurological: No headache, change in muscle strength, numbness or tingling.    Psychiatric: No anxiety, or depression. Endocrine: No temperature intolerance. No excessive thirst, fluid intake, or urination. Hem/Lymph: No abnormal bruising or bleeding, blood clots or swollen lymph nodes. Allergic/Immunologic: No nasal congestion or hives. Physical Examination:  Vitals:    10/24/22 1412   BP: 118/70   Pulse: 78         Wt Readings from Last 3 Encounters:   10/24/22 195 lb (88.5 kg)   06/03/22 194 lb (88 kg)   01/05/22 190 lb (86.2 kg)     Constitutional: Oriented. No distress. Head: Normocephalic and atraumatic. Mouth/Throat: Oropharynx is clear and moist.   Eyes: Conjunctivae clear without jaunduice. PERRL. Neck: Neck supple. No rigidity. No JVD present. Cardiovascular: Normal rate, regular rhythm, S1&S2. Pulmonary/Chest: Bilateral respiratory sounds. No wheezes, No rhonchi. Abdominal: Soft. Bowel sounds present. No distension, No tenderness. Musculoskeletal: No tenderness. 2-3+ bilat LE edema    Lymphadenopathy: Has no cervical adenopathy. Neurological: Alert and oriented. Cranial nerve appears intact, No Gross deficit   Skin: Skin is warm and dry. No rash noted. Psychiatric: Has a normal mood, affect and behavior     Labs:  Reviewed. No results for input(s): NA, K, CL, CO2, PHOS, BUN, CREATININE, CA in the last 72 hours. Invalid input(s):  TSH  No results for input(s): WBC, HGB, HCT, MCV, PLT in the last 72 hours. Lab Results   Component Value Date/Time    TROPONINI 0.15 07/14/2019 04:00 AM     Lab Results   Component Value Date/Time     12/06/2017 03:15 PM     Lab Results   Component Value Date/Time    PROTIME 11.5 07/13/2019 01:26 PM    INR 1.01 07/13/2019 01:26 PM     No results found for: CHOL, HDL, TRIG    ECG: Personally reviewed: AS, , HR 78, , QTc 507    ECHO: 7/15/2019  Summary   Suboptimal image quality. Definity contrast administered. Left ventricular   cavity size is normal. There is mild concentric left ventricular   hypertrophy.  Overall left ventricular systolic function appears normal with   an estimated ejection fraction of 55-60% based off the parasternal images. Cannot exclude regional wall motion abnormalities secondary to poor   endocardial visualization. Diastolic filling parameters suggests grade II   diastolic dysfunction. Aortic valve leaflets appear thickened at the annulus. Individual aortic   valve leaflets are not clearly visualized. No evidence of aortic valve   regurgitation. No evidence of aortic valve stenosis. There is mild tricuspid regurgitation present. Problem List:   Patient Active Problem List    Diagnosis Date Noted    Essential hypertension 10/06/2017    Bilateral lower extremity edema     Pacemaker 07/23/2019    Acute kidney injury superimposed on CKD ([address] 07/16/2019    Syncope and collapse     Bradycardia 07/13/2019    Complete heart block (HCC)     History of pulmonary embolism 02/26/2018    Chronic renal disease, stage 4, severely decreased glomerular filtration rate (GFR) between 15-29 mL/min/1.73 square meter ([address] 02/26/2018    Chronic diastolic congestive heart failure ([address] 12/21/2017    Hyperkalemia 10/29/2017        Assessment:   1. Complete heart block ([address]    2. Pacemaker    3. Chronic diastolic CHF (congestive heart failure) ([address]    4. Benign essential HTN         Cardiac HX: Yenifer Uribe is a 78 y.o. woman with a h/o HTN, CKD stage III, obesity, chronic dCHF, DVT (10/2020), on Eliquis, who p/w fatigue, lightheadedness and LOC (7/2019), found to have bradycardia and type II AV block, temp PPM placed, BB held and AVB/bradycardia persisted, s/p dual chamber PPM (7/16/19, Dr. Mariia Manuel). High degree AV block  - S/p dual chamber MDT PPM with symptomatic improvement  - Device check shows 39.4% AP, 99.8% , no arrhythmias, dependent at VVI 30, other parameters stable.   - Consider echo with high  burden - discussed at length, patient not interested, patient advised to monitor for increasing shortness of breath as well as increasing lower extremity edema  - Follow-up in 6 months - patient only wants to f/u with NP at this point  - ECG ordered and results personally reviewed     Chronic dCHF  - On torsemide sliding scale:  <190 # no torsemide, 190-195 #- 1 tab, >195 # then 2 tabs   - Last Cr 2.5, K+ 4.5 - follows with Dr. Syed Jon  - Reviewed recent labs  - Daily weights   - Lifestyle modification - low Na+ diet, weight loss, exercise     HTN  - BP well controlled  - On Imdur 30 mg     EF of 55 to 60%  No ACEi for systolic HF d/t low BP  No known CAD  No known AF  No Tobacco use. All questions and concerns were addressed to the patient/family. Alternatives to my treatment were discussed. The note was completed using EMR. Every effort was made to ensure accuracy; however, inadvertent computerized transcription errors may be present. Patient received education regarding their diagnosis, treatment and medications while in the office today. Ananth Rodriguez [facility]    I  have spent 40 minutes in care of the patient including direct face to face time, chart preparation, reviewing diagnostic testing, other provider notes and coordinating patient care.

## 2022-10-24 ENCOUNTER — NURSE ONLY (OUTPATIENT)
Dept: CARDIOLOGY CLINIC | Age: 82
End: 2022-10-24
Payer: MEDICARE

## 2022-10-24 ENCOUNTER — OFFICE VISIT (OUTPATIENT)
Dept: CARDIOLOGY CLINIC | Age: 82
End: 2022-10-24
Payer: MEDICARE

## 2022-10-24 VITALS
SYSTOLIC BLOOD PRESSURE: 118 MMHG | HEART RATE: 78 BPM | WEIGHT: 195 LBS | DIASTOLIC BLOOD PRESSURE: 70 MMHG | BODY MASS INDEX: 35.67 KG/M2

## 2022-10-24 DIAGNOSIS — I44.2 COMPLETE HEART BLOCK (HCC): ICD-10-CM

## 2022-10-24 DIAGNOSIS — I44.2 COMPLETE HEART BLOCK (HCC): Primary | ICD-10-CM

## 2022-10-24 DIAGNOSIS — R00.1 BRADYCARDIA: ICD-10-CM

## 2022-10-24 DIAGNOSIS — Z95.0 PACEMAKER: Primary | ICD-10-CM

## 2022-10-24 DIAGNOSIS — I10 BENIGN ESSENTIAL HTN: ICD-10-CM

## 2022-10-24 DIAGNOSIS — Z95.0 PACEMAKER: ICD-10-CM

## 2022-10-24 DIAGNOSIS — I50.32 CHRONIC DIASTOLIC CHF (CONGESTIVE HEART FAILURE) (HCC): ICD-10-CM

## 2022-10-24 DIAGNOSIS — I50.32 CHRONIC DIASTOLIC CONGESTIVE HEART FAILURE (HCC): ICD-10-CM

## 2022-10-24 PROCEDURE — G8484 FLU IMMUNIZE NO ADMIN: HCPCS | Performed by: NURSE PRACTITIONER

## 2022-10-24 PROCEDURE — 93280 PM DEVICE PROGR EVAL DUAL: CPT | Performed by: INTERNAL MEDICINE

## 2022-10-24 PROCEDURE — 1036F TOBACCO NON-USER: CPT | Performed by: NURSE PRACTITIONER

## 2022-10-24 PROCEDURE — G8427 DOCREV CUR MEDS BY ELIG CLIN: HCPCS | Performed by: NURSE PRACTITIONER

## 2022-10-24 PROCEDURE — 1123F ACP DISCUSS/DSCN MKR DOCD: CPT | Performed by: NURSE PRACTITIONER

## 2022-10-24 PROCEDURE — 99215 OFFICE O/P EST HI 40 MIN: CPT | Performed by: NURSE PRACTITIONER

## 2022-10-24 PROCEDURE — G8417 CALC BMI ABV UP PARAM F/U: HCPCS | Performed by: NURSE PRACTITIONER

## 2022-10-24 PROCEDURE — G8400 PT W/DXA NO RESULTS DOC: HCPCS | Performed by: NURSE PRACTITIONER

## 2022-10-24 PROCEDURE — 1090F PRES/ABSN URINE INCON ASSESS: CPT | Performed by: NURSE PRACTITIONER

## 2022-10-24 NOTE — PROGRESS NOTES
Patient comes in for programming evaluation on their Medtronic [id number] [place] XT DR MRI DC PPM.    Last remote 7/21/22    All sensing and pacing parameters are within normal range. Battery life 8.8 years  AP 39.4%.  99.8%. AT/AF burden <0.1%  PVC <0.1/hr  No episodes noted. Patient remains on Torsemide, Eliquis  No changes need to be made at this time. Please see interrogation for more detail. Patient will see NPFW today and follow up in 3 months in office or remotely.

## 2022-10-27 NOTE — RESULT ENCOUNTER NOTE
Unremarkable device check.    Continue to monitor    Hellenmahi Gomes MD   Cardiac Electrophysiology  [facility]  Office [phone]

## 2022-11-17 ENCOUNTER — NURSE ONLY (OUTPATIENT)
Dept: CARDIOLOGY CLINIC | Age: 82
End: 2022-11-17
Payer: MEDICARE

## 2022-11-17 DIAGNOSIS — Z95.0 PACEMAKER: ICD-10-CM

## 2022-11-17 DIAGNOSIS — R00.1 BRADYCARDIA: Primary | ICD-10-CM

## 2022-11-17 PROCEDURE — 93294 REM INTERROG EVL PM/LDLS PM: CPT | Performed by: INTERNAL MEDICINE

## 2022-11-17 PROCEDURE — 93296 REM INTERROG EVL PM/IDS: CPT | Performed by: INTERNAL MEDICINE

## 2022-11-17 NOTE — PROGRESS NOTES
Remote transmission received from patient's dual chamber pacemaker monitor at home. Transmission shows normal sensing and pacing function. No new arrhythmias/events recorded. Ap 37.0%   99.9% (MVP On)  Echo 07.2019 showed EF of 55-60%. EP physician will review. See interrogation under cardiology tab in the [address] [place] [address] [address] field for more details. Will continue to monitor remotely. (End of 91-day monitoring period 11/17/22).

## 2022-12-06 DIAGNOSIS — I50.9 CONGESTIVE HEART FAILURE, UNSPECIFIED HF CHRONICITY, UNSPECIFIED HEART FAILURE TYPE (HCC): ICD-10-CM

## 2022-12-06 LAB
ALBUMIN SERPL-MCNC: 3.7 G/DL (ref 3.4–5)
ANION GAP SERPL CALCULATED.3IONS-SCNC: 16 MMOL/L (ref 3–16)
BASOPHILS ABSOLUTE: 0.1 K/UL (ref 0–0.2)
BASOPHILS RELATIVE PERCENT: 0.7 %
BUN BLDV-MCNC: 40 MG/DL (ref 7–20)
CALCIUM SERPL-MCNC: 9.6 MG/DL (ref 8.3–10.6)
CHLORIDE BLD-SCNC: 95 MMOL/L (ref 99–110)
CO2: 27 MMOL/L (ref 21–32)
CREAT SERPL-MCNC: 2 MG/DL (ref 0.6–1.2)
EOSINOPHILS ABSOLUTE: 0.3 K/UL (ref 0–0.6)
EOSINOPHILS RELATIVE PERCENT: 3.3 %
GFR SERPL CREATININE-BSD FRML MDRD: 24 ML/MIN/{1.73_M2}
GLUCOSE BLD-MCNC: 119 MG/DL (ref 70–99)
HCT VFR BLD CALC: 37.3 % (ref 36–48)
HEMOGLOBIN: 12.2 G/DL (ref 12–16)
LYMPHOCYTES ABSOLUTE: 1.8 K/UL (ref 1–5.1)
LYMPHOCYTES RELATIVE PERCENT: 18.9 %
MCH RBC QN AUTO: 30.4 PG (ref 26–34)
MCHC RBC AUTO-ENTMCNC: 32.7 G/DL (ref 31–36)
MCV RBC AUTO: 93 FL (ref 80–100)
MONOCYTES ABSOLUTE: 0.9 K/UL (ref 0–1.3)
MONOCYTES RELATIVE PERCENT: 8.9 %
NEUTROPHILS ABSOLUTE: 6.6 K/UL (ref 1.7–7.7)
NEUTROPHILS RELATIVE PERCENT: 68.2 %
PARATHYROID HORMONE INTACT: 74.1 PG/ML (ref 14–72)
PDW BLD-RTO: 12.8 % (ref 12.4–15.4)
PHOSPHORUS: 3.8 MG/DL (ref 2.5–4.9)
PLATELET # BLD: 204 K/UL (ref 135–450)
PMV BLD AUTO: 9.7 FL (ref 5–10.5)
POTASSIUM SERPL-SCNC: 3.3 MMOL/L (ref 3.5–5.1)
PRO-BNP: 2914 PG/ML (ref 0–449)
RBC # BLD: 4.01 M/UL (ref 4–5.2)
SODIUM BLD-SCNC: 138 MMOL/L (ref 136–145)
WBC # BLD: 9.7 K/UL (ref 4–11)

## 2023-02-15 ENCOUNTER — OFFICE VISIT (OUTPATIENT)
Dept: CARDIOLOGY CLINIC | Age: 83
End: 2023-02-15
Payer: MEDICARE

## 2023-02-15 VITALS — DIASTOLIC BLOOD PRESSURE: 60 MMHG | SYSTOLIC BLOOD PRESSURE: 114 MMHG | HEART RATE: 60 BPM

## 2023-02-15 DIAGNOSIS — Z86.718 HISTORY OF DEEP VEIN THROMBOSIS: ICD-10-CM

## 2023-02-15 DIAGNOSIS — I50.32 CHRONIC DIASTOLIC CONGESTIVE HEART FAILURE (HCC): Primary | ICD-10-CM

## 2023-02-15 DIAGNOSIS — I51.89 DIASTOLIC DYSFUNCTION: ICD-10-CM

## 2023-02-15 DIAGNOSIS — I45.9 HB (HEART BLOCK): ICD-10-CM

## 2023-02-15 DIAGNOSIS — I10 ESSENTIAL HYPERTENSION: ICD-10-CM

## 2023-02-15 DIAGNOSIS — Z95.0 PACEMAKER: ICD-10-CM

## 2023-02-15 PROCEDURE — 1090F PRES/ABSN URINE INCON ASSESS: CPT | Performed by: INTERNAL MEDICINE

## 2023-02-15 PROCEDURE — G8417 CALC BMI ABV UP PARAM F/U: HCPCS | Performed by: INTERNAL MEDICINE

## 2023-02-15 PROCEDURE — 1123F ACP DISCUSS/DSCN MKR DOCD: CPT | Performed by: INTERNAL MEDICINE

## 2023-02-15 PROCEDURE — 3078F DIAST BP <80 MM HG: CPT | Performed by: INTERNAL MEDICINE

## 2023-02-15 PROCEDURE — 99214 OFFICE O/P EST MOD 30 MIN: CPT | Performed by: INTERNAL MEDICINE

## 2023-02-15 PROCEDURE — G8400 PT W/DXA NO RESULTS DOC: HCPCS | Performed by: INTERNAL MEDICINE

## 2023-02-15 PROCEDURE — 1036F TOBACCO NON-USER: CPT | Performed by: INTERNAL MEDICINE

## 2023-02-15 PROCEDURE — 3074F SYST BP LT 130 MM HG: CPT | Performed by: INTERNAL MEDICINE

## 2023-02-15 PROCEDURE — G8484 FLU IMMUNIZE NO ADMIN: HCPCS | Performed by: INTERNAL MEDICINE

## 2023-02-15 PROCEDURE — G8427 DOCREV CUR MEDS BY ELIG CLIN: HCPCS | Performed by: INTERNAL MEDICINE

## 2023-02-15 ASSESSMENT — ENCOUNTER SYMPTOMS
COUGH: 0
SHORTNESS OF BREATH: 0
CHOKING: 0
CHEST TIGHTNESS: 0

## 2023-02-15 NOTE — PROGRESS NOTES
Subjective:      Patient ID: Lisbet Flores is a 78 y.o. female. Here for follow up CHF/diastolic dysfunction/HTN.   DVT/HB/pacemaker. On AC. No bleeding issues. Edema stable. Wt stable. No sob. No pnd. No orthopnea. No chest pain. No tachycardia/syncope. BP good at home. Past Medical History:   Diagnosis Date    CHF (congestive heart failure) (HCC)     Hypertension      Past Surgical History:   Procedure Laterality Date    CHOLECYSTECTOMY      HYSTERECTOMY (CERVIX STATUS UNKNOWN)       Social History     Socioeconomic History    Marital status:      Spouse name: Not on file    Number of children: Not on file    Years of education: Not on file    Highest education level: Not on file   Occupational History    Not on file   Tobacco Use    Smoking status: Never    Smokeless tobacco: Never   Substance and Sexual Activity    Alcohol use: No    Drug use: No    Sexual activity: Not Currently   Other Topics Concern    Not on file   Social History Narrative    Not on file     Social Determinants of Health     Financial Resource Strain: Not on file   Food Insecurity: Not on file   Transportation Needs: Not on file   Physical Activity: Not on file   Stress: Not on file   Social Connections: Not on file   Intimate Partner Violence: Not on file   Housing Stability: Not on file     FH reviewed personally with pt, denies FH cardiac issues        Vitals:    02/15/23 1338   BP: 114/60   Pulse: 60         wt unable, previously 190      Review of Systems   Constitutional:  Negative for activity change, appetite change and fatigue. Respiratory:  Negative for cough, choking, chest tightness and shortness of breath. Cardiovascular:  Negative for chest pain, palpitations and leg swelling. Denies PND or orthopnea. No tachycardia or syncope. Neurological:  Negative for dizziness, syncope and light-headedness. Psychiatric/Behavioral:  Negative for agitation, behavioral problems and confusion. All other systems reviewed and are negative. Objective:   Physical Exam  Constitutional:       General: She is not in acute distress. Appearance: She is well-developed. HENT:      Head: Normocephalic and atraumatic. Eyes:      General:         Right eye: No discharge. Left eye: No discharge. Conjunctiva/sclera: Conjunctivae normal.   Neck:      Vascular: No JVD. Cardiovascular:      Rate and Rhythm: Normal rate and regular rhythm. Pulses:           Radial pulses are 2+ on the right side and 2+ on the left side. Heart sounds: Normal heart sounds, S1 normal and S2 normal. No murmur heard. No gallop. Pulmonary:      Effort: Pulmonary effort is normal. No respiratory distress. Breath sounds: Normal breath sounds. No wheezing or rales. Abdominal:      General: Bowel sounds are normal.      Palpations: Abdomen is soft. Tenderness: There is no abdominal tenderness. Musculoskeletal:         General: Normal range of motion. Cervical back: Normal range of motion. Comments: Tr Bilateral edema   Skin:     General: Skin is warm and dry. Neurological:      Mental Status: She is alert and oriented to person, place, and time. Psychiatric:         Behavior: Behavior normal.         Thought Content: Thought content normal.       Assessment:       Diagnosis Orders   1. Chronic diastolic congestive heart failure ([address]        2. Diastolic dysfunction        3. Essential hypertension        4. History of deep vein thrombosis        5. HB (heart block)        6. Pacemaker                  Plan:      CV stable. DVT stable. Continues on eliquis. No bleeding issues. Continue torsemide. Bp good. Edema is stable. Dosing torsemide by wt. Watching wt/edema. Renal following. bp is good. PPM followed by EP. No changes. Reviewed previous records and testing including echo 7/19. Continue to monitor. Follow up 6 months.

## 2023-03-02 ENCOUNTER — NURSE ONLY (OUTPATIENT)
Dept: CARDIOLOGY CLINIC | Age: 83
End: 2023-03-02
Payer: MEDICARE

## 2023-03-02 DIAGNOSIS — I44.2 COMPLETE HEART BLOCK (HCC): Primary | ICD-10-CM

## 2023-03-02 DIAGNOSIS — Z95.0 PACEMAKER: ICD-10-CM

## 2023-03-02 PROCEDURE — 93296 REM INTERROG EVL PM/IDS: CPT | Performed by: INTERNAL MEDICINE

## 2023-03-02 PROCEDURE — 93294 REM INTERROG EVL PM/LDLS PM: CPT | Performed by: INTERNAL MEDICINE

## 2023-03-06 NOTE — PROGRESS NOTES
Remote transmission received from patient's dual chamber pacemaker monitor at home. Transmission shows normal sensing and pacing function. No new arrhythmias/events recorded. Ap 36.5%   100% (MVP On)  Echo 07.2019 showed EF of 55-60%. EP physician will review. See interrogation under cardiology tab in the [address] [place] [address] [address] field for more details. Will continue to monitor remotely.      (End of 91-day monitoring period 3/2/23)

## 2023-03-07 NOTE — RESULT ENCOUNTER NOTE
Unremarkable device check.    Continue to monitor    Luisito Jimenez MD   Cardiac Electrophysiology  [facility]  Office [phone]

## 2023-07-21 DIAGNOSIS — N18.4 CKD (CHRONIC KIDNEY DISEASE) STAGE 4, GFR 15-29 ML/MIN (HCC): ICD-10-CM

## 2023-07-21 DIAGNOSIS — I50.9 CONGESTIVE HEART FAILURE, UNSPECIFIED HF CHRONICITY, UNSPECIFIED HEART FAILURE TYPE (HCC): ICD-10-CM

## 2023-07-22 LAB
ALBUMIN SERPL-MCNC: 4 G/DL (ref 3.4–5)
ANION GAP SERPL CALCULATED.3IONS-SCNC: 17 MMOL/L (ref 3–16)
BASOPHILS # BLD: 0.1 K/UL (ref 0–0.2)
BASOPHILS NFR BLD: 0.7 %
BUN SERPL-MCNC: 53 MG/DL (ref 7–20)
CALCIUM SERPL-MCNC: 10.9 MG/DL (ref 8.3–10.6)
CHLORIDE SERPL-SCNC: 94 MMOL/L (ref 99–110)
CO2 SERPL-SCNC: 28 MMOL/L (ref 21–32)
CREAT SERPL-MCNC: 3.4 MG/DL (ref 0.6–1.2)
DEPRECATED RDW RBC AUTO: 13.5 % (ref 12.4–15.4)
EOSINOPHIL # BLD: 0.3 K/UL (ref 0–0.6)
EOSINOPHIL NFR BLD: 3.5 %
GFR SERPLBLD CREATININE-BSD FMLA CKD-EPI: 13 ML/MIN/{1.73_M2}
GLUCOSE SERPL-MCNC: 106 MG/DL (ref 70–99)
HCT VFR BLD AUTO: 37.9 % (ref 36–48)
HGB BLD-MCNC: 13.1 G/DL (ref 12–16)
LYMPHOCYTES # BLD: 2.2 K/UL (ref 1–5.1)
LYMPHOCYTES NFR BLD: 25.7 %
MCH RBC QN AUTO: 31.8 PG (ref 26–34)
MCHC RBC AUTO-ENTMCNC: 34.6 G/DL (ref 31–36)
MCV RBC AUTO: 92 FL (ref 80–100)
MONOCYTES # BLD: 0.8 K/UL (ref 0–1.3)
MONOCYTES NFR BLD: 9.4 %
NEUTROPHILS # BLD: 5.3 K/UL (ref 1.7–7.7)
NEUTROPHILS NFR BLD: 60.7 %
NT-PROBNP SERPL-MCNC: 2778 PG/ML (ref 0–449)
PHOSPHATE SERPL-MCNC: 4.2 MG/DL (ref 2.5–4.9)
PLATELET # BLD AUTO: 197 K/UL (ref 135–450)
PMV BLD AUTO: 9.4 FL (ref 5–10.5)
POTASSIUM SERPL-SCNC: 3.6 MMOL/L (ref 3.5–5.1)
RBC # BLD AUTO: 4.12 M/UL (ref 4–5.2)
SODIUM SERPL-SCNC: 139 MMOL/L (ref 136–145)
WBC # BLD AUTO: 8.6 K/UL (ref 4–11)

## 2023-08-16 ENCOUNTER — OFFICE VISIT (OUTPATIENT)
Dept: CARDIOLOGY CLINIC | Age: 83
End: 2023-08-16

## 2023-08-16 VITALS — DIASTOLIC BLOOD PRESSURE: 60 MMHG | SYSTOLIC BLOOD PRESSURE: 110 MMHG | HEART RATE: 60 BPM

## 2023-08-16 DIAGNOSIS — I50.32 CHRONIC DIASTOLIC CONGESTIVE HEART FAILURE (HCC): Primary | ICD-10-CM

## 2023-08-16 DIAGNOSIS — I51.89 DIASTOLIC DYSFUNCTION: ICD-10-CM

## 2023-08-16 DIAGNOSIS — I45.9 HB (HEART BLOCK): ICD-10-CM

## 2023-08-16 DIAGNOSIS — Z95.0 PACEMAKER: ICD-10-CM

## 2023-08-16 DIAGNOSIS — Z86.718 HISTORY OF DEEP VEIN THROMBOSIS: ICD-10-CM

## 2023-08-16 DIAGNOSIS — I10 ESSENTIAL HYPERTENSION: ICD-10-CM

## 2023-08-16 ASSESSMENT — ENCOUNTER SYMPTOMS
SHORTNESS OF BREATH: 0
CHOKING: 0
CHEST TIGHTNESS: 0
COUGH: 0

## 2023-08-16 NOTE — PROGRESS NOTES
Subjective:      Patient ID: Luis Lieberman is a 78 y.o. female. Here for follow up CHF/diastolic dysfunction/HTN.   DVT/HB/pacemaker. On AC. No bleeding issues. Edema stable. Wt stable. No sob. No pnd. No orthopnea. No chest pain. No tachycardia/syncope. BP good at home. Past Medical History:   Diagnosis Date    CHF (congestive heart failure) (HCC)     Hypertension      Past Surgical History:   Procedure Laterality Date    CHOLECYSTECTOMY      HYSTERECTOMY (CERVIX STATUS UNKNOWN)       Social History     Socioeconomic History    Marital status:      Spouse name: Not on file    Number of children: Not on file    Years of education: Not on file    Highest education level: Not on file   Occupational History    Not on file   Tobacco Use    Smoking status: Never    Smokeless tobacco: Never   Substance and Sexual Activity    Alcohol use: No    Drug use: No    Sexual activity: Not Currently   Other Topics Concern    Not on file   Social History Narrative    Not on file     Social Determinants of Health     Financial Resource Strain: Not on file   Food Insecurity: Not on file   Transportation Needs: Not on file   Physical Activity: Not on file   Stress: Not on file   Social Connections: Not on file   Intimate Partner Violence: Not on file   Housing Stability: Not on file     FH reviewed personally with pt, denies FH cardiac issues        Vitals:    08/16/23 1301   BP: 110/60   Pulse: 60         wt unable, previously 190      Review of Systems   Constitutional:  Negative for activity change, appetite change and fatigue. Respiratory:  Negative for cough, choking, chest tightness and shortness of breath. Cardiovascular:  Negative for chest pain, palpitations and leg swelling. Denies PND or orthopnea. No tachycardia or syncope. Neurological:  Negative for dizziness, syncope and light-headedness. Psychiatric/Behavioral:  Negative for agitation, behavioral problems and confusion.

## 2023-09-20 DIAGNOSIS — I50.9 CONGESTIVE HEART FAILURE, UNSPECIFIED HF CHRONICITY, UNSPECIFIED HEART FAILURE TYPE (HCC): ICD-10-CM

## 2023-09-20 DIAGNOSIS — N18.4 CKD (CHRONIC KIDNEY DISEASE) STAGE 4, GFR 15-29 ML/MIN (HCC): ICD-10-CM

## 2023-09-20 LAB
ALBUMIN SERPL-MCNC: 3.9 G/DL (ref 3.4–5)
ANION GAP SERPL CALCULATED.3IONS-SCNC: 10 MMOL/L (ref 3–16)
BASOPHILS # BLD: 0.1 K/UL (ref 0–0.2)
BASOPHILS NFR BLD: 1.3 %
BUN SERPL-MCNC: 39 MG/DL (ref 7–20)
CALCIUM SERPL-MCNC: 11.1 MG/DL (ref 8.3–10.6)
CHLORIDE SERPL-SCNC: 98 MMOL/L (ref 99–110)
CO2 SERPL-SCNC: 30 MMOL/L (ref 21–32)
CREAT SERPL-MCNC: 2.6 MG/DL (ref 0.6–1.2)
DEPRECATED RDW RBC AUTO: 13.8 % (ref 12.4–15.4)
EOSINOPHIL # BLD: 0.3 K/UL (ref 0–0.6)
EOSINOPHIL NFR BLD: 3 %
GFR SERPLBLD CREATININE-BSD FMLA CKD-EPI: 18 ML/MIN/{1.73_M2}
GLUCOSE SERPL-MCNC: 97 MG/DL (ref 70–99)
HCT VFR BLD AUTO: 38.6 % (ref 36–48)
HGB BLD-MCNC: 12.7 G/DL (ref 12–16)
LYMPHOCYTES # BLD: 2.3 K/UL (ref 1–5.1)
LYMPHOCYTES NFR BLD: 24.2 %
MCH RBC QN AUTO: 30.9 PG (ref 26–34)
MCHC RBC AUTO-ENTMCNC: 32.9 G/DL (ref 31–36)
MCV RBC AUTO: 93.9 FL (ref 80–100)
MONOCYTES # BLD: 1 K/UL (ref 0–1.3)
MONOCYTES NFR BLD: 10.2 %
NEUTROPHILS # BLD: 5.9 K/UL (ref 1.7–7.7)
NEUTROPHILS NFR BLD: 61.3 %
NT-PROBNP SERPL-MCNC: ABNORMAL PG/ML (ref 0–449)
PHOSPHATE SERPL-MCNC: 3.7 MG/DL (ref 2.5–4.9)
PLATELET # BLD AUTO: 206 K/UL (ref 135–450)
PMV BLD AUTO: 9.3 FL (ref 5–10.5)
POTASSIUM SERPL-SCNC: 4 MMOL/L (ref 3.5–5.1)
PTH-INTACT SERPL-MCNC: 74.2 PG/ML (ref 14–72)
RBC # BLD AUTO: 4.11 M/UL (ref 4–5.2)
SODIUM SERPL-SCNC: 138 MMOL/L (ref 136–145)
WBC # BLD AUTO: 9.6 K/UL (ref 4–11)

## 2023-09-28 PROCEDURE — 93294 REM INTERROG EVL PM/LDLS PM: CPT | Performed by: INTERNAL MEDICINE

## 2023-09-28 PROCEDURE — 93296 REM INTERROG EVL PM/IDS: CPT | Performed by: INTERNAL MEDICINE

## 2023-11-16 NOTE — PROGRESS NOTES
[facility]   Electrophysiology  Office Visit  Date: 11/28/2023    Chief Complaint   Patient presents with    Bradycardia    Congestive Heart Failure    Obesity    Hypertension     Cardiac HX: Juliette Russo is a 78 y.o. woman with a h/o HTN, CKD stage III, obesity, chronic dCHF, DVT (10/2020), on Eliquis, who p/w fatigue, lightheadedness and LOC (7/2019), found to have bradycardia and type II AV block, temp PPM placed, BB held and AVB/bradycardia persisted, s/p dual chamber MDT PPM (7/16/19, Dr. Calla Bloch). Interval History/HPI: Patient is here for follow-up for bradycardia, high degree AV block and PPM management. Patient was originally seen in 2019 when she presented with dizziness and lightheadedness. She was found to have sinus bradycardia with a type II AV block for which she was symptomatic. She had a temporary pacemaker placed and her beta-blocker was placed on hold. She continued to have persistent AV block along with bradycardia and underwent a dual-chamber MDT PPM on 7/16/2019. The patient's last echo was in July 2019 her EF was 55 to 60%. She also had grade 2 diastolic dysfunction at that time. Patient does have a high ventricular pacing burden and has repeatedly had an echo suggested. She is not interested in having any further testing or procedures done. Today she presents in NSR with a heart rate of 88 bpm.  Review of her device today shows 50.8% AP, 99.8% , no arrhythmias, other parameters stable. We had a long discussion in the office with the patient, her  and her son Anahi Barrientos regarding her high ventricular pacing burden. I have recommended an echo to be done to assess her EF with a high  burden. The patient is not interested in having much done. She did have a bad experience during her last echo during the check-in and is not interested in going back to [facility] to have an echo done.   Her son is asking what benefit this would be to her quality of life as she is

## 2023-11-28 ENCOUNTER — OFFICE VISIT (OUTPATIENT)
Dept: CARDIOLOGY CLINIC | Age: 83
End: 2023-11-28
Payer: MEDICARE

## 2023-11-28 ENCOUNTER — NURSE ONLY (OUTPATIENT)
Dept: CARDIOLOGY CLINIC | Age: 83
End: 2023-11-28

## 2023-11-28 VITALS
DIASTOLIC BLOOD PRESSURE: 74 MMHG | SYSTOLIC BLOOD PRESSURE: 128 MMHG | WEIGHT: 183 LBS | BODY MASS INDEX: 33.47 KG/M2 | HEART RATE: 80 BPM

## 2023-11-28 DIAGNOSIS — I10 ESSENTIAL HYPERTENSION: ICD-10-CM

## 2023-11-28 DIAGNOSIS — I44.39 HIGH DEGREE ATRIOVENTRICULAR BLOCK: Primary | ICD-10-CM

## 2023-11-28 DIAGNOSIS — R00.1 SINUS BRADYCARDIA: ICD-10-CM

## 2023-11-28 DIAGNOSIS — I44.2 COMPLETE HEART BLOCK (HCC): ICD-10-CM

## 2023-11-28 DIAGNOSIS — I50.32 CHRONIC DIASTOLIC CHF (CONGESTIVE HEART FAILURE) (HCC): ICD-10-CM

## 2023-11-28 DIAGNOSIS — I49.5 SSS (SICK SINUS SYNDROME) (HCC): ICD-10-CM

## 2023-11-28 DIAGNOSIS — Z95.0 PACEMAKER: Primary | ICD-10-CM

## 2023-11-28 DIAGNOSIS — Z95.0 PACEMAKER: ICD-10-CM

## 2023-11-28 PROCEDURE — 93000 ELECTROCARDIOGRAM COMPLETE: CPT | Performed by: NURSE PRACTITIONER

## 2023-11-28 PROCEDURE — 1123F ACP DISCUSS/DSCN MKR DOCD: CPT | Performed by: NURSE PRACTITIONER

## 2023-11-28 PROCEDURE — 99215 OFFICE O/P EST HI 40 MIN: CPT | Performed by: NURSE PRACTITIONER

## 2023-11-28 PROCEDURE — G8484 FLU IMMUNIZE NO ADMIN: HCPCS | Performed by: NURSE PRACTITIONER

## 2023-11-28 PROCEDURE — 1090F PRES/ABSN URINE INCON ASSESS: CPT | Performed by: NURSE PRACTITIONER

## 2023-11-28 PROCEDURE — 1036F TOBACCO NON-USER: CPT | Performed by: NURSE PRACTITIONER

## 2023-11-28 PROCEDURE — 3074F SYST BP LT 130 MM HG: CPT | Performed by: NURSE PRACTITIONER

## 2023-11-28 PROCEDURE — G8427 DOCREV CUR MEDS BY ELIG CLIN: HCPCS | Performed by: NURSE PRACTITIONER

## 2023-11-28 PROCEDURE — 3078F DIAST BP <80 MM HG: CPT | Performed by: NURSE PRACTITIONER

## 2023-11-28 PROCEDURE — G8400 PT W/DXA NO RESULTS DOC: HCPCS | Performed by: NURSE PRACTITIONER

## 2023-11-28 PROCEDURE — G8417 CALC BMI ABV UP PARAM F/U: HCPCS | Performed by: NURSE PRACTITIONER

## 2023-12-28 PROCEDURE — 93296 REM INTERROG EVL PM/IDS: CPT | Performed by: INTERNAL MEDICINE

## 2023-12-28 PROCEDURE — 93294 REM INTERROG EVL PM/LDLS PM: CPT | Performed by: INTERNAL MEDICINE

## 2024-02-21 ENCOUNTER — OFFICE VISIT (OUTPATIENT)
Dept: CARDIOLOGY CLINIC | Age: 84
End: 2024-02-21
Payer: MEDICARE

## 2024-02-21 VITALS — SYSTOLIC BLOOD PRESSURE: 118 MMHG | HEART RATE: 68 BPM | DIASTOLIC BLOOD PRESSURE: 60 MMHG

## 2024-02-21 DIAGNOSIS — I50.32 CHRONIC DIASTOLIC CONGESTIVE HEART FAILURE (HCC): Primary | ICD-10-CM

## 2024-02-21 DIAGNOSIS — Z95.0 PACEMAKER: ICD-10-CM

## 2024-02-21 DIAGNOSIS — I45.9 HB (HEART BLOCK): ICD-10-CM

## 2024-02-21 DIAGNOSIS — Z86.718 HISTORY OF DEEP VEIN THROMBOSIS: ICD-10-CM

## 2024-02-21 DIAGNOSIS — I10 ESSENTIAL HYPERTENSION: ICD-10-CM

## 2024-02-21 DIAGNOSIS — I51.89 DIASTOLIC DYSFUNCTION: ICD-10-CM

## 2024-02-21 PROCEDURE — 1090F PRES/ABSN URINE INCON ASSESS: CPT | Performed by: INTERNAL MEDICINE

## 2024-02-21 PROCEDURE — 3078F DIAST BP <80 MM HG: CPT | Performed by: INTERNAL MEDICINE

## 2024-02-21 PROCEDURE — G8400 PT W/DXA NO RESULTS DOC: HCPCS | Performed by: INTERNAL MEDICINE

## 2024-02-21 PROCEDURE — G8484 FLU IMMUNIZE NO ADMIN: HCPCS | Performed by: INTERNAL MEDICINE

## 2024-02-21 PROCEDURE — G8428 CUR MEDS NOT DOCUMENT: HCPCS | Performed by: INTERNAL MEDICINE

## 2024-02-21 PROCEDURE — 1036F TOBACCO NON-USER: CPT | Performed by: INTERNAL MEDICINE

## 2024-02-21 PROCEDURE — G8417 CALC BMI ABV UP PARAM F/U: HCPCS | Performed by: INTERNAL MEDICINE

## 2024-02-21 PROCEDURE — 99214 OFFICE O/P EST MOD 30 MIN: CPT | Performed by: INTERNAL MEDICINE

## 2024-02-21 PROCEDURE — 3074F SYST BP LT 130 MM HG: CPT | Performed by: INTERNAL MEDICINE

## 2024-02-21 PROCEDURE — 1123F ACP DISCUSS/DSCN MKR DOCD: CPT | Performed by: INTERNAL MEDICINE

## 2024-02-21 ASSESSMENT — ENCOUNTER SYMPTOMS
CHOKING: 0
CHEST TIGHTNESS: 0
SHORTNESS OF BREATH: 0
COUGH: 0

## 2024-02-21 NOTE — PROGRESS NOTES
Subjective:      Patient ID: Allegra Reina is a 83 y.o. female.    Here for follow up CHF/diastolic dysfunction/HTN.   DVT/HB/pacemaker.  On AC. No bleeding issues.  Edema stable.   Wt down.  No sob.  No pnd.  No orthopnea.  No chest pain.  No tachycardia/syncope. BP good at home.     Past Medical History:   Diagnosis Date    CHF (congestive heart failure) (HCC)     Hypertension      Past Surgical History:   Procedure Laterality Date    CHOLECYSTECTOMY      HYSTERECTOMY (CERVIX STATUS UNKNOWN)       Social History     Socioeconomic History    Marital status:      Spouse name: Not on file    Number of children: Not on file    Years of education: Not on file    Highest education level: Not on file   Occupational History    Not on file   Tobacco Use    Smoking status: Never    Smokeless tobacco: Never   Substance and Sexual Activity    Alcohol use: No    Drug use: No    Sexual activity: Not Currently   Other Topics Concern    Not on file   Social History Narrative    Not on file     Social Determinants of Health     Financial Resource Strain: Not on file   Food Insecurity: Not on file   Transportation Needs: Not on file   Physical Activity: Not on file   Stress: Not on file   Social Connections: Not on file   Intimate Partner Violence: Not on file   Housing Stability: Not on file     FH reviewed personally with pt, denies FH cardiac issues        Vitals:    02/21/24 1328   BP: 118/60   Pulse: 68           wt unable, previously 190      Review of Systems   Constitutional:  Negative for activity change, appetite change and fatigue.   Respiratory:  Negative for cough, choking, chest tightness and shortness of breath.    Cardiovascular:  Negative for chest pain, palpitations and leg swelling.        Denies PND or orthopnea. No tachycardia or syncope.    Neurological:  Negative for dizziness, syncope and light-headedness.   Psychiatric/Behavioral:  Negative for agitation, behavioral problems and confusion.

## 2024-06-13 NOTE — PROGRESS NOTES
(MAGNESIUM-OXIDE) 250 MG TABS tablet Take 1 tablet by mouth daily   Yes Provider, Historical, MD       Social History:   reports that she has never smoked. She has never used smokeless tobacco. She reports that she does not drink alcohol and does not use drugs.        Family History:  family history includes Stroke in her mother.   Reviewed. Denies family history of sudden cardiac death, arrhythmia, premature CAD    Review of System:    General ROS: negative for - chills, fever   Psychological ROS: negative for - anxiety or depression  Ophthalmic ROS: negative for - eye pain or loss of vision  ENT ROS: negative for - epistaxis, headaches, nasal discharge, sore throat   Allergy and Immunology ROS: negative for - hives, nasal congestion   Hematological and Lymphatic ROS: negative for - bleeding problems, blood clots, bruising or jaundice  Endocrine ROS: negative for - skin changes, temperature intolerance or unexpected weight changes  Respiratory ROS: negative for - cough, hemoptysis, pleuritic pain, SOB, sputum changes or wheezing  Cardiovascular ROS: Per HPI.   Gastrointestinal ROS: negative for - abdominal pain, blood in stools, diarrhea, hematemesis, melena, nausea/vomiting or swallowing difficulty/pain  Genito-Urinary ROS: negative for - dysuria or incontinence  Musculoskeletal ROS: negative for - joint swelling or muscle pain  Neurological ROS: negative for - confusion, dizziness, gait disturbance, headaches, numbness/tingling, seizures, speech problems, tremors, visual changes or weakness  Dermatological ROS: negative for - rash    Physical Examination:  Vitals:    06/27/24 1408   BP: 128/70   Pulse: 84       Constitutional: Oriented. No distress.   Head: Normocephalic and atraumatic.   Mouth/Throat: Oropharynx is clear and moist.   Eyes: Conjunctivae normal. EOM are normal.   Neck: Normal range of motion. Neck supple. No rigidity.  No JVD present.   Cardiovascular: Normal rate, regular rhythm, S1&S2 and intact

## 2024-06-27 ENCOUNTER — NURSE ONLY (OUTPATIENT)
Dept: CARDIOLOGY CLINIC | Age: 84
End: 2024-06-27
Payer: MEDICARE

## 2024-06-27 ENCOUNTER — OFFICE VISIT (OUTPATIENT)
Dept: CARDIOLOGY CLINIC | Age: 84
End: 2024-06-27

## 2024-06-27 VITALS — DIASTOLIC BLOOD PRESSURE: 70 MMHG | SYSTOLIC BLOOD PRESSURE: 128 MMHG | HEART RATE: 84 BPM

## 2024-06-27 DIAGNOSIS — Z95.0 PACEMAKER: Primary | ICD-10-CM

## 2024-06-27 DIAGNOSIS — I45.9 HB (HEART BLOCK): Primary | ICD-10-CM

## 2024-06-27 DIAGNOSIS — I44.2 COMPLETE HEART BLOCK (HCC): ICD-10-CM

## 2024-06-27 DIAGNOSIS — I50.32 CHRONIC DIASTOLIC CONGESTIVE HEART FAILURE (HCC): ICD-10-CM

## 2024-06-27 DIAGNOSIS — I10 ESSENTIAL HYPERTENSION: ICD-10-CM

## 2024-06-27 DIAGNOSIS — Z86.718 HISTORY OF DEEP VEIN THROMBOSIS: ICD-10-CM

## 2024-06-27 DIAGNOSIS — Z95.0 PACEMAKER: ICD-10-CM

## 2024-06-27 PROCEDURE — 93280 PM DEVICE PROGR EVAL DUAL: CPT | Performed by: INTERNAL MEDICINE

## 2024-07-18 DIAGNOSIS — G89.29 OTHER CHRONIC PAIN: ICD-10-CM

## 2024-07-18 RX ORDER — TRAMADOL HYDROCHLORIDE 50 MG/1
50 TABLET ORAL 2 TIMES DAILY
Qty: 60 TABLET | Refills: 0 | Status: SHIPPED | OUTPATIENT
Start: 2024-07-18 | End: 2024-09-16

## 2024-08-28 ENCOUNTER — OFFICE VISIT (OUTPATIENT)
Dept: CARDIOLOGY CLINIC | Age: 84
End: 2024-08-28
Payer: MEDICARE

## 2024-08-28 VITALS
HEART RATE: 67 BPM | SYSTOLIC BLOOD PRESSURE: 130 MMHG | BODY MASS INDEX: 31.09 KG/M2 | DIASTOLIC BLOOD PRESSURE: 70 MMHG | WEIGHT: 170 LBS

## 2024-08-28 DIAGNOSIS — R01.1 MURMUR, CARDIAC: ICD-10-CM

## 2024-08-28 DIAGNOSIS — I50.32 CHRONIC DIASTOLIC CONGESTIVE HEART FAILURE (HCC): Primary | ICD-10-CM

## 2024-08-28 PROCEDURE — 3078F DIAST BP <80 MM HG: CPT | Performed by: INTERNAL MEDICINE

## 2024-08-28 PROCEDURE — 3075F SYST BP GE 130 - 139MM HG: CPT | Performed by: INTERNAL MEDICINE

## 2024-08-28 PROCEDURE — G8400 PT W/DXA NO RESULTS DOC: HCPCS | Performed by: INTERNAL MEDICINE

## 2024-08-28 PROCEDURE — 1090F PRES/ABSN URINE INCON ASSESS: CPT | Performed by: INTERNAL MEDICINE

## 2024-08-28 PROCEDURE — 1036F TOBACCO NON-USER: CPT | Performed by: INTERNAL MEDICINE

## 2024-08-28 PROCEDURE — 99215 OFFICE O/P EST HI 40 MIN: CPT | Performed by: INTERNAL MEDICINE

## 2024-08-28 PROCEDURE — 1123F ACP DISCUSS/DSCN MKR DOCD: CPT | Performed by: INTERNAL MEDICINE

## 2024-08-28 PROCEDURE — G8417 CALC BMI ABV UP PARAM F/U: HCPCS | Performed by: INTERNAL MEDICINE

## 2024-08-28 PROCEDURE — G8427 DOCREV CUR MEDS BY ELIG CLIN: HCPCS | Performed by: INTERNAL MEDICINE

## 2024-08-29 PROBLEM — R01.1 MURMUR, CARDIAC: Status: ACTIVE | Noted: 2024-08-29

## 2024-08-29 NOTE — PROGRESS NOTES
Hctz [hydrochlorothiazide], Isoptin [verapamil], Keflex [cephalexin], Lorazepam, Lyrica [pregabalin], Meclomen [meclofenamate], Morphine, Nabumetone, Nucynta [tapentadol], Ofloxacin, Peanut butter flavor, Percocet [oxycodone-acetaminophen], Premarin [conjugated estrogens], Prinivil [lisinopril], Seldane [terfenadine], Strawberry flavor, Sulfa antibiotics, Tagamet [cimetidine], Temazepam, Terbinafine and related, Tetracyclines & related, Trazodone and nefazodone, Versed [midazolam], Voltaren [diclofenac sodium], Zanaflex [tizanidine hcl], Amoxicillin, and Penicillins       Review of Systems:     All 12 point review of symptoms completed. Pertinent positives identified in the HPI, all other review of symptoms negative as below.    CONSTITUTIONAL: No fatigue  SKIN: No rash or pruritis.  EYES: No visual changes or diplopia. No scleral icterus.  ENT: No Headaches, hearing loss or vertigo. No mouth sores or sore throat.  CARDIOVASCULAR: No chest pain/chest pressure/chest discomfort. No palpitations. No edema.   RESPIRATORY: No dyspnea. No cough or wheezing, no sputum production.  GASTROINTESTINAL: No N/V/D. No abdominal pain, appetite loss, blood in stools.  GENITOURINARY: No dysuria, trouble voiding, or hematuria.  MUSCULOSKELETAL:  No gait disturbance, weakness or joint complaints.  NEUROLOGICAL: No headache, diplopia, change in muscle strength, numbness or tingling. No change in gait, balance, coordination, mood, affect, memory, mentation, behavior.  PSHYCH: No anxiety, loss of interest, change in sexual behavior, feelings of self-harm, or confusion.  ENDOCRINE: No excessive thirst, fluid intake, or urination. No tremor.  HEMATOLOGIC: No abnormal bruising or bleeding.  ALLERGY: No nasal congestion or hives.      Physical Examination:     Vitals:    08/28/24 1324   BP: 130/70   Pulse: 67   Weight: 77.1 kg (170 lb)       Wt Readings from Last 3 Encounters:   08/28/24 77.1 kg (170 lb)   08/01/24 77.6 kg (171 lb)    11/28/23 83 kg (183 lb)         General Appearance:  Alert, cooperative, no distress, appears stated age Appropriate weight   Head:  Normocephalic, without obvious abnormality, atraumatic   Neck: Supple, symmetrical, trachea midline, no adenopathy, thyroid: not enlarged, symmetric, no tenderness/mass/nodules, no carotid bruit   Lungs:   Clear to auscultation bilaterally, respirations unlabored   Chest Wall:  No tenderness or deformity   Heart:  Regular rhythm, rate is controlled, S1, S2 normal, there is III/VI ELENA at RUSB, there is no rub or gallop, cannot assess jvd, no bilateral lower extremity edema   Abdomen:   Soft, non-tender, bowel sounds active all four quadrants,  no masses, no organomegaly       Extremities: Extremities normal, atraumatic, no cyanosis   Pulses: 2+ and symmetric   Skin: Skin color, texture, turgor normal, no rashes or lesions   Pysch: Normal mood and affect   Neurologic: Normal gross motor and sensory exam.  Cranial nerves intact        Labs:     Lab Results   Component Value Date    WBC 9.2 07/25/2024    HGB 12.7 07/25/2024    HCT 38.1 07/25/2024    MCV 94.9 07/25/2024     07/25/2024     Lab Results   Component Value Date     07/25/2024    K 4.2 07/25/2024    CL 98 (L) 07/25/2024    CO2 25 07/25/2024    BUN 50 (H) 07/25/2024    CREATININE 3.2 (H) 07/25/2024    GLUCOSE 82 07/25/2024    CALCIUM 10.6 07/25/2024    BILITOT 0.3 10/02/2017    ALKPHOS 52 10/02/2017    AST 21 10/02/2017    ALT 13 10/02/2017    LABGLOM 14 (A) 07/25/2024    GFRAA 22 (A) 08/19/2022    AGRATIO 0.9 (L) 10/02/2017    GLOB 3.6 10/02/2017         Lab Results   Component Value Date    CHOL 182 03/08/2024     Lab Results   Component Value Date    TRIG 101 03/08/2024     Lab Results   Component Value Date    HDL 39 (L) 03/08/2024     No components found for: \"LDLCHOLESTEROL\", \"LDLCALC\"  Lab Results   Component Value Date    VLDL 20 03/08/2024     No results found for: \"CHOLHDLRATIO\"    Lab Results   Component

## 2024-08-30 ENCOUNTER — HOSPITAL ENCOUNTER (INPATIENT)
Age: 84
LOS: 6 days | Discharge: NURSING, REHAB OR LONG-TERM CARE | DRG: 291 | End: 2024-09-05
Attending: EMERGENCY MEDICINE | Admitting: INTERNAL MEDICINE
Payer: MEDICARE

## 2024-08-30 ENCOUNTER — APPOINTMENT (OUTPATIENT)
Dept: GENERAL RADIOLOGY | Age: 84
DRG: 291 | End: 2024-08-30
Payer: MEDICARE

## 2024-08-30 DIAGNOSIS — E87.70 HYPERVOLEMIA, UNSPECIFIED HYPERVOLEMIA TYPE: Primary | ICD-10-CM

## 2024-08-30 DIAGNOSIS — I50.9 CONGESTIVE HEART FAILURE, UNSPECIFIED HF CHRONICITY, UNSPECIFIED HEART FAILURE TYPE (HCC): ICD-10-CM

## 2024-08-30 DIAGNOSIS — N30.00 ACUTE CYSTITIS WITHOUT HEMATURIA: ICD-10-CM

## 2024-08-30 DIAGNOSIS — R01.1 HEART MURMUR: ICD-10-CM

## 2024-08-30 DIAGNOSIS — I35.0 NONRHEUMATIC AORTIC VALVE STENOSIS: ICD-10-CM

## 2024-08-30 PROBLEM — I50.43 CHF (CONGESTIVE HEART FAILURE), NYHA CLASS I, ACUTE ON CHRONIC, COMBINED (HCC): Status: ACTIVE | Noted: 2024-08-30

## 2024-08-30 LAB
ANION GAP SERPL CALCULATED.3IONS-SCNC: 11 MMOL/L (ref 3–16)
BACTERIA URNS QL MICRO: ABNORMAL /HPF
BASOPHILS # BLD: 0 K/UL (ref 0–0.2)
BASOPHILS NFR BLD: 0.7 %
BILIRUB UR QL STRIP.AUTO: NEGATIVE
BUN SERPL-MCNC: 40 MG/DL (ref 7–20)
CALCIUM SERPL-MCNC: 9.4 MG/DL (ref 8.3–10.6)
CHLORIDE SERPL-SCNC: 102 MMOL/L (ref 99–110)
CLARITY UR: CLEAR
CO2 SERPL-SCNC: 24 MMOL/L (ref 21–32)
COLOR UR: YELLOW
CREAT SERPL-MCNC: 2.4 MG/DL (ref 0.6–1.2)
DEPRECATED RDW RBC AUTO: 13.1 % (ref 12.4–15.4)
EKG ATRIAL RATE: 85 BPM
EKG DIAGNOSIS: NORMAL
EKG P AXIS: 18 DEGREES
EKG P-R INTERVAL: 230 MS
EKG Q-T INTERVAL: 472 MS
EKG QRS DURATION: 206 MS
EKG QTC CALCULATION (BAZETT): 561 MS
EKG R AXIS: -65 DEGREES
EKG T AXIS: 101 DEGREES
EKG VENTRICULAR RATE: 85 BPM
EOSINOPHIL # BLD: 0.3 K/UL (ref 0–0.6)
EOSINOPHIL NFR BLD: 4.8 %
EPI CELLS #/AREA URNS HPF: ABNORMAL /HPF (ref 0–5)
FLUAV RNA RESP QL NAA+PROBE: NOT DETECTED
FLUBV RNA RESP QL NAA+PROBE: NOT DETECTED
GFR SERPLBLD CREATININE-BSD FMLA CKD-EPI: 19 ML/MIN/{1.73_M2}
GLUCOSE SERPL-MCNC: 110 MG/DL (ref 70–99)
GLUCOSE UR STRIP.AUTO-MCNC: NEGATIVE MG/DL
HCT VFR BLD AUTO: 37.4 % (ref 36–48)
HGB BLD-MCNC: 12.3 G/DL (ref 12–16)
HGB UR QL STRIP.AUTO: ABNORMAL
KETONES UR STRIP.AUTO-MCNC: NEGATIVE MG/DL
LEUKOCYTE ESTERASE UR QL STRIP.AUTO: ABNORMAL
LYMPHOCYTES # BLD: 1.6 K/UL (ref 1–5.1)
LYMPHOCYTES NFR BLD: 23.5 %
MCH RBC QN AUTO: 31.1 PG (ref 26–34)
MCHC RBC AUTO-ENTMCNC: 32.8 G/DL (ref 31–36)
MCV RBC AUTO: 94.6 FL (ref 80–100)
MONOCYTES # BLD: 0.6 K/UL (ref 0–1.3)
MONOCYTES NFR BLD: 8.1 %
NEUTROPHILS # BLD: 4.4 K/UL (ref 1.7–7.7)
NEUTROPHILS NFR BLD: 62.9 %
NITRITE UR QL STRIP.AUTO: NEGATIVE
NT-PROBNP SERPL-MCNC: ABNORMAL PG/ML (ref 0–449)
PH UR STRIP.AUTO: 6.5 [PH] (ref 5–8)
PLATELET # BLD AUTO: 189 K/UL (ref 135–450)
PMV BLD AUTO: 9.7 FL (ref 5–10.5)
POTASSIUM SERPL-SCNC: 4.4 MMOL/L (ref 3.5–5.1)
PROT UR STRIP.AUTO-MCNC: 30 MG/DL
RBC # BLD AUTO: 3.96 M/UL (ref 4–5.2)
RBC #/AREA URNS HPF: ABNORMAL /HPF (ref 0–4)
SARS-COV-2 RNA RESP QL NAA+PROBE: NOT DETECTED
SODIUM SERPL-SCNC: 137 MMOL/L (ref 136–145)
SP GR UR STRIP.AUTO: 1.02 (ref 1–1.03)
TROPONIN, HIGH SENSITIVITY: 68 NG/L (ref 0–14)
TROPONIN, HIGH SENSITIVITY: 82 NG/L (ref 0–14)
UA COMPLETE W REFLEX CULTURE PNL UR: YES
UA DIPSTICK W REFLEX MICRO PNL UR: YES
URN SPEC COLLECT METH UR: ABNORMAL
UROBILINOGEN UR STRIP-ACNC: 0.2 E.U./DL
WBC # BLD AUTO: 6.9 K/UL (ref 4–11)
WBC #/AREA URNS HPF: ABNORMAL /HPF (ref 0–5)

## 2024-08-30 PROCEDURE — 93005 ELECTROCARDIOGRAM TRACING: CPT

## 2024-08-30 PROCEDURE — 84484 ASSAY OF TROPONIN QUANT: CPT

## 2024-08-30 PROCEDURE — 81001 URINALYSIS AUTO W/SCOPE: CPT

## 2024-08-30 PROCEDURE — 96374 THER/PROPH/DIAG INJ IV PUSH: CPT

## 2024-08-30 PROCEDURE — 71045 X-RAY EXAM CHEST 1 VIEW: CPT

## 2024-08-30 PROCEDURE — 83880 ASSAY OF NATRIURETIC PEPTIDE: CPT

## 2024-08-30 PROCEDURE — 6370000000 HC RX 637 (ALT 250 FOR IP)

## 2024-08-30 PROCEDURE — 36415 COLL VENOUS BLD VENIPUNCTURE: CPT

## 2024-08-30 PROCEDURE — 85025 COMPLETE CBC W/AUTO DIFF WBC: CPT

## 2024-08-30 PROCEDURE — 80048 BASIC METABOLIC PNL TOTAL CA: CPT

## 2024-08-30 PROCEDURE — 87086 URINE CULTURE/COLONY COUNT: CPT

## 2024-08-30 PROCEDURE — 2580000003 HC RX 258

## 2024-08-30 PROCEDURE — 6360000002 HC RX W HCPCS

## 2024-08-30 PROCEDURE — 99285 EMERGENCY DEPT VISIT HI MDM: CPT

## 2024-08-30 PROCEDURE — 1200000000 HC SEMI PRIVATE

## 2024-08-30 PROCEDURE — 87636 SARSCOV2 & INF A&B AMP PRB: CPT

## 2024-08-30 RX ORDER — ACETAMINOPHEN 650 MG/1
650 SUPPOSITORY RECTAL EVERY 6 HOURS PRN
Status: DISCONTINUED | OUTPATIENT
Start: 2024-08-30 | End: 2024-09-01

## 2024-08-30 RX ORDER — SODIUM CHLORIDE 9 MG/ML
INJECTION, SOLUTION INTRAVENOUS PRN
Status: DISCONTINUED | OUTPATIENT
Start: 2024-08-30 | End: 2024-09-05 | Stop reason: HOSPADM

## 2024-08-30 RX ORDER — ACETAMINOPHEN 325 MG/1
650 TABLET ORAL ONCE
Status: COMPLETED | OUTPATIENT
Start: 2024-08-30 | End: 2024-08-30

## 2024-08-30 RX ORDER — CLOTRIMAZOLE AND BETAMETHASONE DIPROPIONATE 10; .64 MG/G; MG/G
CREAM TOPICAL 2 TIMES DAILY PRN
COMMUNITY

## 2024-08-30 RX ORDER — TRAMADOL HYDROCHLORIDE 50 MG/1
50 TABLET ORAL EVERY 6 HOURS PRN
Status: DISCONTINUED | OUTPATIENT
Start: 2024-08-30 | End: 2024-08-30

## 2024-08-30 RX ORDER — ONDANSETRON 4 MG/1
4 TABLET, ORALLY DISINTEGRATING ORAL EVERY 8 HOURS PRN
Status: DISCONTINUED | OUTPATIENT
Start: 2024-08-30 | End: 2024-09-05 | Stop reason: HOSPADM

## 2024-08-30 RX ORDER — TRAMADOL HYDROCHLORIDE 50 MG/1
50 TABLET ORAL ONCE
Status: COMPLETED | OUTPATIENT
Start: 2024-08-30 | End: 2024-08-30

## 2024-08-30 RX ORDER — ONDANSETRON 2 MG/ML
4 INJECTION INTRAMUSCULAR; INTRAVENOUS EVERY 6 HOURS PRN
Status: DISCONTINUED | OUTPATIENT
Start: 2024-08-30 | End: 2024-09-05 | Stop reason: HOSPADM

## 2024-08-30 RX ORDER — ACETAMINOPHEN 325 MG/1
650 TABLET ORAL EVERY 6 HOURS PRN
Status: DISCONTINUED | OUTPATIENT
Start: 2024-08-30 | End: 2024-09-01

## 2024-08-30 RX ORDER — SODIUM CHLORIDE 0.9 % (FLUSH) 0.9 %
5-40 SYRINGE (ML) INJECTION EVERY 12 HOURS SCHEDULED
Status: DISCONTINUED | OUTPATIENT
Start: 2024-08-30 | End: 2024-09-05 | Stop reason: HOSPADM

## 2024-08-30 RX ORDER — SODIUM CHLORIDE 0.9 % (FLUSH) 0.9 %
5-40 SYRINGE (ML) INJECTION PRN
Status: DISCONTINUED | OUTPATIENT
Start: 2024-08-30 | End: 2024-09-05 | Stop reason: HOSPADM

## 2024-08-30 RX ORDER — ISOSORBIDE MONONITRATE 30 MG/1
30 TABLET, EXTENDED RELEASE ORAL DAILY
Status: DISCONTINUED | OUTPATIENT
Start: 2024-08-31 | End: 2024-09-05 | Stop reason: HOSPADM

## 2024-08-30 RX ORDER — FUROSEMIDE 10 MG/ML
40 INJECTION INTRAMUSCULAR; INTRAVENOUS 2 TIMES DAILY
Status: DISCONTINUED | OUTPATIENT
Start: 2024-08-31 | End: 2024-08-31

## 2024-08-30 RX ORDER — SENNOSIDES A AND B 8.6 MG/1
1 TABLET, FILM COATED ORAL DAILY PRN
Status: DISCONTINUED | OUTPATIENT
Start: 2024-08-30 | End: 2024-09-05 | Stop reason: HOSPADM

## 2024-08-30 RX ORDER — TRAMADOL HYDROCHLORIDE 50 MG/1
50 TABLET ORAL EVERY 12 HOURS PRN
Status: DISCONTINUED | OUTPATIENT
Start: 2024-08-30 | End: 2024-09-05 | Stop reason: HOSPADM

## 2024-08-30 RX ORDER — FUROSEMIDE 10 MG/ML
40 INJECTION INTRAMUSCULAR; INTRAVENOUS ONCE
Status: COMPLETED | OUTPATIENT
Start: 2024-08-30 | End: 2024-08-30

## 2024-08-30 RX ADMIN — APIXABAN 2.5 MG: 2.5 TABLET, FILM COATED ORAL at 23:24

## 2024-08-30 RX ADMIN — TRAMADOL HYDROCHLORIDE 50 MG: 50 TABLET ORAL at 23:45

## 2024-08-30 RX ADMIN — ACETAMINOPHEN 650 MG: 325 TABLET ORAL at 16:12

## 2024-08-30 RX ADMIN — SODIUM CHLORIDE, PRESERVATIVE FREE 10 ML: 5 INJECTION INTRAVENOUS at 23:46

## 2024-08-30 RX ADMIN — TRAMADOL HYDROCHLORIDE 50 MG: 50 TABLET ORAL at 16:12

## 2024-08-30 RX ADMIN — FUROSEMIDE 40 MG: 10 INJECTION, SOLUTION INTRAMUSCULAR; INTRAVENOUS at 20:06

## 2024-08-30 RX ADMIN — ACETAMINOPHEN 650 MG: 325 TABLET ORAL at 23:23

## 2024-08-30 ASSESSMENT — PAIN - FUNCTIONAL ASSESSMENT
PAIN_FUNCTIONAL_ASSESSMENT: 0-10
PAIN_FUNCTIONAL_ASSESSMENT: ACTIVITIES ARE NOT PREVENTED

## 2024-08-30 ASSESSMENT — PAIN DESCRIPTION - ORIENTATION: ORIENTATION: RIGHT;LEFT

## 2024-08-30 ASSESSMENT — PAIN SCALES - GENERAL
PAINLEVEL_OUTOF10: 6

## 2024-08-30 ASSESSMENT — PAIN DESCRIPTION - LOCATION: LOCATION: LEG

## 2024-08-30 ASSESSMENT — PAIN DESCRIPTION - DESCRIPTORS: DESCRIPTORS: DULL

## 2024-08-30 NOTE — ED TRIAGE NOTES
Pt arrives from home via ems for c/o bilateral lower leg pain and generalized weakness. Pt states she hasn't been able to get out of her chair for a couple of days.

## 2024-08-30 NOTE — ED PROVIDER NOTES
ED Attending Attestation Note     Date of evaluation: 8/30/2024    This patient was seen by the resident.  I have seen and examined the patient, agree with the workup, evaluation, management and diagnosis. The care plan has been discussed.  I have reviewed the ECG and concur with the resident's interpretation.      Briefly, Allegra Reina is a 83 y.o. female with a PMH inclusive of CKD, CAD, heart block status post pacemaker who presents for evaluation of generalized weakness, lower extremity edema and pain. Has not been able to walk. No trauma.    Notable exam findings include lower extremity edema.  Mild left extremity erythema    Assessment/ Medical Decision Making:     Will perform workup for etiology of symptoms.  Disposition pending workup and ability to ambulate.          Madan Amaya MD  08/31/24 2049    
Urinary Tract Infection     Order Specific Question:   UTI duration of therapy     Answer:   3 days    DISCONTD: atorvastatin (LIPITOR) tablet 10 mg    DISCONTD: aspirin chewable tablet 81 mg    torsemide (DEMADEX) 20 MG tablet     Sig: Take 1 tablet by mouth daily as needed (take 10 mg if weight is between 175-180 and full tablet if weight greater than 180 lbs) Takes based on weight. Holds if weight < 175 lbs. Takes 1/2 tablet if weight is between 175 - 180. Takes full tab if weight > 180 lbs    cinacalcet (SENSIPAR) 30 MG tablet     Sig: Take 1 tablet by mouth three times a week     Dispense:  30 tablet     Refill:  5    carvedilol (COREG) 6.25 MG tablet     Sig: Take 1 tablet by mouth 2 times daily (with meals)    hydrALAZINE (APRESOLINE) 25 MG tablet     Sig: Take 1 tablet by mouth every 8 hours    atorvastatin (LIPITOR) 10 MG tablet     Sig: Take 1 tablet by mouth daily    aspirin 81 MG chewable tablet     Sig: Take 1 tablet by mouth daily    cefUROXime (CEFTIN) 250 MG tablet     Sig: Take 1 tablet by mouth 2 times daily for 4 days       CONSULTS:  IP CONSULT TO CARDIOLOGY  IP CONSULT TO NEPHROLOGY  IP CONSULT TO PALLIATIVE CARE    Review of Systems     Review of Systems    Past Medical, Surgical, Family, and Social History     She has a past medical history of CHF (congestive heart failure) (HCC) and Hypertension.  She has a past surgical history that includes Hysterectomy and Cholecystectomy.  Her family history includes Stroke in her mother.  She reports that she has never smoked. She has never used smokeless tobacco. She reports that she does not drink alcohol and does not use drugs.    Medications     Discharge Medication List as of 9/5/2024  5:48 PM        CONTINUE these medications which have NOT CHANGED    Details   clotrimazole-betamethasone (LOTRISONE) 1-0.05 % cream Apply topically 2 times daily as needed Apply topically 2 times daily., Topical, 2 TIMES DAILY PRN, Historical Med      apixaban

## 2024-08-31 ENCOUNTER — APPOINTMENT (OUTPATIENT)
Age: 84
DRG: 291 | End: 2024-08-31
Attending: INTERNAL MEDICINE
Payer: MEDICARE

## 2024-08-31 PROBLEM — E87.70 HYPERVOLEMIA: Status: ACTIVE | Noted: 2024-08-31

## 2024-08-31 LAB
25(OH)D3 SERPL-MCNC: 40.2 NG/ML
ANION GAP SERPL CALCULATED.3IONS-SCNC: 13 MMOL/L (ref 3–16)
BASOPHILS # BLD: 0.1 K/UL (ref 0–0.2)
BASOPHILS NFR BLD: 0.7 %
BUN SERPL-MCNC: 40 MG/DL (ref 7–20)
CALCIUM SERPL-MCNC: 9.5 MG/DL (ref 8.3–10.6)
CHLORIDE SERPL-SCNC: 98 MMOL/L (ref 99–110)
CO2 SERPL-SCNC: 26 MMOL/L (ref 21–32)
CREAT SERPL-MCNC: 2.3 MG/DL (ref 0.6–1.2)
DEPRECATED RDW RBC AUTO: 13.3 % (ref 12.4–15.4)
EOSINOPHIL # BLD: 0.4 K/UL (ref 0–0.6)
EOSINOPHIL NFR BLD: 5.5 %
GFR SERPLBLD CREATININE-BSD FMLA CKD-EPI: 20 ML/MIN/{1.73_M2}
GLUCOSE SERPL-MCNC: 101 MG/DL (ref 70–99)
HCT VFR BLD AUTO: 36.4 % (ref 36–48)
HGB BLD-MCNC: 11.9 G/DL (ref 12–16)
LYMPHOCYTES # BLD: 2 K/UL (ref 1–5.1)
LYMPHOCYTES NFR BLD: 27.7 %
MAGNESIUM SERPL-MCNC: 2.2 MG/DL (ref 1.8–2.4)
MCH RBC QN AUTO: 31.3 PG (ref 26–34)
MCHC RBC AUTO-ENTMCNC: 32.8 G/DL (ref 31–36)
MCV RBC AUTO: 95.6 FL (ref 80–100)
MONOCYTES # BLD: 0.7 K/UL (ref 0–1.3)
MONOCYTES NFR BLD: 9.9 %
NEUTROPHILS # BLD: 4.1 K/UL (ref 1.7–7.7)
NEUTROPHILS NFR BLD: 56.2 %
PHOSPHATE SERPL-MCNC: 3.5 MG/DL (ref 2.5–4.9)
PLATELET # BLD AUTO: 168 K/UL (ref 135–450)
PMV BLD AUTO: 9.1 FL (ref 5–10.5)
POTASSIUM SERPL-SCNC: 4.3 MMOL/L (ref 3.5–5.1)
PTH-INTACT SERPL-MCNC: 70 PG/ML (ref 14–72)
RBC # BLD AUTO: 3.8 M/UL (ref 4–5.2)
SODIUM SERPL-SCNC: 137 MMOL/L (ref 136–145)
WBC # BLD AUTO: 7.2 K/UL (ref 4–11)

## 2024-08-31 PROCEDURE — 6370000000 HC RX 637 (ALT 250 FOR IP): Performed by: INTERNAL MEDICINE

## 2024-08-31 PROCEDURE — 1200000000 HC SEMI PRIVATE

## 2024-08-31 PROCEDURE — 85025 COMPLETE CBC W/AUTO DIFF WBC: CPT

## 2024-08-31 PROCEDURE — 6360000002 HC RX W HCPCS

## 2024-08-31 PROCEDURE — 99222 1ST HOSP IP/OBS MODERATE 55: CPT | Performed by: INTERNAL MEDICINE

## 2024-08-31 PROCEDURE — 2580000003 HC RX 258

## 2024-08-31 PROCEDURE — 83735 ASSAY OF MAGNESIUM: CPT

## 2024-08-31 PROCEDURE — 80048 BASIC METABOLIC PNL TOTAL CA: CPT

## 2024-08-31 PROCEDURE — 6370000000 HC RX 637 (ALT 250 FOR IP)

## 2024-08-31 PROCEDURE — 36415 COLL VENOUS BLD VENIPUNCTURE: CPT

## 2024-08-31 PROCEDURE — 82306 VITAMIN D 25 HYDROXY: CPT

## 2024-08-31 PROCEDURE — 92610 EVALUATE SWALLOWING FUNCTION: CPT

## 2024-08-31 PROCEDURE — 97535 SELF CARE MNGMENT TRAINING: CPT

## 2024-08-31 PROCEDURE — 84100 ASSAY OF PHOSPHORUS: CPT

## 2024-08-31 PROCEDURE — C8929 TTE W OR WO FOL WCON,DOPPLER: HCPCS

## 2024-08-31 PROCEDURE — 83970 ASSAY OF PARATHORMONE: CPT

## 2024-08-31 PROCEDURE — 97530 THERAPEUTIC ACTIVITIES: CPT

## 2024-08-31 PROCEDURE — 97166 OT EVAL MOD COMPLEX 45 MIN: CPT

## 2024-08-31 RX ORDER — HYDRALAZINE HYDROCHLORIDE 20 MG/ML
10 INJECTION INTRAMUSCULAR; INTRAVENOUS ONCE
Status: COMPLETED | OUTPATIENT
Start: 2024-08-31 | End: 2024-08-31

## 2024-08-31 RX ORDER — HYDRALAZINE HYDROCHLORIDE 25 MG/1
25 TABLET, FILM COATED ORAL EVERY 8 HOURS SCHEDULED
Status: DISCONTINUED | OUTPATIENT
Start: 2024-08-31 | End: 2024-09-01

## 2024-08-31 RX ORDER — FUROSEMIDE 10 MG/ML
40 INJECTION INTRAMUSCULAR; INTRAVENOUS DAILY
Status: DISCONTINUED | OUTPATIENT
Start: 2024-09-01 | End: 2024-09-01

## 2024-08-31 RX ADMIN — HYDRALAZINE HYDROCHLORIDE 25 MG: 25 TABLET ORAL at 09:54

## 2024-08-31 RX ADMIN — SENNOSIDES 8.6 MG: 8.6 TABLET, FILM COATED ORAL at 17:04

## 2024-08-31 RX ADMIN — ACETAMINOPHEN 650 MG: 325 TABLET ORAL at 09:59

## 2024-08-31 RX ADMIN — ACETAMINOPHEN 650 MG: 325 TABLET ORAL at 20:58

## 2024-08-31 RX ADMIN — TRAMADOL HYDROCHLORIDE 50 MG: 50 TABLET ORAL at 20:58

## 2024-08-31 RX ADMIN — APIXABAN 2.5 MG: 2.5 TABLET, FILM COATED ORAL at 09:29

## 2024-08-31 RX ADMIN — TRAMADOL HYDROCHLORIDE 50 MG: 50 TABLET ORAL at 10:04

## 2024-08-31 RX ADMIN — ACETAMINOPHEN 650 MG: 325 TABLET ORAL at 14:50

## 2024-08-31 RX ADMIN — SODIUM CHLORIDE, PRESERVATIVE FREE 10 ML: 5 INJECTION INTRAVENOUS at 09:53

## 2024-08-31 RX ADMIN — WATER 1000 MG: 1 INJECTION INTRAMUSCULAR; INTRAVENOUS; SUBCUTANEOUS at 13:00

## 2024-08-31 RX ADMIN — FUROSEMIDE 40 MG: 10 INJECTION, SOLUTION INTRAMUSCULAR; INTRAVENOUS at 06:05

## 2024-08-31 RX ADMIN — APIXABAN 2.5 MG: 2.5 TABLET, FILM COATED ORAL at 20:58

## 2024-08-31 RX ADMIN — HYDRALAZINE HYDROCHLORIDE 25 MG: 25 TABLET ORAL at 21:28

## 2024-08-31 RX ADMIN — SODIUM CHLORIDE, PRESERVATIVE FREE 10 ML: 5 INJECTION INTRAVENOUS at 21:28

## 2024-08-31 RX ADMIN — ISOSORBIDE MONONITRATE 30 MG: 30 TABLET, EXTENDED RELEASE ORAL at 09:29

## 2024-08-31 RX ADMIN — HYDRALAZINE HYDROCHLORIDE 10 MG: 20 INJECTION INTRAMUSCULAR; INTRAVENOUS at 04:20

## 2024-08-31 ASSESSMENT — PAIN DESCRIPTION - ORIENTATION
ORIENTATION: RIGHT;LEFT
ORIENTATION: RIGHT;LEFT
ORIENTATION: OTHER (COMMENT)
ORIENTATION: RIGHT;LEFT
ORIENTATION: LEFT;RIGHT

## 2024-08-31 ASSESSMENT — PAIN DESCRIPTION - LOCATION
LOCATION: LEG
LOCATION: OTHER (COMMENT)
LOCATION: LEG

## 2024-08-31 ASSESSMENT — PAIN SCALES - GENERAL
PAINLEVEL_OUTOF10: 5
PAINLEVEL_OUTOF10: 6
PAINLEVEL_OUTOF10: 5
PAINLEVEL_OUTOF10: 6
PAINLEVEL_OUTOF10: 6
PAINLEVEL_OUTOF10: 10
PAINLEVEL_OUTOF10: 5
PAINLEVEL_OUTOF10: 6
PAINLEVEL_OUTOF10: 9
PAINLEVEL_OUTOF10: 5
PAINLEVEL_OUTOF10: 5
PAINLEVEL_OUTOF10: 10
PAINLEVEL_OUTOF10: 5
PAINLEVEL_OUTOF10: 0
PAINLEVEL_OUTOF10: 8
PAINLEVEL_OUTOF10: 5
PAINLEVEL_OUTOF10: 0
PAINLEVEL_OUTOF10: 10
PAINLEVEL_OUTOF10: 9

## 2024-08-31 ASSESSMENT — PAIN DESCRIPTION - FREQUENCY: FREQUENCY: INTERMITTENT

## 2024-08-31 ASSESSMENT — PAIN SCALES - WONG BAKER
WONGBAKER_NUMERICALRESPONSE: HURTS A LITTLE BIT
WONGBAKER_NUMERICALRESPONSE: NO HURT
WONGBAKER_NUMERICALRESPONSE: HURTS A LITTLE BIT
WONGBAKER_NUMERICALRESPONSE: HURTS A LITTLE BIT

## 2024-08-31 ASSESSMENT — PAIN DESCRIPTION - PAIN TYPE: TYPE: ACUTE PAIN

## 2024-08-31 ASSESSMENT — PAIN DESCRIPTION - DESCRIPTORS
DESCRIPTORS: DULL
DESCRIPTORS: ACHING
DESCRIPTORS: ACHING
DESCRIPTORS: DISCOMFORT

## 2024-08-31 ASSESSMENT — PAIN - FUNCTIONAL ASSESSMENT
PAIN_FUNCTIONAL_ASSESSMENT: ACTIVITIES ARE NOT PREVENTED

## 2024-08-31 ASSESSMENT — PAIN DESCRIPTION - ONSET: ONSET: GRADUAL

## 2024-08-31 NOTE — H&P
Transportation (Medical): No     Lack of Transportation (Non-Medical): No    [facility]     Interpersonal Safety   Housing Stability: Low Risk  (8/30/2024)    Housing Stability Vital Sign     Unable to Pay for Housing in the Last Year: No     Number of Times Moved in the Last Year: 1     Homeless in the Last Year: No        FHx:      Problem Relation Age of Onset    Stroke Mother        ROS:  Review of Systems   Constitutional:  Positive for activity change and fatigue. Negative for appetite change, chills, diaphoresis, fever and unexpected weight change.   HENT: Negative.     Eyes: Negative.    Respiratory: Negative.     Cardiovascular: Negative.    Gastrointestinal: Negative.    Endocrine: Negative.    Genitourinary: Negative.    Musculoskeletal:         Pain in both lower extremities.   Skin: Negative.    Neurological:  Positive for facial asymmetry (h/o Bell's Palsy) and weakness. Negative for light-headedness, numbness and headaches.   Hematological:  Bruises/bleeds easily (on Eliquis).   Psychiatric/Behavioral: Negative.            Objective     VITAL SIGNS:  Patient Vitals for the past 8 hrs:   BP Temp Temp src Pulse Resp SpO2 Height Weight   08/31/24 0015 -- -- -- -- 17 -- -- --   08/30/24 2350 (!) 184/72 -- -- -- -- -- -- --   08/30/24 2338 -- -- -- -- -- -- 1.651 m (5' 5\") 77.6 kg (171 lb 1.2 oz)   08/30/24 2257 (!) 178/75 98.2 °F (36.8 °C) Oral 81 17 97 % -- --   08/30/24 2100 (!) 172/64 -- -- -- -- 97 % -- --   08/30/24 2032 (!) 184/73 -- -- -- 18 98 % -- --   08/30/24 1959 (!) 197/92 -- -- -- -- 95 % -- --   08/30/24 1902 (!) 179/86 -- -- -- -- 97 % -- --           PHYSICAL EXAM:    Physical Exam  Constitutional:       General: She is not in acute distress.     Appearance: She is obese. She is not ill-appearing.   HENT:      Head: Normocephalic and atraumatic.      Nose: Nose normal. No congestion or rhinorrhea.      Mouth/Throat:      Mouth: Mucous membranes are moist.

## 2024-08-31 NOTE — ED NOTES
Notable for the following components:       Result Value    RBC 3.96 (*)     All other components within normal limits   BASIC METABOLIC PANEL W/ REFLEX TO MG FOR LOW K - Abnormal; Notable for the following components:    Glucose 110 (*)     BUN 40 (*)     Creatinine 2.4 (*)     Est, Glom Filt Rate 19 (*)     All other components within normal limits   BRAIN NATRIURETIC PEPTIDE - Abnormal; Notable for the following components:    Pro-BNP 16,374 (*)     All other components within normal limits   TROPONIN - Abnormal; Notable for the following components:    Troponin, High Sensitivity 82 (*)     All other components within normal limits   TROPONIN - Abnormal; Notable for the following components:    Troponin, High Sensitivity 68 (*)     All other components within normal limits   URINALYSIS WITH REFLEX TO CULTURE - Abnormal; Notable for the following components:    Blood, Urine SMALL (*)     Protein, UA 30 (*)     Leukocyte Esterase, Urine LARGE (*)     All other components within normal limits   MICROSCOPIC URINALYSIS - Abnormal; Notable for the following components:    WBC, UA  (*)     Bacteria, UA 3+ (*)     All other components within normal limits     Critical values: elevated BNP, elevated troponin, BUN, Cr and GFR low    Abnormal Assessment Findings: Pt has generalized weakness, and was unable to walk with walker when attempting to ambulate. Has some scabs on legs that look like they are healing well. Bilateral lower legs are wrapped for compression.      Background  History:   Past Medical History:   Diagnosis Date    CHF (congestive heart failure) ([facility])     Hypertension        Assessment    Vitals/MEWS:        Vitals:    08/30/24 1519 08/30/24 1902 08/30/24 1959 08/30/24 2032   BP: (!) 198/91 (!) 179/86 (!) 197/92 (!) 184/73   Pulse:       Resp:    18   Temp:       SpO2: 98% 97% 95% 98%   Weight:         FiO2 (%):   O2 Flow Rate:    room air  Cardiac Rhythm:  NSR  Pain Assessment:  [x] Verbal [] Mirza

## 2024-09-01 PROBLEM — I50.33 ACUTE ON CHRONIC DIASTOLIC HEART FAILURE (HCC): Status: ACTIVE | Noted: 2017-12-21

## 2024-09-01 PROBLEM — R29.898 LEG WEAKNESS, BILATERAL: Status: ACTIVE | Noted: 2024-09-01

## 2024-09-01 LAB
ALBUMIN SERPL-MCNC: 3.6 G/DL (ref 3.4–5)
ANION GAP SERPL CALCULATED.3IONS-SCNC: 12 MMOL/L (ref 3–16)
BACTERIA UR CULT: NORMAL
BASOPHILS # BLD: 0.1 K/UL (ref 0–0.2)
BASOPHILS NFR BLD: 0.5 %
BUN SERPL-MCNC: 42 MG/DL (ref 7–20)
CALCIUM SERPL-MCNC: 9.2 MG/DL (ref 8.3–10.6)
CHLORIDE SERPL-SCNC: 100 MMOL/L (ref 99–110)
CO2 SERPL-SCNC: 25 MMOL/L (ref 21–32)
CREAT SERPL-MCNC: 2.6 MG/DL (ref 0.6–1.2)
DEPRECATED RDW RBC AUTO: 13.4 % (ref 12.4–15.4)
ECHO AO ASC DIAM: 3.2 CM
ECHO AO ASCENDING AORTA INDEX: 1.73 CM/M2
ECHO AO ROOT DIAM: 2.6 CM
ECHO AO ROOT INDEX: 1.41 CM/M2
ECHO AV MEAN GRADIENT: 11 MMHG
ECHO AV MEAN VELOCITY: 1.5 M/S
ECHO AV PEAK GRADIENT: 19 MMHG
ECHO AV PEAK VELOCITY: 2.2 M/S
ECHO AV VELOCITY RATIO: 0.55
ECHO AV VTI: 51.1 CM
ECHO BSA: 1.89 M2
ECHO LA AREA 2C: 26.7 CM2
ECHO LA AREA 4C: 22.9 CM2
ECHO LA MAJOR AXIS: 5.8 CM
ECHO LA MINOR AXIS: 6.4 CM
ECHO LA VOL BP: 80 ML (ref 22–52)
ECHO LA VOL MOD A2C: 88 ML (ref 22–52)
ECHO LA VOL MOD A4C: 66 ML (ref 22–52)
ECHO LA VOL/BSA BIPLANE: 43 ML/M2 (ref 16–34)
ECHO LA VOLUME INDEX MOD A2C: 48 ML/M2 (ref 16–34)
ECHO LA VOLUME INDEX MOD A4C: 36 ML/M2 (ref 16–34)
ECHO LV E' LATERAL VELOCITY: 7 CM/S
ECHO LV E' SEPTAL VELOCITY: 4 CM/S
ECHO LV EDV A2C: 82 ML
ECHO LV EDV A4C: 100 ML
ECHO LV EDV INDEX A4C: 54 ML/M2
ECHO LV EDV NDEX A2C: 44 ML/M2
ECHO LV EJECTION FRACTION A2C: 41 %
ECHO LV EJECTION FRACTION A4C: 56 %
ECHO LV EJECTION FRACTION BIPLANE: 50 % (ref 55–100)
ECHO LV ESV A2C: 49 ML
ECHO LV ESV A4C: 45 ML
ECHO LV ESV INDEX A2C: 26 ML/M2
ECHO LV ESV INDEX A4C: 24 ML/M2
ECHO LV FRACTIONAL SHORTENING: 28 % (ref 28–44)
ECHO LV INTERNAL DIMENSION DIASTOLE INDEX: 2.32 CM/M2
ECHO LV INTERNAL DIMENSION DIASTOLIC: 4.3 CM (ref 3.9–5.3)
ECHO LV INTERNAL DIMENSION SYSTOLIC INDEX: 1.68 CM/M2
ECHO LV INTERNAL DIMENSION SYSTOLIC: 3.1 CM
ECHO LV IVSD: 1 CM (ref 0.6–0.9)
ECHO LV MASS 2D: 152.6 G (ref 67–162)
ECHO LV MASS INDEX 2D: 82.5 G/M2 (ref 43–95)
ECHO LV POSTERIOR WALL DIASTOLIC: 1.1 CM (ref 0.6–0.9)
ECHO LV RELATIVE WALL THICKNESS RATIO: 0.51
ECHO LVOT AREA: 3.1 CM2
ECHO LVOT AV VTI INDEX: 0.52
ECHO LVOT DIAM: 2 CM
ECHO LVOT MEAN GRADIENT: 3 MMHG
ECHO LVOT PEAK GRADIENT: 5 MMHG
ECHO LVOT PEAK VELOCITY: 1.2 M/S
ECHO LVOT STROKE VOLUME INDEX: 45.3 ML/M2
ECHO LVOT SV: 83.8 ML
ECHO LVOT VTI: 26.7 CM
ECHO MV A VELOCITY: 1.44 M/S
ECHO MV AREA VTI: 2 CM2
ECHO MV E DECELERATION TIME (DT): 224 MS
ECHO MV E VELOCITY: 1.06 M/S
ECHO MV E/A RATIO: 0.74
ECHO MV E/E' LATERAL: 15.14
ECHO MV E/E' RATIO (AVERAGED): 20.82
ECHO MV E/E' SEPTAL: 26.5
ECHO MV LVOT VTI INDEX: 1.6
ECHO MV MAX VELOCITY: 1.6 M/S
ECHO MV MEAN GRADIENT: 4 MMHG
ECHO MV MEAN VELOCITY: 0.9 M/S
ECHO MV PEAK GRADIENT: 10 MMHG
ECHO MV VTI: 42.6 CM
ECHO PV ACCELERATION TIME (AT): 107 MS
ECHO PV MAX VELOCITY: 1 M/S
ECHO PV PEAK GRADIENT: 4 MMHG
ECHO RA AREA 4C: 10.4 CM2
ECHO RA END SYSTOLIC VOLUME APICAL 4 CHAMBER INDEX BSA: 11 ML/M2
ECHO RA VOLUME: 21 ML
ECHO RV FREE WALL PEAK S': 7 CM/S
ECHO RV TAPSE: 1.5 CM (ref 1.7–?)
ECHO TV REGURGITANT MAX VELOCITY: 2.54 M/S
ECHO TV REGURGITANT PEAK GRADIENT: 26 MMHG
EOSINOPHIL # BLD: 0.1 K/UL (ref 0–0.6)
EOSINOPHIL NFR BLD: 1 %
GFR SERPLBLD CREATININE-BSD FMLA CKD-EPI: 17 ML/MIN/{1.73_M2}
GLUCOSE SERPL-MCNC: 116 MG/DL (ref 70–99)
HCT VFR BLD AUTO: 38.5 % (ref 36–48)
HGB BLD-MCNC: 12.7 G/DL (ref 12–16)
LYMPHOCYTES # BLD: 1.9 K/UL (ref 1–5.1)
LYMPHOCYTES NFR BLD: 17.7 %
MAGNESIUM SERPL-MCNC: 2.1 MG/DL (ref 1.8–2.4)
MCH RBC QN AUTO: 31.5 PG (ref 26–34)
MCHC RBC AUTO-ENTMCNC: 33 G/DL (ref 31–36)
MCV RBC AUTO: 95.6 FL (ref 80–100)
MONOCYTES # BLD: 0.9 K/UL (ref 0–1.3)
MONOCYTES NFR BLD: 8.1 %
NEUTROPHILS # BLD: 7.6 K/UL (ref 1.7–7.7)
NEUTROPHILS NFR BLD: 72.7 %
PHOSPHATE SERPL-MCNC: 3.4 MG/DL (ref 2.5–4.9)
PLATELET # BLD AUTO: 197 K/UL (ref 135–450)
PMV BLD AUTO: 9.6 FL (ref 5–10.5)
POTASSIUM SERPL-SCNC: 4 MMOL/L (ref 3.5–5.1)
RBC # BLD AUTO: 4.03 M/UL (ref 4–5.2)
SODIUM SERPL-SCNC: 137 MMOL/L (ref 136–145)
WBC # BLD AUTO: 10.5 K/UL (ref 4–11)

## 2024-09-01 PROCEDURE — 85025 COMPLETE CBC W/AUTO DIFF WBC: CPT

## 2024-09-01 PROCEDURE — 2580000003 HC RX 258

## 2024-09-01 PROCEDURE — 99233 SBSQ HOSP IP/OBS HIGH 50: CPT | Performed by: INTERNAL MEDICINE

## 2024-09-01 PROCEDURE — 6370000000 HC RX 637 (ALT 250 FOR IP): Performed by: INTERNAL MEDICINE

## 2024-09-01 PROCEDURE — 6370000000 HC RX 637 (ALT 250 FOR IP)

## 2024-09-01 PROCEDURE — 80069 RENAL FUNCTION PANEL: CPT

## 2024-09-01 PROCEDURE — 93306 TTE W/DOPPLER COMPLETE: CPT | Performed by: INTERNAL MEDICINE

## 2024-09-01 PROCEDURE — 6360000002 HC RX W HCPCS

## 2024-09-01 PROCEDURE — 36415 COLL VENOUS BLD VENIPUNCTURE: CPT

## 2024-09-01 PROCEDURE — 83735 ASSAY OF MAGNESIUM: CPT

## 2024-09-01 PROCEDURE — 1200000000 HC SEMI PRIVATE

## 2024-09-01 RX ORDER — ACETAMINOPHEN 500 MG
1000 TABLET ORAL EVERY 8 HOURS PRN
Status: DISCONTINUED | OUTPATIENT
Start: 2024-09-01 | End: 2024-09-05 | Stop reason: HOSPADM

## 2024-09-01 RX ORDER — HYDROMORPHONE HYDROCHLORIDE 1 MG/ML
0.5 INJECTION, SOLUTION INTRAMUSCULAR; INTRAVENOUS; SUBCUTANEOUS ONCE
Status: DISCONTINUED | OUTPATIENT
Start: 2024-09-01 | End: 2024-09-01

## 2024-09-01 RX ORDER — BISACODYL 10 MG
10 SUPPOSITORY, RECTAL RECTAL ONCE
Status: COMPLETED | OUTPATIENT
Start: 2024-09-01 | End: 2024-09-01

## 2024-09-01 RX ORDER — HYDRALAZINE HYDROCHLORIDE 25 MG/1
37.5 TABLET, FILM COATED ORAL EVERY 8 HOURS SCHEDULED
Status: DISCONTINUED | OUTPATIENT
Start: 2024-09-02 | End: 2024-09-02

## 2024-09-01 RX ORDER — ACETAMINOPHEN 650 MG/1
650 SUPPOSITORY RECTAL EVERY 6 HOURS PRN
Status: DISCONTINUED | OUTPATIENT
Start: 2024-09-01 | End: 2024-09-05 | Stop reason: HOSPADM

## 2024-09-01 RX ORDER — LABETALOL HYDROCHLORIDE 5 MG/ML
10 INJECTION, SOLUTION INTRAVENOUS ONCE
Status: DISCONTINUED | OUTPATIENT
Start: 2024-09-01 | End: 2024-09-01

## 2024-09-01 RX ORDER — CHLORDIAZEPOXIDE HYDROCHLORIDE AND CLIDINIUM BROMIDE 5; 2.5 MG/1; MG/1
1 CAPSULE ORAL 3 TIMES DAILY PRN
Status: DISCONTINUED | OUTPATIENT
Start: 2024-09-01 | End: 2024-09-05 | Stop reason: HOSPADM

## 2024-09-01 RX ORDER — FUROSEMIDE 20 MG
20 TABLET ORAL DAILY
Status: DISCONTINUED | OUTPATIENT
Start: 2024-09-01 | End: 2024-09-02

## 2024-09-01 RX ORDER — LABETALOL HYDROCHLORIDE 5 MG/ML
10 INJECTION, SOLUTION INTRAVENOUS ONCE
Status: COMPLETED | OUTPATIENT
Start: 2024-09-01 | End: 2024-09-01

## 2024-09-01 RX ORDER — CARVEDILOL 6.25 MG/1
6.25 TABLET ORAL 2 TIMES DAILY WITH MEALS
Status: DISCONTINUED | OUTPATIENT
Start: 2024-09-01 | End: 2024-09-05 | Stop reason: HOSPADM

## 2024-09-01 RX ADMIN — ACETAMINOPHEN 650 MG: 325 TABLET ORAL at 09:52

## 2024-09-01 RX ADMIN — ACETAMINOPHEN 1000 MG: 325 TABLET ORAL at 23:34

## 2024-09-01 RX ADMIN — LABETALOL HYDROCHLORIDE 10 MG: 5 INJECTION, SOLUTION INTRAVENOUS at 01:28

## 2024-09-01 RX ADMIN — ISOSORBIDE MONONITRATE 30 MG: 30 TABLET, EXTENDED RELEASE ORAL at 09:44

## 2024-09-01 RX ADMIN — TRAMADOL HYDROCHLORIDE 50 MG: 50 TABLET ORAL at 09:49

## 2024-09-01 RX ADMIN — CARVEDILOL 6.25 MG: 6.25 TABLET, FILM COATED ORAL at 09:44

## 2024-09-01 RX ADMIN — HYDRALAZINE HYDROCHLORIDE 25 MG: 25 TABLET ORAL at 22:00

## 2024-09-01 RX ADMIN — APIXABAN 2.5 MG: 2.5 TABLET, FILM COATED ORAL at 09:44

## 2024-09-01 RX ADMIN — CHLORDIAZEPOXIDE HYDROCHLORIDE AND CLIDINIUM BROMIDE 1 CAPSULE: 5; 2.5 CAPSULE ORAL at 04:37

## 2024-09-01 RX ADMIN — ALUMINUM HYDROXIDE, MAGNESIUM HYDROXIDE, AND SIMETHICONE: 200; 200; 20 SUSPENSION ORAL at 04:37

## 2024-09-01 RX ADMIN — WATER 1000 MG: 1 INJECTION INTRAMUSCULAR; INTRAVENOUS; SUBCUTANEOUS at 12:43

## 2024-09-01 RX ADMIN — CHLORDIAZEPOXIDE HYDROCHLORIDE AND CLIDINIUM BROMIDE 1 CAPSULE: 5; 2.5 CAPSULE ORAL at 21:59

## 2024-09-01 RX ADMIN — HYDRALAZINE HYDROCHLORIDE 25 MG: 25 TABLET ORAL at 16:15

## 2024-09-01 RX ADMIN — HYDRALAZINE HYDROCHLORIDE 25 MG: 25 TABLET ORAL at 06:25

## 2024-09-01 RX ADMIN — CARVEDILOL 6.25 MG: 6.25 TABLET, FILM COATED ORAL at 18:19

## 2024-09-01 RX ADMIN — TRAMADOL HYDROCHLORIDE 50 MG: 50 TABLET ORAL at 22:00

## 2024-09-01 RX ADMIN — BISACODYL 10 MG: 10 SUPPOSITORY RECTAL at 01:28

## 2024-09-01 RX ADMIN — FUROSEMIDE 20 MG: 20 TABLET ORAL at 09:44

## 2024-09-01 RX ADMIN — APIXABAN 2.5 MG: 2.5 TABLET, FILM COATED ORAL at 21:59

## 2024-09-01 RX ADMIN — SODIUM CHLORIDE, PRESERVATIVE FREE 10 ML: 5 INJECTION INTRAVENOUS at 09:44

## 2024-09-01 RX ADMIN — ACETAMINOPHEN 1000 MG: 325 TABLET ORAL at 16:15

## 2024-09-01 RX ADMIN — SODIUM CHLORIDE, PRESERVATIVE FREE 10 ML: 5 INJECTION INTRAVENOUS at 22:02

## 2024-09-01 ASSESSMENT — PAIN DESCRIPTION - LOCATION
LOCATION: LEG

## 2024-09-01 ASSESSMENT — PAIN DESCRIPTION - DESCRIPTORS
DESCRIPTORS: THROBBING
DESCRIPTORS: THROBBING

## 2024-09-01 ASSESSMENT — PAIN SCALES - GENERAL
PAINLEVEL_OUTOF10: 7
PAINLEVEL_OUTOF10: 3
PAINLEVEL_OUTOF10: 7
PAINLEVEL_OUTOF10: 3
PAINLEVEL_OUTOF10: 3

## 2024-09-01 ASSESSMENT — PAIN DESCRIPTION - ONSET
ONSET: GRADUAL
ONSET: GRADUAL

## 2024-09-01 ASSESSMENT — PAIN DESCRIPTION - PAIN TYPE
TYPE: CHRONIC PAIN
TYPE: CHRONIC PAIN

## 2024-09-01 ASSESSMENT — PAIN DESCRIPTION - ORIENTATION
ORIENTATION: RIGHT;LEFT

## 2024-09-01 ASSESSMENT — PAIN - FUNCTIONAL ASSESSMENT
PAIN_FUNCTIONAL_ASSESSMENT: ACTIVITIES ARE NOT PREVENTED
PAIN_FUNCTIONAL_ASSESSMENT: ACTIVITIES ARE NOT PREVENTED

## 2024-09-01 ASSESSMENT — PAIN DESCRIPTION - FREQUENCY
FREQUENCY: INTERMITTENT
FREQUENCY: INTERMITTENT

## 2024-09-01 NOTE — DISCHARGE INSTRUCTIONS
Extra Heart Failure Education/ Tools/ Resources:     https://Skoovy.com/publication/?[email or web address]   --- this is American Heart Association interactive Healthier Living with Heart Failure guidebook.  Please click hyperlink or copy / paste link into search bar. The QR Code is also available below. Use your mouse to scroll through the pages.  Lots of information about weight monitoring, diet tips, activity, meds, etc    Heart Failure Tools and Resources QR Code is below. It includes multiple resources to include symptom tracker, med tracker, further HF info, and access to a HF Support Network online Community    HF [place] Mariah  -- this is a free smart phone mariah available for iPhone and Android download.  Use your phone to track sodium / fluid intake, zone tool symptom tracking, weights, medications, etc. Click on this hyperlink  HF [place] Mariah   for QR code for easy download or the link is also found in the below HF Tools and Resources.      DASH (Dietary Approach to Stop Hypertension) diet --  https://www.nhlbi.nih.gov/education/dash-eating-plan -- this diet is a flexible eating plan that promotes heart healthy eating style.  Click on hyperlink or copy / paste link into search bar.  Lots of low sodium recipes and tips.    https://www.[employer] App/recipes  -- more free recipes

## 2024-09-02 PROBLEM — I35.0 NONRHEUMATIC AORTIC VALVE STENOSIS: Status: ACTIVE | Noted: 2024-09-02

## 2024-09-02 LAB
ALBUMIN SERPL-MCNC: 3.4 G/DL (ref 3.4–5)
ANION GAP SERPL CALCULATED.3IONS-SCNC: 11 MMOL/L (ref 3–16)
BASOPHILS # BLD: 0.1 K/UL (ref 0–0.2)
BASOPHILS NFR BLD: 0.7 %
BUN SERPL-MCNC: 45 MG/DL (ref 7–20)
CALCIUM SERPL-MCNC: 9.1 MG/DL (ref 8.3–10.6)
CHLORIDE SERPL-SCNC: 100 MMOL/L (ref 99–110)
CO2 SERPL-SCNC: 25 MMOL/L (ref 21–32)
CREAT SERPL-MCNC: 2.8 MG/DL (ref 0.6–1.2)
DEPRECATED RDW RBC AUTO: 13.3 % (ref 12.4–15.4)
EOSINOPHIL # BLD: 0.4 K/UL (ref 0–0.6)
EOSINOPHIL NFR BLD: 5 %
GFR SERPLBLD CREATININE-BSD FMLA CKD-EPI: 16 ML/MIN/{1.73_M2}
GLUCOSE SERPL-MCNC: 98 MG/DL (ref 70–99)
HCT VFR BLD AUTO: 38.4 % (ref 36–48)
HGB BLD-MCNC: 12.6 G/DL (ref 12–16)
LYMPHOCYTES # BLD: 2.6 K/UL (ref 1–5.1)
LYMPHOCYTES NFR BLD: 31.1 %
MAGNESIUM SERPL-MCNC: 2.2 MG/DL (ref 1.8–2.4)
MCH RBC QN AUTO: 31.3 PG (ref 26–34)
MCHC RBC AUTO-ENTMCNC: 32.9 G/DL (ref 31–36)
MCV RBC AUTO: 95.2 FL (ref 80–100)
MONOCYTES # BLD: 0.8 K/UL (ref 0–1.3)
MONOCYTES NFR BLD: 10 %
NEUTROPHILS # BLD: 4.5 K/UL (ref 1.7–7.7)
NEUTROPHILS NFR BLD: 53.2 %
PHOSPHATE SERPL-MCNC: 3.3 MG/DL (ref 2.5–4.9)
PLATELET # BLD AUTO: 186 K/UL (ref 135–450)
PMV BLD AUTO: 10 FL (ref 5–10.5)
POTASSIUM SERPL-SCNC: 4.1 MMOL/L (ref 3.5–5.1)
RBC # BLD AUTO: 4.03 M/UL (ref 4–5.2)
SODIUM SERPL-SCNC: 136 MMOL/L (ref 136–145)
WBC # BLD AUTO: 8.5 K/UL (ref 4–11)

## 2024-09-02 PROCEDURE — 1200000000 HC SEMI PRIVATE

## 2024-09-02 PROCEDURE — 6370000000 HC RX 637 (ALT 250 FOR IP)

## 2024-09-02 PROCEDURE — 2580000003 HC RX 258

## 2024-09-02 PROCEDURE — 99233 SBSQ HOSP IP/OBS HIGH 50: CPT | Performed by: INTERNAL MEDICINE

## 2024-09-02 PROCEDURE — 36415 COLL VENOUS BLD VENIPUNCTURE: CPT

## 2024-09-02 PROCEDURE — 6370000000 HC RX 637 (ALT 250 FOR IP): Performed by: INTERNAL MEDICINE

## 2024-09-02 PROCEDURE — 6360000002 HC RX W HCPCS

## 2024-09-02 PROCEDURE — 83735 ASSAY OF MAGNESIUM: CPT

## 2024-09-02 PROCEDURE — 85025 COMPLETE CBC W/AUTO DIFF WBC: CPT

## 2024-09-02 PROCEDURE — 97530 THERAPEUTIC ACTIVITIES: CPT

## 2024-09-02 PROCEDURE — 97162 PT EVAL MOD COMPLEX 30 MIN: CPT

## 2024-09-02 PROCEDURE — 80069 RENAL FUNCTION PANEL: CPT

## 2024-09-02 RX ORDER — HYDRALAZINE HYDROCHLORIDE 25 MG/1
25 TABLET, FILM COATED ORAL EVERY 8 HOURS SCHEDULED
Status: DISCONTINUED | OUTPATIENT
Start: 2024-09-02 | End: 2024-09-05 | Stop reason: HOSPADM

## 2024-09-02 RX ORDER — ACETAMINOPHEN 500 MG
1000 TABLET ORAL EVERY 12 HOURS
Status: DISCONTINUED | OUTPATIENT
Start: 2024-09-02 | End: 2024-09-05 | Stop reason: HOSPADM

## 2024-09-02 RX ADMIN — CARVEDILOL 6.25 MG: 6.25 TABLET, FILM COATED ORAL at 16:55

## 2024-09-02 RX ADMIN — APIXABAN 2.5 MG: 2.5 TABLET, FILM COATED ORAL at 21:43

## 2024-09-02 RX ADMIN — HYDRALAZINE HYDROCHLORIDE 25 MG: 25 TABLET ORAL at 13:30

## 2024-09-02 RX ADMIN — CHLORDIAZEPOXIDE HYDROCHLORIDE AND CLIDINIUM BROMIDE 1 CAPSULE: 5; 2.5 CAPSULE ORAL at 05:07

## 2024-09-02 RX ADMIN — WATER 1000 MG: 1 INJECTION INTRAMUSCULAR; INTRAVENOUS; SUBCUTANEOUS at 12:36

## 2024-09-02 RX ADMIN — CARVEDILOL 6.25 MG: 6.25 TABLET, FILM COATED ORAL at 09:25

## 2024-09-02 RX ADMIN — HYDRALAZINE HYDROCHLORIDE 25 MG: 25 TABLET ORAL at 21:43

## 2024-09-02 RX ADMIN — SODIUM CHLORIDE, PRESERVATIVE FREE 10 ML: 5 INJECTION INTRAVENOUS at 21:42

## 2024-09-02 RX ADMIN — ACETAMINOPHEN 1000 MG: 500 TABLET ORAL at 16:54

## 2024-09-02 RX ADMIN — TRAMADOL HYDROCHLORIDE 50 MG: 50 TABLET ORAL at 09:25

## 2024-09-02 RX ADMIN — SODIUM CHLORIDE, PRESERVATIVE FREE 10 ML: 5 INJECTION INTRAVENOUS at 09:27

## 2024-09-02 RX ADMIN — ISOSORBIDE MONONITRATE 30 MG: 30 TABLET, EXTENDED RELEASE ORAL at 09:25

## 2024-09-02 RX ADMIN — CHLORDIAZEPOXIDE HYDROCHLORIDE AND CLIDINIUM BROMIDE 1 CAPSULE: 5; 2.5 CAPSULE ORAL at 22:21

## 2024-09-02 RX ADMIN — FUROSEMIDE 20 MG: 20 TABLET ORAL at 09:25

## 2024-09-02 RX ADMIN — CHLORDIAZEPOXIDE HYDROCHLORIDE AND CLIDINIUM BROMIDE 1 CAPSULE: 5; 2.5 CAPSULE ORAL at 13:26

## 2024-09-02 RX ADMIN — TRAMADOL HYDROCHLORIDE 50 MG: 50 TABLET ORAL at 21:56

## 2024-09-02 RX ADMIN — ACETAMINOPHEN 1000 MG: 325 TABLET ORAL at 21:56

## 2024-09-02 RX ADMIN — HYDRALAZINE HYDROCHLORIDE 37.5 MG: 25 TABLET ORAL at 05:06

## 2024-09-02 RX ADMIN — APIXABAN 2.5 MG: 2.5 TABLET, FILM COATED ORAL at 09:25

## 2024-09-02 ASSESSMENT — PAIN DESCRIPTION - LOCATION
LOCATION: LEG
LOCATION: GENERALIZED
LOCATION: LEG

## 2024-09-02 ASSESSMENT — PAIN SCALES - GENERAL
PAINLEVEL_OUTOF10: 4
PAINLEVEL_OUTOF10: 8
PAINLEVEL_OUTOF10: 0
PAINLEVEL_OUTOF10: 8
PAINLEVEL_OUTOF10: 7
PAINLEVEL_OUTOF10: 8
PAINLEVEL_OUTOF10: 4
PAINLEVEL_OUTOF10: 10
PAINLEVEL_OUTOF10: 10
PAINLEVEL_OUTOF10: 8

## 2024-09-02 ASSESSMENT — PAIN - FUNCTIONAL ASSESSMENT
PAIN_FUNCTIONAL_ASSESSMENT: PREVENTS OR INTERFERES SOME ACTIVE ACTIVITIES AND ADLS
PAIN_FUNCTIONAL_ASSESSMENT: PREVENTS OR INTERFERES SOME ACTIVE ACTIVITIES AND ADLS
PAIN_FUNCTIONAL_ASSESSMENT: PREVENTS OR INTERFERES WITH MANY ACTIVE NOT PASSIVE ACTIVITIES
PAIN_FUNCTIONAL_ASSESSMENT: PREVENTS OR INTERFERES SOME ACTIVE ACTIVITIES AND ADLS
PAIN_FUNCTIONAL_ASSESSMENT: ACTIVITIES ARE NOT PREVENTED

## 2024-09-02 ASSESSMENT — PAIN DESCRIPTION - ORIENTATION
ORIENTATION: RIGHT;LEFT

## 2024-09-02 ASSESSMENT — PAIN DESCRIPTION - ONSET
ONSET: ON-GOING
ONSET: ON-GOING
ONSET: GRADUAL

## 2024-09-02 ASSESSMENT — PAIN DESCRIPTION - DESCRIPTORS
DESCRIPTORS: ACHING
DESCRIPTORS: THROBBING
DESCRIPTORS: ACHING
DESCRIPTORS: DISCOMFORT

## 2024-09-02 ASSESSMENT — PAIN DESCRIPTION - PAIN TYPE
TYPE: CHRONIC PAIN

## 2024-09-02 ASSESSMENT — ENCOUNTER SYMPTOMS
SHORTNESS OF BREATH: 0
CHEST TIGHTNESS: 0
CHOKING: 0

## 2024-09-02 ASSESSMENT — PAIN DESCRIPTION - FREQUENCY
FREQUENCY: CONTINUOUS
FREQUENCY: CONTINUOUS
FREQUENCY: INTERMITTENT

## 2024-09-03 ENCOUNTER — APPOINTMENT (OUTPATIENT)
Dept: CT IMAGING | Age: 84
DRG: 291 | End: 2024-09-03
Payer: MEDICARE

## 2024-09-03 ENCOUNTER — APPOINTMENT (OUTPATIENT)
Age: 84
DRG: 291 | End: 2024-09-03
Attending: INTERNAL MEDICINE
Payer: MEDICARE

## 2024-09-03 ENCOUNTER — APPOINTMENT (OUTPATIENT)
Dept: VASCULAR LAB | Age: 84
DRG: 291 | End: 2024-09-03
Payer: MEDICARE

## 2024-09-03 LAB
ALBUMIN SERPL-MCNC: 3.6 G/DL (ref 3.4–5)
ANION GAP SERPL CALCULATED.3IONS-SCNC: 14 MMOL/L (ref 3–16)
BASOPHILS # BLD: 0.1 K/UL (ref 0–0.2)
BASOPHILS NFR BLD: 0.8 %
BUN SERPL-MCNC: 49 MG/DL (ref 7–20)
CALCIUM SERPL-MCNC: 9.3 MG/DL (ref 8.3–10.6)
CHLORIDE SERPL-SCNC: 97 MMOL/L (ref 99–110)
CO2 SERPL-SCNC: 23 MMOL/L (ref 21–32)
CREAT SERPL-MCNC: 2.5 MG/DL (ref 0.6–1.2)
DEPRECATED RDW RBC AUTO: 13.3 % (ref 12.4–15.4)
ECHO BSA: 1.89 M2
EOSINOPHIL # BLD: 0.5 K/UL (ref 0–0.6)
EOSINOPHIL NFR BLD: 6 %
GFR SERPLBLD CREATININE-BSD FMLA CKD-EPI: 18 ML/MIN/{1.73_M2}
GLUCOSE SERPL-MCNC: 98 MG/DL (ref 70–99)
HCT VFR BLD AUTO: 37.2 % (ref 36–48)
HGB BLD-MCNC: 12.4 G/DL (ref 12–16)
LYMPHOCYTES # BLD: 1.9 K/UL (ref 1–5.1)
LYMPHOCYTES NFR BLD: 22.4 %
MAGNESIUM SERPL-MCNC: 2.2 MG/DL (ref 1.8–2.4)
MCH RBC QN AUTO: 31.7 PG (ref 26–34)
MCHC RBC AUTO-ENTMCNC: 33.4 G/DL (ref 31–36)
MCV RBC AUTO: 94.8 FL (ref 80–100)
MONOCYTES # BLD: 0.9 K/UL (ref 0–1.3)
MONOCYTES NFR BLD: 10.4 %
NEUTROPHILS # BLD: 5.2 K/UL (ref 1.7–7.7)
NEUTROPHILS NFR BLD: 60.4 %
PHOSPHATE SERPL-MCNC: 3.6 MG/DL (ref 2.5–4.9)
PLATELET # BLD AUTO: 187 K/UL (ref 135–450)
PMV BLD AUTO: 10.3 FL (ref 5–10.5)
POTASSIUM SERPL-SCNC: 3.8 MMOL/L (ref 3.5–5.1)
RBC # BLD AUTO: 3.93 M/UL (ref 4–5.2)
SODIUM SERPL-SCNC: 134 MMOL/L (ref 136–145)
WBC # BLD AUTO: 8.5 K/UL (ref 4–11)

## 2024-09-03 PROCEDURE — 6360000002 HC RX W HCPCS

## 2024-09-03 PROCEDURE — 99233 SBSQ HOSP IP/OBS HIGH 50: CPT | Performed by: INTERNAL MEDICINE

## 2024-09-03 PROCEDURE — 85025 COMPLETE CBC W/AUTO DIFF WBC: CPT

## 2024-09-03 PROCEDURE — 83735 ASSAY OF MAGNESIUM: CPT

## 2024-09-03 PROCEDURE — 6370000000 HC RX 637 (ALT 250 FOR IP)

## 2024-09-03 PROCEDURE — 93970 EXTREMITY STUDY: CPT

## 2024-09-03 PROCEDURE — 2580000003 HC RX 258

## 2024-09-03 PROCEDURE — 93970 EXTREMITY STUDY: CPT | Performed by: SURGERY

## 2024-09-03 PROCEDURE — 6370000000 HC RX 637 (ALT 250 FOR IP): Performed by: INTERNAL MEDICINE

## 2024-09-03 PROCEDURE — 1200000000 HC SEMI PRIVATE

## 2024-09-03 PROCEDURE — 80069 RENAL FUNCTION PANEL: CPT

## 2024-09-03 PROCEDURE — 70450 CT HEAD/BRAIN W/O DYE: CPT

## 2024-09-03 PROCEDURE — 36415 COLL VENOUS BLD VENIPUNCTURE: CPT

## 2024-09-03 RX ORDER — TORSEMIDE 20 MG/1
20 TABLET ORAL DAILY
Status: DISCONTINUED | OUTPATIENT
Start: 2024-09-03 | End: 2024-09-05 | Stop reason: HOSPADM

## 2024-09-03 RX ORDER — CINACALCET 30 MG/1
30 TABLET, FILM COATED ORAL
Status: DISCONTINUED | OUTPATIENT
Start: 2024-09-03 | End: 2024-09-05 | Stop reason: HOSPADM

## 2024-09-03 RX ADMIN — CINACALCET 30 MG: 30 TABLET, FILM COATED ORAL at 10:40

## 2024-09-03 RX ADMIN — ACETAMINOPHEN 1000 MG: 500 TABLET ORAL at 07:04

## 2024-09-03 RX ADMIN — APIXABAN 2.5 MG: 2.5 TABLET, FILM COATED ORAL at 09:43

## 2024-09-03 RX ADMIN — ACETAMINOPHEN 1000 MG: 325 TABLET ORAL at 22:11

## 2024-09-03 RX ADMIN — TRAMADOL HYDROCHLORIDE 50 MG: 50 TABLET ORAL at 22:11

## 2024-09-03 RX ADMIN — ACETAMINOPHEN 1000 MG: 500 TABLET ORAL at 17:50

## 2024-09-03 RX ADMIN — HYDRALAZINE HYDROCHLORIDE 25 MG: 25 TABLET ORAL at 07:04

## 2024-09-03 RX ADMIN — CARVEDILOL 6.25 MG: 6.25 TABLET, FILM COATED ORAL at 16:40

## 2024-09-03 RX ADMIN — CARVEDILOL 6.25 MG: 6.25 TABLET, FILM COATED ORAL at 09:43

## 2024-09-03 RX ADMIN — SODIUM CHLORIDE, PRESERVATIVE FREE 10 ML: 5 INJECTION INTRAVENOUS at 09:45

## 2024-09-03 RX ADMIN — APIXABAN 2.5 MG: 2.5 TABLET, FILM COATED ORAL at 22:09

## 2024-09-03 RX ADMIN — WATER 1000 MG: 1 INJECTION INTRAMUSCULAR; INTRAVENOUS; SUBCUTANEOUS at 13:03

## 2024-09-03 RX ADMIN — CHLORDIAZEPOXIDE HYDROCHLORIDE AND CLIDINIUM BROMIDE 1 CAPSULE: 5; 2.5 CAPSULE ORAL at 10:40

## 2024-09-03 RX ADMIN — HYDRALAZINE HYDROCHLORIDE 25 MG: 25 TABLET ORAL at 16:40

## 2024-09-03 RX ADMIN — TORSEMIDE 20 MG: 20 TABLET ORAL at 10:40

## 2024-09-03 RX ADMIN — ISOSORBIDE MONONITRATE 30 MG: 30 TABLET, EXTENDED RELEASE ORAL at 09:43

## 2024-09-03 RX ADMIN — TRAMADOL HYDROCHLORIDE 50 MG: 50 TABLET ORAL at 09:50

## 2024-09-03 ASSESSMENT — PAIN DESCRIPTION - ORIENTATION
ORIENTATION: RIGHT;LEFT

## 2024-09-03 ASSESSMENT — PAIN - FUNCTIONAL ASSESSMENT
PAIN_FUNCTIONAL_ASSESSMENT: PREVENTS OR INTERFERES SOME ACTIVE ACTIVITIES AND ADLS
PAIN_FUNCTIONAL_ASSESSMENT: PREVENTS OR INTERFERES WITH MANY ACTIVE NOT PASSIVE ACTIVITIES
PAIN_FUNCTIONAL_ASSESSMENT: ACTIVITIES ARE NOT PREVENTED
PAIN_FUNCTIONAL_ASSESSMENT: PREVENTS OR INTERFERES WITH ALL ACTIVE AND SOME PASSIVE ACTIVITIES

## 2024-09-03 ASSESSMENT — PAIN DESCRIPTION - LOCATION
LOCATION: LEG

## 2024-09-03 ASSESSMENT — PAIN SCALES - GENERAL
PAINLEVEL_OUTOF10: 0
PAINLEVEL_OUTOF10: 8
PAINLEVEL_OUTOF10: 2
PAINLEVEL_OUTOF10: 7
PAINLEVEL_OUTOF10: 0
PAINLEVEL_OUTOF10: 0
PAINLEVEL_OUTOF10: 10
PAINLEVEL_OUTOF10: 0

## 2024-09-03 ASSESSMENT — PAIN DESCRIPTION - PAIN TYPE
TYPE: CHRONIC PAIN
TYPE: CHRONIC PAIN

## 2024-09-03 ASSESSMENT — ENCOUNTER SYMPTOMS
CHEST TIGHTNESS: 0
CHOKING: 0
SHORTNESS OF BREATH: 0

## 2024-09-03 ASSESSMENT — PAIN DESCRIPTION - FREQUENCY
FREQUENCY: CONTINUOUS
FREQUENCY: CONTINUOUS

## 2024-09-03 ASSESSMENT — PAIN DESCRIPTION - DESCRIPTORS
DESCRIPTORS: ACHING;GNAWING
DESCRIPTORS: ACHING
DESCRIPTORS: DISCOMFORT

## 2024-09-03 ASSESSMENT — PAIN DESCRIPTION - ONSET
ONSET: ON-GOING
ONSET: ON-GOING

## 2024-09-04 ENCOUNTER — APPOINTMENT (OUTPATIENT)
Dept: MRI IMAGING | Age: 84
DRG: 291 | End: 2024-09-04
Payer: MEDICARE

## 2024-09-04 ENCOUNTER — APPOINTMENT (OUTPATIENT)
Dept: CT IMAGING | Age: 84
DRG: 291 | End: 2024-09-04
Payer: MEDICARE

## 2024-09-04 LAB
ALBUMIN SERPL-MCNC: 3.5 G/DL (ref 3.4–5)
ANION GAP SERPL CALCULATED.3IONS-SCNC: 14 MMOL/L (ref 3–16)
BASOPHILS # BLD: 0.1 K/UL (ref 0–0.2)
BASOPHILS NFR BLD: 1 %
BUN SERPL-MCNC: 58 MG/DL (ref 7–20)
CALCIUM SERPL-MCNC: 9.1 MG/DL (ref 8.3–10.6)
CHLORIDE SERPL-SCNC: 97 MMOL/L (ref 99–110)
CO2 SERPL-SCNC: 25 MMOL/L (ref 21–32)
CREAT SERPL-MCNC: 2.9 MG/DL (ref 0.6–1.2)
DEPRECATED RDW RBC AUTO: 13.2 % (ref 12.4–15.4)
EOSINOPHIL # BLD: 0.3 K/UL (ref 0–0.6)
EOSINOPHIL NFR BLD: 4.3 %
GFR SERPLBLD CREATININE-BSD FMLA CKD-EPI: 15 ML/MIN/{1.73_M2}
GLUCOSE SERPL-MCNC: 88 MG/DL (ref 70–99)
HCT VFR BLD AUTO: 36.1 % (ref 36–48)
HGB BLD-MCNC: 12.1 G/DL (ref 12–16)
LYMPHOCYTES # BLD: 1.1 K/UL (ref 1–5.1)
LYMPHOCYTES NFR BLD: 15.8 %
MAGNESIUM SERPL-MCNC: 2.1 MG/DL (ref 1.8–2.4)
MCH RBC QN AUTO: 31.8 PG (ref 26–34)
MCHC RBC AUTO-ENTMCNC: 33.6 G/DL (ref 31–36)
MCV RBC AUTO: 94.6 FL (ref 80–100)
MONOCYTES # BLD: 0.7 K/UL (ref 0–1.3)
MONOCYTES NFR BLD: 10.3 %
NEUTROPHILS # BLD: 4.7 K/UL (ref 1.7–7.7)
NEUTROPHILS NFR BLD: 68.6 %
NT-PROBNP SERPL-MCNC: ABNORMAL PG/ML (ref 0–449)
PHOSPHATE SERPL-MCNC: 4.4 MG/DL (ref 2.5–4.9)
PLATELET # BLD AUTO: 210 K/UL (ref 135–450)
PMV BLD AUTO: 10 FL (ref 5–10.5)
POTASSIUM SERPL-SCNC: 4 MMOL/L (ref 3.5–5.1)
RBC # BLD AUTO: 3.81 M/UL (ref 4–5.2)
SODIUM SERPL-SCNC: 136 MMOL/L (ref 136–145)
WBC # BLD AUTO: 6.8 K/UL (ref 4–11)

## 2024-09-04 PROCEDURE — 99233 SBSQ HOSP IP/OBS HIGH 50: CPT | Performed by: INTERNAL MEDICINE

## 2024-09-04 PROCEDURE — 97530 THERAPEUTIC ACTIVITIES: CPT

## 2024-09-04 PROCEDURE — 6370000000 HC RX 637 (ALT 250 FOR IP)

## 2024-09-04 PROCEDURE — 1200000000 HC SEMI PRIVATE

## 2024-09-04 PROCEDURE — 92526 ORAL FUNCTION THERAPY: CPT

## 2024-09-04 PROCEDURE — 97112 NEUROMUSCULAR REEDUCATION: CPT

## 2024-09-04 PROCEDURE — 83880 ASSAY OF NATRIURETIC PEPTIDE: CPT

## 2024-09-04 PROCEDURE — 83735 ASSAY OF MAGNESIUM: CPT

## 2024-09-04 PROCEDURE — 85025 COMPLETE CBC W/AUTO DIFF WBC: CPT

## 2024-09-04 PROCEDURE — 6370000000 HC RX 637 (ALT 250 FOR IP): Performed by: INTERNAL MEDICINE

## 2024-09-04 PROCEDURE — 80069 RENAL FUNCTION PANEL: CPT

## 2024-09-04 PROCEDURE — 2580000003 HC RX 258

## 2024-09-04 PROCEDURE — 36415 COLL VENOUS BLD VENIPUNCTURE: CPT

## 2024-09-04 RX ADMIN — TRAMADOL HYDROCHLORIDE 50 MG: 50 TABLET ORAL at 21:28

## 2024-09-04 RX ADMIN — HYDRALAZINE HYDROCHLORIDE 25 MG: 25 TABLET ORAL at 21:28

## 2024-09-04 RX ADMIN — CARVEDILOL 6.25 MG: 6.25 TABLET, FILM COATED ORAL at 09:55

## 2024-09-04 RX ADMIN — CARVEDILOL 6.25 MG: 6.25 TABLET, FILM COATED ORAL at 18:20

## 2024-09-04 RX ADMIN — ACETAMINOPHEN 1000 MG: 500 TABLET ORAL at 06:50

## 2024-09-04 RX ADMIN — ONDANSETRON 4 MG: 4 TABLET, ORALLY DISINTEGRATING ORAL at 11:45

## 2024-09-04 RX ADMIN — ACETAMINOPHEN 1000 MG: 500 TABLET ORAL at 18:20

## 2024-09-04 RX ADMIN — ISOSORBIDE MONONITRATE 30 MG: 30 TABLET, EXTENDED RELEASE ORAL at 09:55

## 2024-09-04 RX ADMIN — CHLORDIAZEPOXIDE HYDROCHLORIDE AND CLIDINIUM BROMIDE 1 CAPSULE: 5; 2.5 CAPSULE ORAL at 09:54

## 2024-09-04 RX ADMIN — SODIUM CHLORIDE, PRESERVATIVE FREE 10 ML: 5 INJECTION INTRAVENOUS at 09:56

## 2024-09-04 RX ADMIN — HYDRALAZINE HYDROCHLORIDE 25 MG: 25 TABLET ORAL at 07:33

## 2024-09-04 RX ADMIN — TRAMADOL HYDROCHLORIDE 50 MG: 50 TABLET ORAL at 11:45

## 2024-09-04 RX ADMIN — APIXABAN 2.5 MG: 2.5 TABLET, FILM COATED ORAL at 09:55

## 2024-09-04 RX ADMIN — APIXABAN 2.5 MG: 2.5 TABLET, FILM COATED ORAL at 21:28

## 2024-09-04 ASSESSMENT — PAIN SCALES - GENERAL
PAINLEVEL_OUTOF10: 4
PAINLEVEL_OUTOF10: 0
PAINLEVEL_OUTOF10: 3
PAINLEVEL_OUTOF10: 0
PAINLEVEL_OUTOF10: 0
PAINLEVEL_OUTOF10: 7
PAINLEVEL_OUTOF10: 0
PAINLEVEL_OUTOF10: 9
PAINLEVEL_OUTOF10: 5
PAINLEVEL_OUTOF10: 9

## 2024-09-04 ASSESSMENT — ENCOUNTER SYMPTOMS
CHOKING: 0
SHORTNESS OF BREATH: 0
CHEST TIGHTNESS: 0

## 2024-09-04 ASSESSMENT — PAIN DESCRIPTION - LOCATION
LOCATION: BACK
LOCATION: BACK
LOCATION: GENERALIZED
LOCATION: BUTTOCKS;BACK

## 2024-09-04 ASSESSMENT — PAIN - FUNCTIONAL ASSESSMENT
PAIN_FUNCTIONAL_ASSESSMENT: PREVENTS OR INTERFERES SOME ACTIVE ACTIVITIES AND ADLS
PAIN_FUNCTIONAL_ASSESSMENT: ACTIVITIES ARE NOT PREVENTED
PAIN_FUNCTIONAL_ASSESSMENT: PREVENTS OR INTERFERES SOME ACTIVE ACTIVITIES AND ADLS
PAIN_FUNCTIONAL_ASSESSMENT: PREVENTS OR INTERFERES SOME ACTIVE ACTIVITIES AND ADLS

## 2024-09-04 ASSESSMENT — PAIN DESCRIPTION - DIRECTION
RADIATING_TOWARDS: NO
RADIATING_TOWARDS: NO

## 2024-09-04 ASSESSMENT — PAIN DESCRIPTION - ORIENTATION
ORIENTATION: MID

## 2024-09-04 ASSESSMENT — PAIN DESCRIPTION - ONSET
ONSET: ON-GOING

## 2024-09-04 ASSESSMENT — PAIN DESCRIPTION - FREQUENCY
FREQUENCY: INTERMITTENT
FREQUENCY: INTERMITTENT
FREQUENCY: CONTINUOUS

## 2024-09-04 ASSESSMENT — PAIN DESCRIPTION - DESCRIPTORS
DESCRIPTORS: ACHING
DESCRIPTORS: DISCOMFORT
DESCRIPTORS: ACHING
DESCRIPTORS: ACHING

## 2024-09-04 ASSESSMENT — PAIN DESCRIPTION - PAIN TYPE
TYPE: ACUTE PAIN;CHRONIC PAIN
TYPE: CHRONIC PAIN
TYPE: ACUTE PAIN;CHRONIC PAIN

## 2024-09-04 NOTE — CONSULTS
[facility]  Palliative Medicine Consultation Note      Date Of Admission:8/30/2024  Date of consult: 09/04/24  Seen by PC in the past:  No    Recommendations:        1047: Pt currently off unit.     1213: returned to bedside, d/w Dr. Tam, residents and TANIYA Flores during rounding. Team currently awaiting PT/OT evals to determine next steps. Dr. Tam asked this NP to hold off on seeing pt.     Reviewed pt's living will on chart - it is a standard state of [place] Living Will stating that if pt is in a terminal condition or a permanently unconscious state that she would not want aggressive measures to prolong her life.     Reason for Consult:         [x]  Goals of Care  []  Code Status Discussion/Advanced Care Planning   []  Psychosocial/Family Support  []  Symptom Management  []  Other (Specify)    Requesting Physician: Dr. Tam    CHIEF COMPLAINT:  leg pain and extremity weakness    History Obtained From:  electronic medical record    History of Present Illness:         Allegra Reina is a 83 y.o. female with PMH of HTN, CKD 3, DVT (10/2020) on Eliquis, CHF, s/p Dual-chamber pacemaker,  who presented with pain in her legs on 8/30/2024. She states that after he appointment with her cardiologist on Wednesday she began feeling extremely fatigued and weak. Initially she was able to get about her house with assistance from her , but it her weakness continuously progressed. Eventually she could not even rise with his help. She states that her legs have become painful as well, though she denies any new swelling or weight gain. She also denies CP, SOB, N/V/D, and Syncope or Falls.     Subjective:         Past Medical History:        Diagnosis Date    CHF (congestive heart failure) (HCC)     Hypertension        Past Surgical History:        Procedure Laterality Date    CHOLECYSTECTOMY      HYSTERECTOMY (CERVIX STATUS UNKNOWN)         Current Medications:    Medications Prior to Admission: 
lb)   08/01/24 77.6 kg (171 lb)         General appearance: alert, appears stated age, cooperative and no distress.  Lying flat in bed.  Lungs: Unlabored breathing, clear to auscultation bilaterally.  Decreased breath sounds in bases  Heart: regular rate and rhythm and S1, S2 normal, ejection systolic murmur aortic area grade 3 /6  Abdomen: soft, non-tender; bowel sounds normal  Extremities: no cyanosis, mild edema.  Legs wrapped.  Skin: Skin color, texture, turgor normal.  Neurologic: No focal deficits. A, O x 3       Labs:     Recent Labs     08/30/24  1616 08/31/24  0414     --    K 4.4  --    BUN 40*  --    CREATININE 2.4*  --      --    CO2 24  --    GLUCOSE 110*  --    CALCIUM 9.4  --    MG  --  2.20     Recent Labs     08/30/24  1616 08/31/24  0414   WBC 6.9 7.2   HGB 12.3 11.9*   HCT 37.4 36.4    168   MCV 94.6 95.6           Imaging:     I personally reviewed imaging studies including CXR, Stress test, TTE/THEODORA.    Last ECG (if available) - I have reviewed EKG with the following interpretation  Ventricular paced rhythm    Telemetry: Personally interpreted  Ventricular paced    Last Stress (if available):    Last TTE/THEODORA(if available): 2019  Suboptimal image quality. Definity contrast administered. Left ventricular   cavity size is normal. There is mild concentric left ventricular   hypertrophy. Overall left ventricular systolic function appears normal with   an estimated ejection fraction of 55-60% based off the parasternal images.   Cannot exclude regional wall motion abnormalities secondary to poor   endocardial visualization. Diastolic filling parameters suggests grade II   diastolic dysfunction.   Aortic valve leaflets appear thickened at the annulus. Individual aortic   valve leaflets are not clearly visualized. No evidence of aortic valve   regurgitation. No evidence of aortic valve stenosis.   There is mild tricuspid regurgitation present.      Assessment / Plan:     Leg pain and

## 2024-09-05 VITALS
WEIGHT: 157.85 LBS | BODY MASS INDEX: 26.3 KG/M2 | HEART RATE: 80 BPM | DIASTOLIC BLOOD PRESSURE: 68 MMHG | HEIGHT: 65 IN | SYSTOLIC BLOOD PRESSURE: 112 MMHG | TEMPERATURE: 98 F | RESPIRATION RATE: 16 BRPM | OXYGEN SATURATION: 95 %

## 2024-09-05 LAB
ALBUMIN SERPL-MCNC: 3.4 G/DL (ref 3.4–5)
ANION GAP SERPL CALCULATED.3IONS-SCNC: 13 MMOL/L (ref 3–16)
BASOPHILS # BLD: 0.1 K/UL (ref 0–0.2)
BASOPHILS NFR BLD: 1 %
BUN SERPL-MCNC: 64 MG/DL (ref 7–20)
CALCIUM SERPL-MCNC: 8.6 MG/DL (ref 8.3–10.6)
CHLORIDE SERPL-SCNC: 98 MMOL/L (ref 99–110)
CO2 SERPL-SCNC: 24 MMOL/L (ref 21–32)
CREAT SERPL-MCNC: 3.1 MG/DL (ref 0.6–1.2)
DEPRECATED RDW RBC AUTO: 13 % (ref 12.4–15.4)
EOSINOPHIL # BLD: 0.1 K/UL (ref 0–0.6)
EOSINOPHIL NFR BLD: 2.4 %
GFR SERPLBLD CREATININE-BSD FMLA CKD-EPI: 14 ML/MIN/{1.73_M2}
GLUCOSE SERPL-MCNC: 123 MG/DL (ref 70–99)
HCT VFR BLD AUTO: 34.2 % (ref 36–48)
HGB BLD-MCNC: 11.4 G/DL (ref 12–16)
LYMPHOCYTES # BLD: 1.4 K/UL (ref 1–5.1)
LYMPHOCYTES NFR BLD: 24.4 %
MAGNESIUM SERPL-MCNC: 2.2 MG/DL (ref 1.8–2.4)
MCH RBC QN AUTO: 31.8 PG (ref 26–34)
MCHC RBC AUTO-ENTMCNC: 33.4 G/DL (ref 31–36)
MCV RBC AUTO: 95.2 FL (ref 80–100)
MONOCYTES # BLD: 0.9 K/UL (ref 0–1.3)
MONOCYTES NFR BLD: 15 %
NEUTROPHILS # BLD: 3.3 K/UL (ref 1.7–7.7)
NEUTROPHILS NFR BLD: 57.2 %
PHOSPHATE SERPL-MCNC: 4.4 MG/DL (ref 2.5–4.9)
PLATELET # BLD AUTO: 179 K/UL (ref 135–450)
PMV BLD AUTO: 9.7 FL (ref 5–10.5)
POTASSIUM SERPL-SCNC: 4.1 MMOL/L (ref 3.5–5.1)
RBC # BLD AUTO: 3.59 M/UL (ref 4–5.2)
SODIUM SERPL-SCNC: 135 MMOL/L (ref 136–145)
WBC # BLD AUTO: 5.8 K/UL (ref 4–11)

## 2024-09-05 PROCEDURE — 6370000000 HC RX 637 (ALT 250 FOR IP)

## 2024-09-05 PROCEDURE — 97530 THERAPEUTIC ACTIVITIES: CPT

## 2024-09-05 PROCEDURE — 6370000000 HC RX 637 (ALT 250 FOR IP): Performed by: INTERNAL MEDICINE

## 2024-09-05 PROCEDURE — 36415 COLL VENOUS BLD VENIPUNCTURE: CPT

## 2024-09-05 PROCEDURE — 6360000002 HC RX W HCPCS

## 2024-09-05 PROCEDURE — 2580000003 HC RX 258: Performed by: INTERNAL MEDICINE

## 2024-09-05 PROCEDURE — 83735 ASSAY OF MAGNESIUM: CPT

## 2024-09-05 PROCEDURE — 92526 ORAL FUNCTION THERAPY: CPT

## 2024-09-05 PROCEDURE — 85025 COMPLETE CBC W/AUTO DIFF WBC: CPT

## 2024-09-05 PROCEDURE — 80069 RENAL FUNCTION PANEL: CPT

## 2024-09-05 PROCEDURE — 2580000003 HC RX 258

## 2024-09-05 PROCEDURE — 97535 SELF CARE MNGMENT TRAINING: CPT

## 2024-09-05 RX ORDER — LIDOCAINE 4 G/G
2 PATCH TOPICAL DAILY
Status: DISCONTINUED | OUTPATIENT
Start: 2024-09-05 | End: 2024-09-05 | Stop reason: HOSPADM

## 2024-09-05 RX ORDER — CEFUROXIME AXETIL 250 MG/1
250 TABLET ORAL 2 TIMES DAILY
DISCHARGE
Start: 2024-09-05 | End: 2024-09-09

## 2024-09-05 RX ORDER — ASPIRIN 81 MG/1
81 TABLET, CHEWABLE ORAL DAILY
Status: DISCONTINUED | OUTPATIENT
Start: 2024-09-05 | End: 2024-09-05 | Stop reason: HOSPADM

## 2024-09-05 RX ORDER — 0.9 % SODIUM CHLORIDE 0.9 %
500 INTRAVENOUS SOLUTION INTRAVENOUS ONCE
Status: COMPLETED | OUTPATIENT
Start: 2024-09-05 | End: 2024-09-05

## 2024-09-05 RX ORDER — ATORVASTATIN CALCIUM 10 MG/1
10 TABLET, FILM COATED ORAL DAILY
DISCHARGE
Start: 2024-09-05

## 2024-09-05 RX ORDER — CINACALCET 30 MG/1
30 TABLET, FILM COATED ORAL
Qty: 30 TABLET | Refills: 5 | Status: SHIPPED | OUTPATIENT
Start: 2024-09-06

## 2024-09-05 RX ORDER — HYDRALAZINE HYDROCHLORIDE 25 MG/1
25 TABLET, FILM COATED ORAL EVERY 8 HOURS SCHEDULED
DISCHARGE
Start: 2024-09-05

## 2024-09-05 RX ORDER — TORSEMIDE 20 MG/1
20 TABLET ORAL DAILY PRN
DISCHARGE
Start: 2024-09-05

## 2024-09-05 RX ORDER — ATORVASTATIN CALCIUM 10 MG/1
10 TABLET, FILM COATED ORAL DAILY
Status: DISCONTINUED | OUTPATIENT
Start: 2024-09-05 | End: 2024-09-05 | Stop reason: HOSPADM

## 2024-09-05 RX ORDER — CARVEDILOL 6.25 MG/1
6.25 TABLET ORAL 2 TIMES DAILY WITH MEALS
DISCHARGE
Start: 2024-09-05

## 2024-09-05 RX ORDER — ASPIRIN 81 MG/1
81 TABLET, CHEWABLE ORAL DAILY
DISCHARGE
Start: 2024-09-05

## 2024-09-05 RX ADMIN — ACETAMINOPHEN 1000 MG: 325 TABLET ORAL at 00:07

## 2024-09-05 RX ADMIN — WATER 1000 MG: 1 INJECTION INTRAMUSCULAR; INTRAVENOUS; SUBCUTANEOUS at 15:21

## 2024-09-05 RX ADMIN — CARVEDILOL 6.25 MG: 6.25 TABLET, FILM COATED ORAL at 10:43

## 2024-09-05 RX ADMIN — DICLOFENAC SODIUM 4 G: 10 GEL TOPICAL at 15:21

## 2024-09-05 RX ADMIN — SODIUM CHLORIDE 500 ML: 9 INJECTION, SOLUTION INTRAVENOUS at 15:31

## 2024-09-05 RX ADMIN — CHLORDIAZEPOXIDE HYDROCHLORIDE AND CLIDINIUM BROMIDE 1 CAPSULE: 5; 2.5 CAPSULE ORAL at 00:20

## 2024-09-05 RX ADMIN — TRAMADOL HYDROCHLORIDE 50 MG: 50 TABLET ORAL at 10:43

## 2024-09-05 RX ADMIN — ISOSORBIDE MONONITRATE 30 MG: 30 TABLET, EXTENDED RELEASE ORAL at 10:43

## 2024-09-05 RX ADMIN — APIXABAN 2.5 MG: 2.5 TABLET, FILM COATED ORAL at 10:43

## 2024-09-05 ASSESSMENT — PAIN DESCRIPTION - LOCATION
LOCATION: BACK
LOCATION: BACK
LOCATION: KNEE

## 2024-09-05 ASSESSMENT — PAIN DESCRIPTION - ONSET
ONSET: ON-GOING

## 2024-09-05 ASSESSMENT — PAIN DESCRIPTION - ORIENTATION
ORIENTATION: MID
ORIENTATION: MID;RIGHT;LEFT
ORIENTATION: MID

## 2024-09-05 ASSESSMENT — ENCOUNTER SYMPTOMS
CHEST TIGHTNESS: 0
CHOKING: 0
SHORTNESS OF BREATH: 0

## 2024-09-05 ASSESSMENT — PAIN - FUNCTIONAL ASSESSMENT
PAIN_FUNCTIONAL_ASSESSMENT: ACTIVITIES ARE NOT PREVENTED

## 2024-09-05 ASSESSMENT — PAIN SCALES - GENERAL
PAINLEVEL_OUTOF10: 6
PAINLEVEL_OUTOF10: 8
PAINLEVEL_OUTOF10: 8

## 2024-09-05 ASSESSMENT — PAIN DESCRIPTION - DIRECTION
RADIATING_TOWARDS: NO

## 2024-09-05 ASSESSMENT — PAIN DESCRIPTION - FREQUENCY
FREQUENCY: INTERMITTENT

## 2024-09-05 ASSESSMENT — PAIN DESCRIPTION - DESCRIPTORS
DESCRIPTORS: ACHING

## 2024-09-05 ASSESSMENT — PAIN DESCRIPTION - PAIN TYPE
TYPE: ACUTE PAIN;CHRONIC PAIN

## 2024-09-05 NOTE — PLAN OF CARE
Problem: Discharge Planning  Goal: Discharge to home or other facility with appropriate resources  Note: Plan for discharge is home vs rehab.      Problem: Pain  Goal: Verbalizes/displays adequate comfort level or baseline comfort level  9/3/2024 1739 by Ronda Calvillo RN  Note: Medicated for leg pain with tramadol. Tramadol effective for relief.      Problem: Skin/Tissue Integrity  Goal: Absence of new skin breakdown  Description: 1.  Monitor for areas of redness and/or skin breakdown  2.  Assess vascular access sites hourly  3.  Every 4-6 hours minimum:  Change oxygen saturation probe site  4.  Every 4-6 hours:  If on nasal continuous positive airway pressure, respiratory therapy assess nares and determine need for appliance change or resting period.  Note: Gluteal fold and groin red and excoriated. Zinc past applied. Inter dry to red and moist abdominal fold. Heels elevated on pillows. Elastic tubular bandage to BLE, patient will not allow them to be removed. Repositioned every 2 hours. Will monitor.      Problem: Safety - Adult  Goal: Free from fall injury  9/3/2024 1739 by Ronda Calvillo, RN  Note: She is a fall risk. Door open, and bed alarm on. Call light in reach.      Problem: Chronic Conditions and Co-morbidities  Goal: Patient's chronic conditions and co-morbidity symptoms are monitored and maintained or improved  Note: CT of the head negative for any acute bleed. Urine is clearing up. No evidence of DVT on doppler study.      Problem: Cardiovascular - Adult  Goal: Maintains optimal cardiac output and hemodynamic stability  9/3/2024 1739 by Ronda Calvillo, RN  Note: AV paced on the monitor. No chest pain.      Problem: Metabolic/Fluid and Electrolytes - Adult  Goal: Electrolytes maintained within normal limits  Note: Cr 2.5. nephrology following. Started on torsemide.      
  Problem: Discharge Planning  Goal: Discharge to home or other facility with appropriate resources  Outcome: Adequate for Discharge     Problem: Pain  Goal: Verbalizes/displays adequate comfort level or baseline comfort level  Outcome: Adequate for Discharge     Problem: Skin/Tissue Integrity  Goal: Absence of new skin breakdown  Description: 1.  Monitor for areas of redness and/or skin breakdown  2.  Assess vascular access sites hourly  3.  Every 4-6 hours minimum:  Change oxygen saturation probe site  4.  Every 4-6 hours:  If on nasal continuous positive airway pressure, respiratory therapy assess nares and determine need for appliance change or resting period.  Outcome: Adequate for Discharge     Problem: Safety - Adult  Goal: Free from fall injury  Outcome: Adequate for Discharge     Problem: Chronic Conditions and Co-morbidities  Goal: Patient's chronic conditions and co-morbidity symptoms are monitored and maintained or improved  Outcome: Adequate for Discharge     Problem: Respiratory - Adult  Goal: Achieves optimal ventilation and oxygenation  Outcome: Adequate for Discharge     Problem: Cardiovascular - Adult  Goal: Maintains optimal cardiac output and hemodynamic stability  Outcome: Adequate for Discharge     Problem: Cardiovascular - Adult  Goal: Absence of cardiac dysrhythmias or at baseline  Outcome: Adequate for Discharge     Problem: Metabolic/Fluid and Electrolytes - Adult  Goal: Electrolytes maintained within normal limits  Outcome: Adequate for Discharge     Problem: Metabolic/Fluid and Electrolytes - Adult  Goal: Hemodynamic stability and optimal renal function maintained  Outcome: Adequate for Discharge     
  Problem: Discharge Planning  Goal: Discharge to home or other facility with appropriate resources  Outcome: Progressing     Problem: Pain  Goal: Verbalizes/displays adequate comfort level or baseline comfort level  9/4/2024 1602 by Sheyla Burrell RN  Outcome: Progressing    Problem: Skin/Tissue Integrity  Goal: Absence of new skin breakdown  Description: 1.  Monitor for areas of redness and/or skin breakdown  2.  Assess vascular access sites hourly  3.  Every 4-6 hours minimum:  Change oxygen saturation probe site  4.  Every 4-6 hours:  If on nasal continuous positive airway pressure, respiratory therapy assess nares and determine need for appliance change or resting period.  Outcome: Progressing     Problem: Safety - Adult  Goal: Free from fall injury  9/4/2024 1602 by Sheyla Burrell RN  Outcome: Progressing     Problem: Chronic Conditions and Co-morbidities  Goal: Patient's chronic conditions and co-morbidity symptoms are monitored and maintained or improved  Outcome: Progressing     Problem: Respiratory - Adult  Goal: Achieves optimal ventilation and oxygenation  9/4/2024 1602 by Sheyla Burrell RN  Outcome: Progressing     Problem: Cardiovascular - Adult  Goal: Maintains optimal cardiac output and hemodynamic stability  9/4/2024 1602 by Sheyla Burrell RN  Outcome: Progressing     Problem: Cardiovascular - Adult  Goal: Absence of cardiac dysrhythmias or at baseline  Outcome: Progressing     Problem: Metabolic/Fluid and Electrolytes - Adult  Goal: Electrolytes maintained within normal limits  Outcome: Progressing     Problem: Metabolic/Fluid and Electrolytes - Adult  Goal: Hemodynamic stability and optimal renal function maintained  Outcome: Progressing     
  Problem: Discharge Planning  Goal: Discharge to home or other facility with appropriate resources  Outcome: Progressing     Problem: Pain  Goal: Verbalizes/displays adequate comfort level or baseline comfort level  Outcome: Progressing     Problem: Skin/Tissue Integrity  Goal: Absence of new skin breakdown  Description: 1.  Monitor for areas of redness and/or skin breakdown  2.  Assess vascular access sites hourly  3.  Every 4-6 hours minimum:  Change oxygen saturation probe site  4.  Every 4-6 hours:  If on nasal continuous positive airway pressure, respiratory therapy assess nares and determine need for appliance change or resting period.  Outcome: Progressing     Problem: Safety - Adult  Goal: Free from fall injury  Outcome: Progressing   Safety precaution in place call light in reach ill june to monitor.  
  Problem: SLP Adult - Impaired Swallowing  Goal: By Discharge: Advance to least restrictive diet without signs or symptoms of aspiration for planned discharge setting.  See evaluation for individualized goals.  Outcome: Progressing     
  Problem: Safety - Adult  Goal: Free from fall injury  Outcome: Progressing  Note:  Remains free from falls, bed in low position, call light in reach, bed alarm monitoring for safety.     Problem: Pain  Goal: Verbalizes/displays adequate comfort level or baseline comfort level  Outcome: Progressing  Note:  Per patient request, PRN Tramadol 50 mg PO at 2156 and PRN Tylenol 1,000 mg PO at 2156 helpful for c/o generalized and bilateral leg pain.     Problem: Respiratory - Adult  Goal: Achieves optimal ventilation and oxygenation  Outcome: Progressing  Note:  O2 94 - 95% on Room Air.     Problem: Cardiovascular - Adult  Goal: Maintains optimal cardiac output and hemodynamic stability  Outcome: Progressing  Note:  1) AV-Paced rate 98 at 2203; and, 2) AV-Paced rate 64 at 0518.     
  Problem: Safety - Adult  Goal: Free from fall injury  Outcome: Progressing  Note:  Remains free from falls, bed in low position, call light in reach, bed alarm monitoring for safety.     Problem: Pain  Goal: Verbalizes/displays adequate comfort level or baseline comfort level  Outcome: Progressing  Note:  Scheduled Tylenol and Tramadol per order for bilateral leg and generalized discomfort.     Problem: Respiratory - Adult  Goal: Achieves optimal ventilation and oxygenation  Outcome: Progressing  Note:  O2 95 to 96% on Room Air.     Problem: Cardiovascular - Adult  Goal: Maintains optimal cardiac output and hemodynamic stability  Outcome: Progressing  Note:  1) V-paced rate 67 at 2202; and 2) AV-paced rate 72 at 2217.     
Pain controlled with PRN pain medication. No new skin issues noted. Patient remains free from falls and injury.  Problem: Pain  Goal: Verbalizes/displays adequate comfort level or baseline comfort level  Outcome: Progressing     Problem: Skin/Tissue Integrity  Goal: Absence of new skin breakdown  Description: 1.  Monitor for areas of redness and/or skin breakdown  2.  Assess vascular access sites hourly  3.  Every 4-6 hours minimum:  Change oxygen saturation probe site  4.  Every 4-6 hours:  If on nasal continuous positive airway pressure, respiratory therapy assess nares and determine need for appliance change or resting period.  Outcome: Progressing     Problem: Safety - Adult  Goal: Free from fall injury  Outcome: Progressing     
results for input(s): \"CLUR\", \"LABCREA\", \"PROTEINUR\", \"NAUR\" in the last 72 hours.      IMAGING:  XR CHEST PORTABLE   Final Result      Possible pulmonary edema pattern. Pneumonia not excluded.      Electronically signed by Arnol Santana      Vascular duplex lower extremity venous bilateral    (Results Pending)         Medical Decision Making:  The following items were considered in medical decision making:  Discussion of patient care with other providers  Reviewed clinical lab tests  Reviewed radiology tests  Reviewed other diagnostic tests/interventions    Will be discussed w/  Primary team     Thank you for allowing us to participate in care of Allegra SANDOVAL Reina   Feel free to contact me,     Rafal Kapadia MD   [facility]  Office : [phone] or through [employer]  Fax :[phone]

## 2024-09-05 NOTE — PROGRESS NOTES
Speech Language Pathology  Facility/Department:[address] TELEMETRY  Bedside Swallow Evaluation                                                       Name: Allegra Reina  : 1940  MRN: [id number]    Patient Diagnosis(es):   Patient Active Problem List    Diagnosis Date Noted    Uncontrolled hypertension 10/06/2017    Bilateral lower extremity edema     Hypervolemia 2024    CHF (congestive heart failure), NYHA class I, acute on chronic, combined (HCC) 2024    Murmur, cardiac 2024    Pacemaker 2019    Acute kidney injury superimposed on CKD (HCC) 2019    Syncope and collapse     Bradycardia 2019    Complete heart block (HCC)     History of pulmonary embolism 2018    CKD (chronic kidney disease) stage 4, GFR 15-29 ml/min ([facility]) 2018    Chronic diastolic congestive heart failure (HCC) 2017    Hyperkalemia 10/29/2017    Acute on chronic congestive heart failure (HCC) 10/03/2017       Past Medical History:   Diagnosis Date    CHF (congestive heart failure) ([facility])     Hypertension      Past Surgical History:   Procedure Laterality Date    CHOLECYSTECTOMY      HYSTERECTOMY (CERVIX STATUS UNKNOWN)         Reason for Referral:  Allegra Reina  was referred for a Speech Therapy evaluation to assess swallow function.    History of Present Illness  Per admitting H&P: 2024  '83 y.o. female with a PMHx significant for, HTN, CKD 3, DVT (10/2020) on Eliquis, CHF, s/p Dual-chamber pacemaker, who presented to the ED on 24 with pain in her legs. She states that after he appointment with her cardiologist on Wednesday she began feeling extremely fatigued and weak. Initially she was able to get about her house with assistance from her , but it her weakness continuously progressed. Eventually she could not even rise with his help. She states that her legs have become painful as well, though she denies any new swelling or weight gain. She 
            Speech Language Pathology  Facility/Department:[address] TELEMETRY  Dysphagia treatment                                                     Name: Allegra Reina  : 1940  MRN: [id number]    Patient Diagnosis(es):   Patient Active Problem List    Diagnosis Date Noted    Uncontrolled hypertension 10/06/2017    Bilateral lower extremity edema     Nonrheumatic aortic valve stenosis 2024    Leg weakness, bilateral 2024    Hypervolemia 2024    CHF (congestive heart failure), NYHA class I, acute on chronic, combined ([facility]) 2024    Murmur, cardiac 2024    Pacemaker 2019    Acute kidney injury superimposed on CKD ([facility]) 2019    Syncope and collapse     Bradycardia 2019    Complete heart block ([facility])     History of pulmonary embolism 2018    CKD (chronic kidney disease) stage 4, GFR 15-29 ml/min ([facility]) 2018    Acute on chronic diastolic heart failure ([facility]) 2017    Hyperkalemia 10/29/2017    Acute on chronic congestive heart failure ([facility]) 10/03/2017       Past Medical History:   Diagnosis Date    CHF (congestive heart failure) ([facility])     Hypertension      Past Surgical History:   Procedure Laterality Date    CHOLECYSTECTOMY      HYSTERECTOMY (CERVIX STATUS UNKNOWN)       Reason for Referral:  Allegra Reina  was referred for a Speech Therapy evaluation to assess swallow function.    History of Present Illness  Per admitting H&P: 2024  '83 y.o. female with a PMHx significant for, HTN, CKD 3, DVT (10/2020) on Eliquis, CHF, s/p Dual-chamber pacemaker, who presented to the ED on 24 with pain in her legs. She states that after he appointment with her cardiologist on Wednesday she began feeling extremely fatigued and weak. Initially she was able to get about her house with assistance from her , but it her weakness continuously progressed. Eventually she could not even rise with his help. She states that her legs have become 
      Internal Medicine Progress Note    Patient Name: Allegra Reina   Patient : 1940   Date: 2024   Admit Date: 2024     CC: Leg Pain and Extremity Weakness       Interval History     Patient seen at bedside.  No new complaints overnight.  Her leg pain is stable and unchanged.  Still having weakness in the right arm and right leg.  She also was noted to be having slightly more difficulty eating.  Physical therapy, Occupational Therapy and speech attempted to see patient today she declined to work with them.  Patient reendorsing that she does not want any imaging for possible stroke.      ROS   Review of Systems   Constitutional:  Negative for appetite change, fatigue and fever.   HENT: Negative.     Respiratory:  Negative for choking, chest tightness and shortness of breath.    Cardiovascular:  Negative for chest pain, palpitations and leg swelling.   Genitourinary:  Negative for dysuria.   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 Negative for speech difficulty, light-headedness, numbness and headaches.   Hematological: Negative.    Psychiatric/Behavioral: Negative.            Objective     I/Os:  I/O last 3 completed shifts:  In: 480 [P.O.:480]  Out: 1325 [Urine:1325]      Vital Signs:  Patient Vitals for the past 8 hrs:   BP Temp Temp src Pulse Resp SpO2 Weight   24 0604 103/63 -- -- 64 16 -- 71.6 kg (157 lb 13.6 oz)   24 2357 (!) 156/62 98.5 °F (36.9 °C) Oral 65 18 92 % --       Physical Exam:  Physical Exam  Constitutional:       General: She is not in acute distress.     Appearance: She is not diaphoretic.   HENT:      Head: Normocephalic and atraumatic.   Eyes:      Extraocular Movements: Extraocular movements intact.      Pupils: Pupils are equal, round, and reactive to light.   Cardiovascular:      Rate and Rhythm: Normal rate.      Heart sounds: Murmur heard.   Pulmonary:      Breath sounds: Rales (minimal 
      Internal Medicine Progress Note    Patient Name: Allegra Reina   Patient : 1940   Date: 2024   Admit Date: 2024     CC: Leg Pain and Extremity Weakness       Interval History     Pt seen at bedside. She feels her legs were improved with the lasix and the swelling is down. She did endorse some soreness after working with physical therapy.   THEODORA planned for tomorrow per cardiology. NPO @ midnight.           ROS   Review of Systems   Constitutional:  Negative for appetite change, fatigue and fever.   HENT: Negative.     Respiratory:  Negative for choking, chest tightness and shortness of breath.    Cardiovascular:  Positive for leg swelling. Negative for chest pain and palpitations.   Genitourinary:  Negative for dysuria.   Neurological:  Negative for light-headedness and headaches.   Hematological: Negative.    Psychiatric/Behavioral: Negative.            Objective     I/Os:  I/O last 3 completed shifts:  In: 790 [P.O.:780; I.V.:10]  Out: 1750 [Urine:1750]      Vital Signs:  Patient Vitals for the past 8 hrs:   BP Temp Temp src Pulse Resp SpO2 Weight   24 0818 (!) 145/60 97.7 °F (36.5 °C) Oral 64 18 95 % --   24 0429 (!) 147/58 97.8 °F (36.6 °C) Oral 62 18 96 % --   24 0421 -- -- -- -- -- -- 73.8 kg (162 lb 11.2 oz)       Physical Exam:  Physical Exam  Constitutional:       General: She is not in acute distress.     Appearance: She is not diaphoretic.   HENT:      Head: Normocephalic and atraumatic.   Eyes:      Extraocular Movements: Extraocular movements intact.      Pupils: Pupils are equal, round, and reactive to light.   Cardiovascular:      Rate and Rhythm: Normal rate.      Heart sounds: Murmur heard.   Pulmonary:      Breath sounds: Rales (minimal bilaterally) present.   Abdominal:      Tenderness: There is abdominal tenderness (slight suprapubic tenderness). There is no guarding.   Musculoskeletal:         General: Tenderness (ROSSY from the knee down.) present.    
      Internal Medicine Progress Note    Patient Name: Allegra Reina   Patient : 1940   Date: 9/3/2024   Admit Date: 2024     CC: Leg Pain and Extremity Weakness       Interval History     Pt seen at bedside. Was able to get up in the chair for several hours yesterday. Some brief nausea with morning medications on an empty stomach but no nausea during encounter.   Legs still sore but reduced compared to initial presentation  Dark red urine was noted in purewick canister yesterday evening. It is clear today.  THEODORA planned for today @ 1300   SBP goal of 130-140      ROS   Review of Systems   Constitutional:  Negative for appetite change, fatigue and fever.   HENT: Negative.     Respiratory:  Negative for choking, chest tightness and shortness of breath.    Cardiovascular:  Negative for chest pain, palpitations and leg swelling.   Genitourinary:  Negative for dysuria.   Neurological:  Negative for light-headedness and headaches.   Hematological: Negative.    Psychiatric/Behavioral: Negative.            Objective     I/Os:  I/O last 3 completed shifts:  In: 360 [P.O.:360]  Out: 1350 [Urine:1350]      Vital Signs:  Patient Vitals for the past 8 hrs:   BP Temp Temp src Pulse Resp SpO2 Weight   24 0704 (!) 194/67 97.9 °F (36.6 °C) Axillary 65 16 95 % --   24 0237 -- -- -- -- -- -- 74.5 kg (164 lb 3.9 oz)   24 0020 102/69 97.7 °F (36.5 °C) Oral 83 18 94 % --       Physical Exam:  Physical Exam  Constitutional:       General: She is not in acute distress.     Appearance: She is not diaphoretic.   HENT:      Head: Normocephalic and atraumatic.   Eyes:      Extraocular Movements: Extraocular movements intact.      Pupils: Pupils are equal, round, and reactive to light.   Cardiovascular:      Rate and Rhythm: Normal rate.      Heart sounds: Murmur heard.   Pulmonary:      Breath sounds: Rales (minimal bilaterally) present.   Abdominal:      Tenderness: There is abdominal tenderness (slight 
     Internal Medicine Progress Note    Date: 9/1/2024   Patient: Allegra Reina   Admit Date: 8/30/2024  Hospital Day: 2      CC: Leg Pain and Extremity Weakness        Interval Hx   Some belly pain last night. Giving suppository has large BM this a.m.     Sitting comfortably in bed, stated that she came in due to bilateral leg pain does not feel that she is fluid overloaded.  Her legs are fairly edematous.  She does states she has some slight pain upon urination UA on admission did show concern for urinary tract infection.  She does have an extensive list of allergies to medication.  She has had ceftriaxone in the past in 2019 without any noted events per chart review, will prescribe ceftriaxone while inpatient for 5-day course and monitor for any symptoms of reaction.     Decreased lasix to 20 daily  Echo showed  mildly reduced EF of 45-50, grade II diastolic duyscfunction and  with moderate to severe AS    Probable d/c tomorrow     HPI:   Per note  \"Ms. Allegra Reina is a 83 y.o. female with a PMHx significant for, HTN, CKD 3, DVT (10/2020) on Eliquis, CHF, s/p Dual-chamber pacemaker, who presented to the ED on 8/30/24 with pain in her legs. She states that after he appointment with her cardiologist on Wednesday she began feeling extremely fatigued and weak. Initially she was able to get about her house with assistance from her , but it her weakness continuously progressed. Eventually she could not even rise with his help. She states that her legs have become painful as well, though she denies any new swelling or weight gain. She also denies CP, SOB, N/V/D, and Syncope or Falls.        ED Course:  On arrival to the ED, she was found to be chronically ill-appearing but in no acute distress, with vital signs notable for mild hypertension with systolics in the 170s, but otherwise within normal limits.  Labs were significant for BNP elevated to 16,000 slightly elevated from patient's most recent lab 
   Renal Progress Note  Subjective:   Admit Date: 8/30/2024       Assessment/Plan     # CKD4, eGF ~ 14-20   # HFrEF w/ exacerbation - mild  # HTN- not controlled   # Hx of PE   # Mod -severe AS   # BMD : Pi ok, Ca ok      Plan:  Cr fluctuation suspect sec to hemodynamic changes  Holding Torsemide. Take if weight is > 175 lbs  Resumed home Sensipar   4. Strict I/O  5. Avoid nephrotoxins   6. BP control- added Coreg, reduce Hydralazine , goal -140   7. BMP daily   8. BMD- monitor      History of Present Ilness:    Allegra Reina is a 83 y.o. female with  with a PMHx significant for, HTN, CKD 4, DVT (10/2020) on Eliquis, CHF, s/p Dual-chamber pacemaker, who presented to the ED on 8/30/24 with pain in her legs, weakness and fatigue.She denies any new swelling or weight gain. She also denies CP, SOB, N/V/D, and Syncope or Falls.  On arrival to the ED, she was found to be chronically ill-appearing but in no acute distress, with vital signs notable for mild hypertension with systolics in the 170s, but otherwise within normal limits.  Labs remarkalble for BNP 16k , UA- WBC, Cr 2.4 (base line),lytes ok CBC ok (no anemia)   CXR read as possible pul edema  BP elevated , no hypoxic   Pt was diagnsoed with CHF exacerbation   Started on o.v lasix   So far documented UOp 0.7 L      Interval Hx:    Labile BP's  Cr slightly worse today  Lytes stable      DIET ADULT DIET; Dysphagia - Soft and Bite Sized; 4 carb choices (60 gm/meal); Low Fat/Low Chol/High Fiber/2 gm Na; Low Sodium (2 gm); 1500 ml  Medications:   Scheduled Meds:   cefTRIAXone  1,000 mg IntraMUSCular Once    [Held by provider] torsemide  20 mg Oral Daily    cinacalcet  30 mg Oral Once per day on Monday Wednesday Friday    acetaminophen  1,000 mg Oral Q12H    hydrALAZINE  25 mg Oral 3 times per day    carvedilol  6.25 mg Oral BID WC    apixaban  2.5 mg Oral BID    isosorbide mononitrate  30 mg Oral Daily    sodium chloride flush  5-40 mL IntraVENous 2 times 
   Renal Progress Note  Subjective:   Admit Date: 8/30/2024       Assessment/Plan     # CKD4, eGF ~ 20   # HFrEF w/ exacerbation- mild - started on lasix I.v   # HTN- not controlled   # Hx of PE   # Mod -severe AS      Plan:  PT has not edema. Swithc to lasix oral 20 mg daily   2. Strict I/O  3. Avoid nephrotoxins   4. BP control- added Coreg, if better- stop hydralazine   5. BMP daily   6. BMD- check Pi, PTH , Vit D      History of Present Ilness:    Allegra Reina is a 83 y.o. female with  with a PMHx significant for, HTN, CKD 4, DVT (10/2020) on Eliquis, CHF, s/p Dual-chamber pacemaker, who presented to the ED on 8/30/24 with pain in her legs, weakness and fatigue.She denies any new swelling or weight gain. She also denies CP, SOB, N/V/D, and Syncope or Falls.  On arrival to the ED, she was found to be chronically ill-appearing but in no acute distress, with vital signs notable for mild hypertension with systolics in the 170s, but otherwise within normal limits.  Labs remarkalble for BNP 16k , UA- WBC, Cr 2.4 (base line),lytes ok CBC ok (no anemia)   CXR read as possible pul edema  BP elevated , no hypoxic   Pt was diagnsoed with CHF exacerbation   Started on o.v lasix   So far documented UOp 0.7 L      Interval Hx  Pt had difficulties with HTN and BP readings   Pt denies CP/SOB/palpitations/abdominal pain/N/V.   BP elevated   Cr up to 2.6<--2.3   Lytes ok         DIET ADULT DIET; Dysphagia - Soft and Bite Sized; 4 carb choices (60 gm/meal); Low Fat/Low Chol/High Fiber/2 gm Na; Low Sodium (2 gm); 1500 ml  Medications:   Scheduled Meds:   carvedilol  6.25 mg Oral BID WC    furosemide  40 mg IntraVENous Daily    hydrALAZINE  25 mg Oral 3 times per day    cefTRIAXone (ROCEPHIN) IV  1,000 mg IntraVENous Q24H    apixaban  2.5 mg Oral BID    isosorbide mononitrate  30 mg Oral Daily    sodium chloride flush  5-40 mL IntraVENous 2 times per day     Continuous Infusions:   sodium chloride         Labs:  CBC:   Recent 
  Speech-Language Pathology    Attempted dysphagia therapy follow-up, however pt currently NPO for THEODORA. Will check back at later time as able.    Maria G Aj, SLP, [id number]  Ph. # [phone]      
  Speech-Language Pathology    Received evaluation order, reviewed chart, spoke with RN. Attempted to work with patient, however she is currently having procedure completed. Will check back at later time as able.    Maria G Aj, SLP, [id number]  Ph. # [phone]      
4 Eyes Admission Assessment     I agree as the admission nurse that 2 RN's have performed a thorough Head to Toe Skin Assessment on the patient. ALL assessment sites listed below have been assessed on admission.       Areas assessed by both nurses:   [x]   Head, Face, and Ears   [x]   Shoulders, Back, and Chest  [x]   Arms, Elbows, and Hands   [x]   Coccyx, Sacrum, and Ischum  [x]   Legs, Feet, and Heels        Does the Patient have Skin Breakdown?  Yes a wound was noted on the Admission Assessment and an LDA was Initiated documentation include the Rox-wound, Wound Assessment, Measurements, Dressing Treatment, Drainage, and Color\", Observed some scattered bruising. Swelling in the legs now wrapped with ace bandage. Redness and skin tear in the groin area and sacrum.        Herve Prevention initiated:  Yes   Wound Care Orders initiated:  Yes      [facility] nurse consulted for Pressure Injury (Stage 3,4, Unstageable, DTI, NWPT, and Complex wounds):  No      Nurse 1 eSignature: Electronically signed by Yuniel Quezada RN on 8/30/24 at 11:29 PM EDT    **SHARE this note so that the co-signing nurse is able to place an eSignature**    Nurse 2 eSignature: Electronically signed by Mary Drew RN on 8/31/24 at 3:52 AM EDT   
Cardiology Consult Service  Daily Progress Note        Admit Date:  8/30/2024  Primary cardiologist: Dr Mendoza    Reason for Consultation/Chief Complaint: r/o AHF    Subjective:     Patient is an 83-year-old woman, previously following with Dr Gamez, with history of obesity, HTN, CKD 4, DVT (10/2020) currently on Eliquis, high-grade block status post MDT dual-chamber pacer 7/16/2019, currently 100% V paced, CODE STATUS DNR.     Echo 07/2019: Mild LVH, LVEF 60%, grade 2 diastolic dysfunction, normal RV, mild TR.  Compared to echo 10/2017, no changes.     No recent ischemic evaluation     Device interrogation 6/27/2024: A-paced 50%, V paced 100%, PVC less than 0.1%.  EP recommended a repeat echocardiogram to assess LV function in the setting of chronic RV pacing.  Patient politely declined.    Patient was seen by me on 8/29 in the clinic and was doing well.  However the next day 8/30 patient was extremely weak and unable to stand on her feet.  BP was severely elevated at 170/91 mmHg, otherwise vital signs were normal.  Creatinine of 2.4 (baseline), proBNP 16,000, high-sensitivity troponin 82, 68, CBC normal, influenza and COVID test negative, UA suggestive of UTI.  ECG consistent with sinus rhythm, ventricular paced.  Patient was admitted for UTI, generalized weakness, severe hypertension, rule out acute heart failure.  She was given IV Lasix and cardiology was consulted.  She was seen by Dr. Gacria during the weekend who asked me to take over care today.  On IV Lasix, creatinine deteriorated at 2.8, Lasix stopped, creatinine improving at 2.5 today.    Echo 8/31/2024: Normal LV size, LVEF 45-50%, hypokinesis of the distal segments of the left ventricle, grade 2 diastolic dysfunction, no LV apical clot, mild RV dysfunction, aortic valve cusp moderate thickening with reduced excursion and with moderate to severe stenosis, Doppler evaluation suboptimal, cannot exclude low gradient aortic stenosis, mild MR/TR, 
Cardiology Consult Service  Daily Progress Note        Admit Date:  8/30/2024  Primary cardiologist: Dr Mendoza    Reason for Consultation/Chief Complaint: r/o AHF    Subjective:     Patient is an 83-year-old woman, previously following with Dr Gamez, with history of obesity, HTN, CKD 4, DVT (10/2020) currently on Eliquis, high-grade block status post MDT dual-chamber pacer 7/16/2019, currently 100% V paced, CODE STATUS DNR.     Echo 07/2019: Mild LVH, LVEF 60%, grade 2 diastolic dysfunction, normal RV, mild TR.  Compared to echo 10/2017, no changes.     No recent ischemic evaluation     Device interrogation 6/27/2024: A-paced 50%, V paced 100%, PVC less than 0.1%.  EP recommended a repeat echocardiogram to assess LV function in the setting of chronic RV pacing.  Patient politely declined.    Patient was seen by me on 8/29 in the clinic and was doing well.  However the next day 8/30 patient was extremely weak and unable to stand on her feet.  BP was severely elevated at 170/91 mmHg, otherwise vital signs were normal.  Creatinine of 2.4 (baseline), proBNP 16,000, high-sensitivity troponin 82, 68, CBC normal, influenza and COVID test negative, UA suggestive of UTI.  ECG consistent with sinus rhythm, ventricular paced.  Patient was admitted for UTI, generalized weakness, severe hypertension, rule out acute heart failure.  She was given IV Lasix and cardiology was consulted.  She was seen by Dr. Garcia during the weekend who asked me to take over care today.  On IV Lasix, creatinine deteriorated at 2.8, Lasix stopped, creatinine improving at 2.5 today.    Echo 8/31/2024: Normal LV size, LVEF 45-50%, hypokinesis of the distal segments of the left ventricle, grade 2 diastolic dysfunction, no LV apical clot, mild RV dysfunction, aortic valve cusp moderate thickening with reduced excursion and with moderate to severe stenosis, Doppler evaluation suboptimal, cannot exclude low gradient aortic stenosis, mild MR/TR, 
Clinical Pharmacy Consult Note  Medication History     Admit Date: 8/30/2024    List of of current medications patient is taking is complete. Home Medication list in EPIC updated to reflect changes noted below.    Source of information: Patient, patient's , dispense history     Patient's home pharmacy: [facility] [address] [place] [address] [address] [phone] - [place] [phone]      Changes made to medication list:   Medications removed: (include reason, ex: therapy completed, patient no longer taking, etc.)  Senna 8.6 mg tablet - patient not taking   Medications added:   Clotrimazole-betamethasone 1-0.05% cream   Medication doses adjusted:   Acetaminophen 500 mg tablet - dose instructions added per patient's  \"take 1 tablet by mouth in the morning with tramadol 50 mg, take 2 tablets by mouth in the afternoon, take 1 tablet by mouth in the evening with tramadol 50 mg\"  Chlordiazepoxide-clidinium 5-2.5 mg capsule - dose frequency changed from 4 times a day to 3 times a day per patient and dispense history   Fexofenadine HCl 60 mg tablet - dose changed from 180 mg to 60 mg per patient   Promethazine-DM - dose instructions added per dispense history \"take 5 mL by mouth nightly as needed\"   Other notes:   Lidocaine 5% patch - patient reports not using a patch every day, only as needed. Patient reports she cuts a patch in half and applies one half to each knee.     Current Outpatient Medications   Medication Instructions    acetaminophen (TYLENOL) 2,000 mg, Oral, DAILY, Take 1 tablet by mouth in the morning with tramadol 50 mg, 2 tablets by mouth in the afternoon, and 1 tablet by mouth at night with tramadol 50 mg     apixaban (ELIQUIS) 2.5 mg, Oral, 2 TIMES DAILY    chlordiazePOXIDE-clidinium (LIBRAX) 5-2.5 MG per capsule 1 capsule, Oral, 3 TIMES DAILY PRN    cinacalcet (SENSIPAR) 30 mg, Oral, DAILY    clotrimazole-betamethasone (LOTRISONE) 1-0.05 % cream Topical, 2 TIMES DAILY PRN, Apply 
Cr fluctuation noted   Gfr stable   Off torsemide for now   BNP 17 k     Take torsemide if wt above 175 lbs     
[facility]  Cardiology Inpatient Consult Service  Daily Progress Note        Admit Date:  8/30/2024    Referring Physician: Saravanan Rankin MD      Assessment & Plan:     Moderate to severe aortic stenosis-clinically second heart sound is heard but soft.  Has mild LV dysfunction.  The apical wall motion abnormality could be due to stress cardiomyopathy or coronary artery disease.    Had long discussion with the patient and family regarding THEODORA, risks, benefits, alternatives.  Discussed about evaluation for TAVR.  Patient agreeable to proceed with THEODORA tomorrow.  Will keep n.p.o. past midnight.  Timing to be decided in a.m.    Volume overload secondary to chronic midrange heart failure, chronic renal failure.  Leg swelling is decreased.  Continue to monitor renal function.  Creatinine is trending up.  Will hold Lasix in AM.  Patient is going to be n.p.o. for THEODORA.    Heart failure with midrange ejection fraction-not on ACE inhibitor/ARB/ARNI due to renal failure.  Will consider SGLT2 inhibitor on follow-up once other cardiac medications have been titrated.  Continue carvedilol.  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  Continue to monitor.  Blood pressure improving.    May need rehab placement at discharge.    Subjective:   Interval history:  Feels tired and exhausted.  Tearful.    Medications:   carvedilol  6.25 mg Oral BID WC    furosemide  20 mg Oral Daily    apixaban  2.5 mg Oral BID    hydrALAZINE  37.5 mg Oral 3 times per day    cefTRIAXone (ROCEPHIN) IV  1,000 mg IntraVENous Q24H    isosorbide mononitrate  30 mg Oral Daily    sodium chloride flush  5-40 mL IntraVENous 2 times per day       IV drips:   sodium chloride         PRN:  chlordiazePOXIDE-clidinium, acetaminophen **OR** acetaminophen, sodium chloride flush, sodium chloride, ondansetron **OR** 
D:  Patient is NPO for THEODORA.  Should patient receive AM PO medication (Hydralazine and Tylenol)?  Please advise.  Thank you.  A:  Notified provider, Balbir Wiley MD.  R:  Per provider above, \"Yes, that's fine.\"  
Occupational Therapy  Facility/Department: [address]  Occupational Therapy Initial Assessment and Treatment Note     Name: Allegra Reina  : 1940  MRN: [id number]  Date of Service: 2024    Discharge Recommendations:  Subacute/Skilled Nursing Facility, 24 hour supervision or assist, Home with Home health OT  OT Equipment Recommendations  Equipment Needed: No       Patient Diagnosis(es): The primary encounter diagnosis was Hypervolemia, unspecified hypervolemia type. Diagnoses of Congestive heart failure, unspecified HF chronicity, unspecified heart failure type (HCC), Acute cystitis without hematuria, and Heart murmur were also pertinent to this visit.  Past Medical History:  has a past medical history of CHF (congestive heart failure) (HCC) and Hypertension.  Past Surgical History:  has a past surgical history that includes Hysterectomy and Cholecystectomy.    Treatment Diagnosis: impaired ADLs/ functional mobility 2/2 CHF      Assessment  Performance deficits / Impairments: Decreased functional mobility ;Decreased ADL status;Decreased endurance  Assessment: Pt from home with spouse. Unable to determine PLOF - inconsistent report from pt / spouse. Pt appears to be functional well below baseline level.  Pt needing increased time and encouragement for OOB this date.  Pt would benefit from ongoing inpt OT at SNF. If home will need 24hr assist / HHOT.   Will follow as inpt.  Treatment Diagnosis: impaired ADLs/ functional mobility 2/2 CHF  Decision Making: Medium Complexity  REQUIRES OT FOLLOW-UP: Yes  Activity Tolerance  Activity Tolerance: Patient limited by fatigue  Activity Tolerance Comments: Pt needing increased encouragement for OOB/ transfers.     Plan  Occupational Therapy Plan  Times Per Week: 2-5x  Times Per Day: Once a day  Current Treatment Recommendations: Functional mobility training, Endurance training, Safety education & training, Patient/Caregiver education & training, 
Occupational Therapy  Facility/Department: [address] TELEMETRY  Daily Treatment Note  NAME: Allegra Reina  : 1940  MRN: [id number]    Date of Service: 2024    Discharge Recommendations:  Subacute/Skilled Nursing Facility  OT Equipment Recommendations  Equipment Needed: No      Patient Diagnosis(es): The primary encounter diagnosis was Hypervolemia, unspecified hypervolemia type. Diagnoses of Congestive heart failure, unspecified HF chronicity, unspecified heart failure type (HCC), Acute cystitis without hematuria, Heart murmur, and Nonrheumatic aortic valve stenosis were also pertinent to this visit.     Assessment   Assessment: Pt tolerates session well this date with overall mod assist x 2 for supine to sit transfer and STS to/from 2WW and hortencia steady from EOB/chair. Extended time this date practicing balance exercises to promote trunk control. Pt would benefit from cont skilled OT to max progress towards PLOF.  Activity Tolerance: Patient limited by fatigue;Patient limited by pain;Treatment limited secondary to medical complications  Discharge Recommendations: Subacute/Skilled Nursing Facility  Equipment Needed: No     Plan  Occupational Therapy Plan  Times Per Week: 5-7x  Times Per Day: Once a day  Current Treatment Recommendations: Functional mobility training;Endurance training;Safety education & training;Patient/Caregiver education & training;Self-Care / ADL    Restrictions   Position Activity Restriction  Other position/activity restrictions: up with assistance    Subjective  Subjective  Subjective: Pt presents supine in bed with  present upon arrival. Pt is agreeable and motivated for session. Pt has c/o discomfort in hand and R knee when initially assisted with extension however no further c/o this session.  Orientation  Overall Orientation Status: Within Functional Limits  Cognition  Overall Cognitive Status: WFL  Arousal/Alertness: Appears intact  Following Commands: Follows 
Occupational Therapy  Facility/Department: [address] TELEMETRY  Daily Treatment Note  NAME: Allegra Reina  : 1940  MRN: [id number]    Date of Service: 2024    Discharge Recommendations:  Subacute/Skilled Nursing Facility    Equipment needs if discharging home  Hospital bed, BSC, lift equipment with 2 person asssit         Patient Diagnosis(es): The primary encounter diagnosis was Hypervolemia, unspecified hypervolemia type. Diagnoses of Congestive heart failure, unspecified HF chronicity, unspecified heart failure type (HCC), Acute cystitis without hematuria, Heart murmur, and Nonrheumatic aortic valve stenosis were also pertinent to this visit.     Assessment   Pt requiring heavy encouragment and increased time for all tasks limited by pain and decreased R UE and LE function. Pt requiring assist x2 and use of hortencia stedy for transfer from EOB to chair. Sitting balance requiring min to Max A. ADL grooming tasks completed seated in recliner chair with heavy encouragement and SBA. Pt would benefit from inpt OT upon discharge. If pt would discharge home pt will need 24hr assist with assist 2 using lift equpment. Continue OT per POC     Discharge Recommendations: Subacute/Skilled Nursing Facility     Plan  Occupational Therapy Plan  Times Per Week: 5-7x  Times Per Day: Once a day  Current Treatment Recommendations: Functional mobility training;Endurance training;Safety education & training;Patient/Caregiver education & training;Self-Care / ADL    Restrictions  Position Activity Restriction  Other position/activity restrictions: up with assistance    Subjective  Orientation  Overall Orientation Status: Within Functional Limits  Orientation Level: Oriented to place;Oriented to person;Oriented to situation  Cognition  Overall Cognitive Status: WFL  Arousal/Alertness: Appears intact  Following Commands: Follows multistep commands with increased time;Follows multistep commands with repitition  Attention 
Occupational Therapy and Physical Therapy    HOLD    Attempt made to see pt for therapy. RN recommending therapy HOLD at this time due to state CT scan ordered and transport arriving soon. Will attempt to see pt later as is medically appropriate. Continue OT per POC.     Sonja BERNAL/VINCENT Hughes PT, DPT, NCS, CSRS     
Palliative Care Chart Review  and Check in Note:     NAME:  Allegra Reina  Admit Date: 8/30/2024  Hospital Day:  Hospital Day: 7   Current Code status: DNR-CCA    Palliative care is continuing to following Ms. Reina for symptom management,  and goals of care discussion as needed. Patient's chart reviewed today 9/5/24.      Met with pt and  at the bedside, introduced palliative care. They are now accepting that pt likely had a stroke. They still do not want pt to undergo MRI. They are accepting of SNF at discharge with the ultimate goal of getting pt home. I confirmed pt's code status as DNRCCA.       The following are the currently established goals/code status, and Symptom management.     Goals of care: Goals are rehabilitative    Code status: DNRCCA    Discharge plan: advanced SNF when medically ready for discharge      AMADEO Robb CNP  09/05/24  10:52 AM    
Patient seen and examined doing ok today still with right arm and leg deficit.  Discussed with son at bedside, discussed with  and patient they do not want further evaluation with neurology or MRI.  Await therapy notes, continue with current care and plan to discharge.    
Physical Therapy    Pt off the floor for testing.   Currently unavailable for PT.  Will continue per plan of care as pt available.     Desire Escobar, PTA #[id number]      
Physical Therapy  Daily Treatment Note    Discharge Recommendation:  Skilled Nursing Facility  Equipment Needs:  Defer to next level of care    Assessment:  Pt requiring assist x 2 for bed mobility and transfers at this time. Able to stand at walker with assist for balance and R hand , but unable to take a step with either foot. Pt demonstrating significant weakness R UE/LE. Also with decreased balance in sitting and standing. Mobility slow, weak and effortful. Fatigued after getting up to chair. Pt from home with . She is currently functioning below her baseline. Would benefit from continued IP PT at D/C prior to returning home.  Pt hopeful for home, but agreeable to SNF if strength does not significantly improve prior to D/C.     Chart Reviewed: Yes     Other position/activity restrictions: up with assistance   Additional Pertinent Hx: Ms. Allegra Reina is a 83 y.o. female with a PMHx significant for, HTN, CKD 3, DVT (10/2020) on Eliquis, CHF, s/p Dual-chamber pacemaker, who presented to the ED on 8/30/24 with pain in her legs. She states that after he appointment with her cardiologist on Wednesday she began feeling extremely fatigued and weak.      Diagnosis: CHF   Treatment Diagnosis: impaired mobility 2/2 CHF    Subjective: Pt in bed initially.  present.   Pt reporting that she still has weakness R UE/LE, but feels this has improved some.  stating \"If you can just get her to take a few steps, I could take her home.\" Doesn't want pt to have to go to facility for rehab, but open to this if pt still needing significant assist at D/C.     Pain: Some c/o discomfort to touch B lower legs. Not rated. Positioned for comfort at end of session (reclined in chair.)    Objective:    Bed mobility  Supine to sit: Dependent (Mod assist x 2), HOB up  Scooting: Max assist to EOB    Transfers  Sit to stand: Dependent (Mod assist x 2) from bed to walker; Dependent (Mod assist x 2) from bed to Agustina 
Physical Therapy/Occupational Therapy  Refused    PT/OT attempted to see for therapy, pt adamantly declining stating she \"choked this morning\" - RN and SLP aware. Pt states she had a rough morning and \"maybe tomorrow.\" PT/OT encouraged pt to participate edu on importance of mobility if pt's goal is to return home at DC. Pt again declining, will follow up later today vs tomorrow as able.    Tabatha Hughes PT, DPT, NCS, CSRS   Sonja VELEZ    
Pt is A&Ox4 and discharging to rehab. Pt's IV and telemetry removed. Pt's questions and concerns addressed with RN. Pt left with all personal belongings and discharge instructions. Pt transported to facility via EMS. Report called to facility.  
Pt off the floor to CT scan.  
Received pt from ED to room 6394. A/Ox4 with c/o dull pain in the legs. Scores 6/10. Oriented pt to room, staff and call light. Belongings with family. Vitals taken. Made comfortable in bed. Plan of care ongoing.  
Speech pathology  Attempt    Chart reviewed, d/w ANIKET Carrion. Attempted to re-assess pt however pt declined upright position.  Pt denied speech/cog issues. Pt with decreased insight into impairments. Will follow up as pt participates and schedule allows      KODI CHAMBERS M.S./CCC-SLP #[id number]  Pg. # [phone]      
[facility]  Cardiology Inpatient Consult Service  Daily Progress Note        Admit Date:  8/30/2024    Referring Physician: Saravanan Rankin MD      Assessment & Plan:     Moderate to severe aortic stenosis-clinically second heart sound is heard but soft.  Has mild LV dysfunction.  The apical wall motion abnormality could be due to stress cardiomyopathy or coronary artery disease.  Discussed in detail about aortic stenosis, natural history, treatment options.  Recommend THEODORA for further evaluation of severity of aortic stenosis in case patient is willing to go for TAVR evaluation if the AS is severe.  If patient decides that she will not go for valve surgery regardless, would not recommend THEODORA.  If THEODORA shows severe AS, patient will need coronary angiogram and CT TAVR protocol which would be at higher risk due to renal insufficiency.   states that she did not tolerate fentanyl and Versed in the past when she had her pacemaker    Volume overload secondary to chronic midrange heart failure, chronic renal failure.  Leg swelling is decreased.  Agree with plans to reduce diuretic dosing.  Discussed salt and fluid restriction.    Heart failure with midrange ejection fraction-not on ACE inhibitor/ARB/ARNI due to renal failure.  Will consider SGLT2 inhibitor on follow-up once other cardiac medications have been titrated.  Agree with starting carvedilol.  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  Assess response to Coreg.    Subjective:   Interval history:  Leg swelling is improved.  Feels tired.    Medications:   carvedilol  6.25 mg Oral BID WC    furosemide  20 mg Oral Daily    hydrALAZINE  25 mg Oral 3 times per day    cefTRIAXone (ROCEPHIN) IV  1,000 mg IntraVENous Q24H    apixaban  2.5 mg Oral BID    isosorbide mononitrate  30 mg Oral Daily    sodium chloride flush  
MD    Plan  Physical Therapy Plan  General Plan:  (2-5)  Current Treatment Recommendations: Strengthening, Balance training, Functional mobility training, Transfer training, Safety education & training, Patient/Caregiver education & training, Therapeutic activities  Safety Devices  Type of Devices: Call light within reach, Chair alarm in place, Left in chair, Nurse notified    Restrictions  Position Activity Restriction  Other position/activity restrictions: up with assistance     Subjective  Pain: Pt preports generalized pain ~8/10 - RN aware  General  Chart Reviewed: Yes  Patient assessed for rehabilitation services?: Yes  Additional Pertinent Hx: Ms. Allegra Reina is a 83 y.o. female with a PMHx significant for, HTN, CKD 3, DVT (10/2020) on Eliquis, CHF, s/p Dual-chamber pacemaker, who presented to the ED on 8/30/24 with pain in her legs. She states that after he appointment with her cardiologist on Wednesday she began feeling extremely fatigued and weak.  Family / Caregiver Present: Yes (spouse present)  Referring Practitioner: Iam Solorio MD  Diagnosis: CHF  Follows Commands: Within Functional Limits  General Comment  Comments: The pt presents supine in bed and willing to work with therapist.  Subjective  Subjective: \"I am tired today.\"         Social/Functional History  Social/Functional History  Lives With: Spouse  Type of Home: House  Home Layout: Multi-level, Able to Live on Main level with bedroom/bathroom  Home Access: Stairs to enter with rails  Entrance Stairs - Number of Steps: goes in garage without stairs and then chair lift to get upstairs to main floor and then another chair lift in the home for the next level  Bathroom Shower/Tub: Tub/Shower unit (sponge bathing)  Bathroom Toilet: Handicap height (bar on tub for leverage)  Home Equipment: Walker - 4-Wheeled, Wheelchair - Manual, Cane, Lift chair  Has the patient had two or more falls in the past year or any fall with injury in the 
intact.      Pupils: Pupils are equal, round, and reactive to light.   Cardiovascular:      Rate and Rhythm: Normal rate.      Heart sounds: Murmur heard.   Pulmonary:      Breath sounds: Rales (minimal bilaterally) present.   Abdominal:      Palpations: Abdomen is soft.      Tenderness: There is no abdominal tenderness (slight suprapubic tenderness). There is no guarding.   Musculoskeletal:         General: Tenderness (ROSSY from the knee down.) present.      Right lower leg: No edema.      Left lower leg: No edema.   Skin:     Coloration: Skin is not jaundiced.      Findings: No erythema.   Neurological:      General: No focal deficit present.      Mental Status: She is alert and oriented to person, place, and time.      Cranial Nerves: Facial asymmetry (Facial droop involving the eye on the right.  Patient has a long history of Bell's palsy.) present.      Sensory: Sensation is intact.      Comments: Right upper arm: 1/5  strength, 1/5 elbow flexion and extension strength, 1/5 shoulder extension.  Left upper arm: 5/5  strength, 5/5 elbow flexion and extension strength, 5/5 shoulder extension.  Right lower le/5 plantar and dorsiflexion strength, 1/5 hip flexion strength  Left lower le out of 5 plantar and dorsiflexion strength, 5 out of 5 hip flexion strength.  Shoulder shrug strength 5 out of 5 bilaterally.   Psychiatric:         Mood and Affect: Mood normal.         Behavior: Behavior normal.         Thought Content: Thought content normal.         Medications:   torsemide  20 mg Oral Daily    cinacalcet  30 mg Oral Once per day on     acetaminophen  1,000 mg Oral Q12H    hydrALAZINE  25 mg Oral 3 times per day    carvedilol  6.25 mg Oral BID WC    apixaban  2.5 mg Oral BID    cefTRIAXone (ROCEPHIN) IV  1,000 mg IntraVENous Q24H    isosorbide mononitrate  30 mg Oral Daily    sodium chloride flush  5-40 mL IntraVENous 2 times per day       sodium chloride      
limiting behaviors    Plan  Physical Therapy Plan  General Plan: 5-7 times per week  Current Treatment Recommendations: Strengthening, Balance training, Functional mobility training, Transfer training, Safety education & training, Patient/Caregiver education & training, Therapeutic activities  Safety Devices  Type of Devices: Call light within reach, Chair alarm in place, Left in chair, Nurse notified, All fall risk precautions in place, Gait belt, Heels elevated for pressure relief    Restrictions  Position Activity Restriction  Other position/activity restrictions: up with assistance     Subjective  General Comment  Comments: pt cleared to see for therapy by RN. Seen for re-eval this date 2/2 new onset of R sided weakness.  Subjective  Subjective: pt semisupine in bed on entry, agreeable to getting up to chair with coaxing. C/o pain to BLEs with mobility, positioned for comfort at end session.         Social/Functional History  Social/Functional History  Lives With: Spouse  Type of Home: House  Home Layout: Multi-level, Able to Live on Main level with bedroom/bathroom  Home Access: Stairs to enter with rails  Entrance Stairs - Number of Steps: goes in garage without stairs and then chair lift to get upstairs to main floor and then another chair lift in the home for the next level  Bathroom Shower/Tub: Tub/Shower unit (sponge bathing)  Bathroom Toilet: Handicap height (bar on tub for leverage)  Home Equipment: Walker - 4-Wheeled, Wheelchair - Manual, Cane, Lift chair  Has the patient had two or more falls in the past year or any fall with injury in the past year?: No  Receives Help From: Family  ADL Assistance: Needs assistance (pt needs assist with all from )  Homemaking Assistance: Needs assistance (spouse does all)  Homemaking Responsibilities: No  Ambulation Assistance: Needs assistance (gait belt and 4ww)  Transfer Assistance: Independent  Active : No  Occupation: Retired  Additional Comments: Pt/ 
(164 lb 3.9 oz)   08/28/24 77.1 kg (170 lb)   08/01/24 77.6 kg (171 lb)      24HR INTAKE/OUTPUT:    Intake/Output Summary (Last 24 hours) at 9/4/2024 1005  Last data filed at 9/4/2024 0702  Gross per 24 hour   Intake 240 ml   Output 1325 ml   Net -1085 ml       AAX3   NECK: supple, no JVD  Cardiovascular:  S1, S2 without m/r/g  Respiratory:  CTA B without w/r/r  Abdomen: soft, nt  Ext: + LE edema  Skin Warm and dry     Assessment and Plan:       IMAGING:  CT HEAD WO CONTRAST   Final Result         1. No intracranial hemorrhage, mass or large acute territorial infarction   2. Age indeterminant small infarct may be present in the left paramedian jennie. Consider MRI for further evaluation. Evaluation of this area is limited by artifact through the posterior fossa.      Electronically signed by Iván Walker MD      Vascular duplex lower extremity venous bilateral   Final Result      XR CHEST PORTABLE   Final Result      Possible pulmonary edema pattern. Pneumonia not excluded.      Electronically signed by Arnol Santana      MRI BRAIN W WO CONTRAST    (Results Pending)   CTA HEAD NECK W CONTRAST    (Results Pending)       Medical Decision Making:  The following items were considered in medical decision making:  Discussion of patient care with other providers  Reviewed clinical lab tests  Reviewed radiology tests  Reviewed other diagnostic tests/interventions  Independent review of radiologic images - reviewed     MALCOLM PonceC   [facility]  Office -[phone]  Ogj-[phone]      I have seen the patient independently from the PA/NP .I discussed the care with the PA/NP . I personally reviewed the HPI, PH, FH, SH, ROS and medications. I repeated pertinent portions of the examination and reviewed the relevant imaging and laboratory data. I agree with the findings, assessment and plan as documented, with the following addendum:        Mervin Merino MD      
Resp 16   Ht 1.651 m (5' 5\")   Wt 73.8 kg (162 lb 11.2 oz)   SpO2 94%   BMI 27.07 kg/m²    Wt Readings from Last 3 Encounters:   09/02/24 73.8 kg (162 lb 11.2 oz)   08/28/24 77.1 kg (170 lb)   08/01/24 77.6 kg (171 lb)      24HR INTAKE/OUTPUT:    Intake/Output Summary (Last 24 hours) at 9/2/2024 1246  Last data filed at 9/2/2024 0958  Gross per 24 hour   Intake 360 ml   Output 1150 ml   Net -790 ml       AAX3   NECK: supple, no JVD  Cardiovascular:  S1, S2 without m/r/g  Respiratory:  CTA B without w/r/r  Abdomen: +bs, soft, nt  Ext: trace   LE edema  Skin Warm and dry   Assessment and Plan:       IMAGING:  XR CHEST PORTABLE   Final Result      Possible pulmonary edema pattern. Pneumonia not excluded.      Electronically signed by Arnol Santana      Vascular duplex lower extremity venous bilateral    (Results Pending)       Medical Decision Making:  The following items were considered in medical decision making:  Discussion of patient care with other providers  Reviewed clinical lab tests  Reviewed radiology tests  Reviewed other diagnostic tests/interventions  Independent review of radiologic images - reviewed     D/w Primary team             Rafal Kapadia MD   [facility]  Office -[phone]  Wgw-[phone]      
ml   Net -320 ml       AAX3   NECK: supple, no JVD  Cardiovascular:  S1, S2 without m/r/g  Respiratory:  CTA B without w/r/r  Abdomen: soft, nt  Ext: + LE edema  Skin Warm and dry     Assessment and Plan:       IMAGING:  XR CHEST PORTABLE   Final Result      Possible pulmonary edema pattern. Pneumonia not excluded.      Electronically signed by Arnol Santana      Vascular duplex lower extremity venous bilateral    (Results Pending)       Medical Decision Making:  The following items were considered in medical decision making:  Discussion of patient care with other providers  Reviewed clinical lab tests  Reviewed radiology tests  Reviewed other diagnostic tests/interventions  Independent review of radiologic images - reviewed     MALCOLM PonceC   [facility]  Office -[phone]  Xxd-[phone]    I have seen the patient independently from the PA/NP .I discussed the care with the PA/NP . I personally reviewed the HPI, PH, FH, SH, ROS and medications. I repeated pertinent portions of the examination and reviewed the relevant imaging and laboratory data. I agree with the findings, assessment and plan as documented, with the following addendum:        Mervin Merino MD          
94, Respirations: 18, SpO2: 95 %     Procedures     Procedures      ED Course     ED Course as of 08/30/24 2016   Fri Aug 30, 2024   1910 82 y/o with CKD, CHF c/o LE edema and pain in the setting of UTI, pending walk test and abx  [DW]      ED Course User Index  [DW] Siva Green MD       The patient was given the following medications:  Orders Placed This Encounter   Medications    traMADol (ULTRAM) tablet 50 mg    acetaminophen (TYLENOL) tablet 650 mg    furosemide (LASIX) injection 40 mg       CONSULTS:  None    
Liquids / meds PO- if further s/s of aspiration emerge, or there is respiratory decline,  make NPO until further evaluated by SLP, as pt agreeable  Upright position  Encourage oral hygiene 2 x daily  MBS if/when pt agreeable    Discharge Recommendation: ongoing treatment indicated, pending pt participation  Discussed with RN, Sheyla CHAMBERS M.S./CCC-SLP #[id number]  Pg. # [phone]  This document will serve as a discharge summary if patient discharges prior to next visit.    
[facility]     Interpersonal Safety   Housing Stability: Low Risk  (8/30/2024)    Housing Stability Vital Sign     Unable to Pay for Housing in the Last Year: No     Number of Times Moved in the Last Year: 1     Homeless in the Last Year: No        FHx:      Problem Relation Age of Onset    Stroke Mother        ROS:  Review of Systems   Constitutional:  Negative for appetite change, fatigue and fever.   HENT: Negative.     Respiratory:  Negative for choking, chest tightness and shortness of breath.    Cardiovascular:  Positive for leg swelling. Negative for chest pain and palpitations.   Genitourinary:  Positive for dysuria.   Neurological:  Negative for light-headedness and headaches.   Hematological: Negative.    Psychiatric/Behavioral: Negative.            Objective     Vital Signs:  Patient Vitals for the past 8 hrs:   BP Temp Temp src Pulse Resp SpO2 Weight   08/31/24 0803 (!) 179/71 98 °F (36.7 °C) Oral 76 18 96 % --   08/31/24 0605 (!) 169/67 -- -- -- -- -- --   08/31/24 0456 (!) 160/76 -- -- -- -- -- --   08/31/24 0423 -- -- -- -- -- -- 77.6 kg (171 lb)   08/31/24 0420 (!) 196/63 -- -- -- -- -- --   08/31/24 0313 (!) 178/67 98.1 °F (36.7 °C) Oral 64 17 96 % --         Physical Exam  Constitutional:       General: She is not in acute distress.     Appearance: She is not diaphoretic.   HENT:      Head: Normocephalic and atraumatic.   Eyes:      Extraocular Movements: Extraocular movements intact.      Pupils: Pupils are equal, round, and reactive to light.   Cardiovascular:      Rate and Rhythm: Normal rate.      Heart sounds: Murmur heard.   Pulmonary:      Breath sounds: Rales (minimal bilaterally) present.   Abdominal:      Tenderness: There is abdominal tenderness (slight suprapubic tenderness). There is no guarding.   Musculoskeletal:         General: Swelling and tenderness (in legs) present.      Right lower leg: Edema present.      Left lower leg: Edema present.   Skin:     Coloration: Skin

## 2024-09-05 NOTE — DISCHARGE INSTR - COC
patient):  {CHP DME Belongings:[id number]}    RN SIGNATURE:  {Esignature:[id number]}    CASE MANAGEMENT/SOCIAL WORK SECTION    Inpatient Status Date: 8/30/24    Readmission Risk Assessment Score:  Readmission Risk              Risk of Unplanned Readmission:  19           Discharging to Facility/ Agency     [facility]    Services Available   Skilled Nursing      Address   [address] [address]             Contact Information    [phone]          / signature: Electronically signed by Alaina Hilario on 9/5/24 at 3:52 PM EDT    PHYSICIAN SECTION    Prognosis: Good    Condition at Discharge: Stable    Rehab Potential (if transferring to Rehab): Good    Recommended Labs or Other Treatments After Discharge:   Continue with antibiotics  Torsemide 10 mg if weight between 175 and 180; torsemide 20 mg if greater than 180.  Hold if at weight  Pt/ot speech therapy  Consider ppi if needed patient is on asa and eliquis     Physician Certification: I certify the above information and transfer of Allegra Reina  is necessary for the continuing treatment of the diagnosis listed and that she requires Skilled Nursing Facility for greater 30 days.     Update Admission H&P: Changes in H&P as follows - adjustment in bp meds    PHYSICIAN SIGNATURE:  Electronically signed by Mikey Tam MD on 9/5/24 at 3:31 PM EDT

## 2024-09-05 NOTE — CARE COORDINATION
CM met with pt's spouse at bedside. Pt up in chair and did not participate in conversation. CM discussed how pt did with therapy today and possible needs for dc. Pt's , Siva stated he was aware pt may need rehab. He would like to see how she does tomorrow as he feels she will improve. Cm discussed previous referral to [facility] and that they can accept pt when medically ready.  
SW following for discharge coordination.     Family still discussing discharge plan at this time, Home vs. SNF as patient does not want to go to SNF but family is discussing as they feel she needs more care than they can manage at home.      Patient's son Siva ([phone])had requested SW to send referral to [facility] fast [facility] for services post discharge. ANGELICA faxed referral to [facility] fast track @ [phone]    Electronically signed by KRISTIAN Kwan on 9/3/2024 at 2:05 PM       4:24 PM     SW met with patient and family at bedside to get additional updates and discuss discharge plan.     SW provided family with SNF list to look through and select referrals to send.     Patient and spouse are interested in [facility] at this time and would like a referral sent.     SW sent referral to Candi Nava with [facility] [phone]. Candi will review patient and call SW back with updates.     SW following.     Electronically signed by KRISTIAN Kwan on 9/3/2024 at 4:26 PM       
others, and if so, who? Yes  Plans to Return to Present Housing: Unknown at present  Other Identified Issues/Barriers to RETURNING to current housing: Medical Clearance   Potential Assistance needed at discharge: N/A            Potential DME:    Patient expects to discharge to: Unknown  Plan for transportation at discharge: Other (see comment) (TBD)    Financial    Payor: MEDICARE / Plan: MEDICARE PART A AND B / Product Type: *No Product type* /     Does insurance require precert for SNF: No    Potential assistance Purchasing Medications: No  Meds-to-Beds request:        [id number] - [place], [place] - [address] [phone] - F [phone] [address]  [facility] [address]  Phone: [phone] Fax: [phone]    [id number] [place], [place] - [address] [phone] - F [phone] [address]  [facility] [address]  Phone: [phone] Fax: [phone]      Notes:    Factors facilitating achievement of predicted outcomes: Family support, Caregiver support, Friend support, Motivated, Cooperative, and Pleasant    Barriers to discharge: Medical Clearance    Additional Case Management Notes: Patient is from home with spouse and family support. Patient uses a stair lift to get into the home and has a cane and walker and transport chair that she uses as needed. SW following. Therapy recommending SNF at this time and patient does not want to go to SNF.     The Plan for Transition of Care is related to the following treatment goals of Acute cystitis without hematuria [N30.00]  CHF (congestive heart failure), NYHA class I, acute on chronic, combined (HCC) [I50.43]  Hypervolemia, unspecified hypervolemia type [E87.70]  Congestive heart failure, unspecified HF chronicity, unspecified heart failure type (HCC) [I50.9]    IF APPLICABLE: The Patient and/or patient representative Allegra and her family were provided with a choice of provider and agrees with the 
plan Yes    Freedom of choice list was provided with basic dialogue that supports the patient's individualized plan of care/goals and shares the quality data associated with the providers. Yes    Care Transitions patient: No    Alaina Hilario  [facility]  Case Management Department  Ph: [phone]

## 2024-09-13 ENCOUNTER — ANESTHESIA EVENT (OUTPATIENT)
Dept: ENDOSCOPY | Age: 84
End: 2024-09-13
Payer: MEDICARE

## 2024-09-13 ENCOUNTER — HOSPITAL ENCOUNTER (INPATIENT)
Age: 84
LOS: 3 days | Discharge: HOME | DRG: 378 | End: 2024-09-17
Attending: EMERGENCY MEDICINE | Admitting: INTERNAL MEDICINE
Payer: MEDICARE

## 2024-09-13 ENCOUNTER — ANESTHESIA (OUTPATIENT)
Dept: ENDOSCOPY | Age: 84
End: 2024-09-13
Payer: MEDICARE

## 2024-09-13 DIAGNOSIS — K62.5 RECTAL BLEEDING: Primary | ICD-10-CM

## 2024-09-13 DIAGNOSIS — K92.2 GASTROINTESTINAL HEMORRHAGE, UNSPECIFIED GASTROINTESTINAL HEMORRHAGE TYPE: ICD-10-CM

## 2024-09-13 DIAGNOSIS — G89.29 OTHER CHRONIC PAIN: ICD-10-CM

## 2024-09-13 DIAGNOSIS — K92.1 BLOOD IN STOOL: ICD-10-CM

## 2024-09-13 LAB
ABO + RH BLD: NORMAL
ANION GAP SERPL CALCULATED.3IONS-SCNC: 10 MMOL/L (ref 3–16)
ANTI-XA UNFRAC HEPARIN: >1.1 IU/ML (ref 0.3–0.7)
APTT BLD: 29.2 SEC (ref 22.1–36.4)
BASOPHILS # BLD: 0 K/UL (ref 0–0.2)
BASOPHILS NFR BLD: 0.2 %
BLD GP AB SCN SERPL QL: NORMAL
BUN SERPL-MCNC: 95 MG/DL (ref 7–20)
CALCIUM SERPL-MCNC: 9.6 MG/DL (ref 8.3–10.6)
CHLORIDE SERPL-SCNC: 98 MMOL/L (ref 99–110)
CO2 SERPL-SCNC: 25 MMOL/L (ref 21–32)
CREAT SERPL-MCNC: 3.2 MG/DL (ref 0.6–1.2)
DEPRECATED RDW RBC AUTO: 13.3 % (ref 12.4–15.4)
EOSINOPHIL # BLD: 0.6 K/UL (ref 0–0.6)
EOSINOPHIL NFR BLD: 6.7 %
GFR SERPLBLD CREATININE-BSD FMLA CKD-EPI: 14 ML/MIN/{1.73_M2}
GLUCOSE SERPL-MCNC: 101 MG/DL (ref 70–99)
HCT VFR BLD AUTO: 34.5 % (ref 36–48)
HCT VFR BLD AUTO: 34.9 % (ref 36–48)
HCT VFR BLD AUTO: 35 % (ref 36–48)
HGB BLD-MCNC: 11.3 G/DL (ref 12–16)
HGB BLD-MCNC: 11.6 G/DL (ref 12–16)
HGB BLD-MCNC: 11.8 G/DL (ref 12–16)
INR PPP: 1.22 (ref 0.85–1.15)
LYMPHOCYTES # BLD: 2.9 K/UL (ref 1–5.1)
LYMPHOCYTES NFR BLD: 30.5 %
MCH RBC QN AUTO: 31.8 PG (ref 26–34)
MCHC RBC AUTO-ENTMCNC: 33.9 G/DL (ref 31–36)
MCV RBC AUTO: 93.8 FL (ref 80–100)
MONOCYTES # BLD: 0.8 K/UL (ref 0–1.3)
MONOCYTES NFR BLD: 8.4 %
NEUTROPHILS # BLD: 5.1 K/UL (ref 1.7–7.7)
NEUTROPHILS NFR BLD: 54.2 %
PLATELET # BLD AUTO: 246 K/UL (ref 135–450)
PMV BLD AUTO: 9.6 FL (ref 5–10.5)
POTASSIUM SERPL-SCNC: 5.3 MMOL/L (ref 3.5–5.1)
PROTHROMBIN TIME: 15.6 SEC (ref 11.9–14.9)
RBC # BLD AUTO: 3.73 M/UL (ref 4–5.2)
SODIUM SERPL-SCNC: 133 MMOL/L (ref 136–145)
WBC # BLD AUTO: 9.4 K/UL (ref 4–11)

## 2024-09-13 PROCEDURE — 6360000002 HC RX W HCPCS

## 2024-09-13 PROCEDURE — 7100000010 HC PHASE II RECOVERY - FIRST 15 MIN: Performed by: INTERNAL MEDICINE

## 2024-09-13 PROCEDURE — 85520 HEPARIN ASSAY: CPT

## 2024-09-13 PROCEDURE — 86900 BLOOD TYPING SEROLOGIC ABO: CPT

## 2024-09-13 PROCEDURE — G0378 HOSPITAL OBSERVATION PER HR: HCPCS

## 2024-09-13 PROCEDURE — 85014 HEMATOCRIT: CPT

## 2024-09-13 PROCEDURE — 3700000000 HC ANESTHESIA ATTENDED CARE: Performed by: INTERNAL MEDICINE

## 2024-09-13 PROCEDURE — 36415 COLL VENOUS BLD VENIPUNCTURE: CPT

## 2024-09-13 PROCEDURE — 2580000003 HC RX 258

## 2024-09-13 PROCEDURE — 85730 THROMBOPLASTIN TIME PARTIAL: CPT

## 2024-09-13 PROCEDURE — 86850 RBC ANTIBODY SCREEN: CPT

## 2024-09-13 PROCEDURE — 85018 HEMOGLOBIN: CPT

## 2024-09-13 PROCEDURE — 3609012400 HC EGD TRANSORAL BIOPSY SINGLE/MULTIPLE: Performed by: INTERNAL MEDICINE

## 2024-09-13 PROCEDURE — 85025 COMPLETE CBC W/AUTO DIFF WBC: CPT

## 2024-09-13 PROCEDURE — 0DB38ZX EXCISION OF LOWER ESOPHAGUS, VIA NATURAL OR ARTIFICIAL OPENING ENDOSCOPIC, DIAGNOSTIC: ICD-10-PCS | Performed by: INTERNAL MEDICINE

## 2024-09-13 PROCEDURE — 88305 TISSUE EXAM BY PATHOLOGIST: CPT

## 2024-09-13 PROCEDURE — 2709999900 HC NON-CHARGEABLE SUPPLY: Performed by: INTERNAL MEDICINE

## 2024-09-13 PROCEDURE — 3700000001 HC ADD 15 MINUTES (ANESTHESIA): Performed by: INTERNAL MEDICINE

## 2024-09-13 PROCEDURE — 96374 THER/PROPH/DIAG INJ IV PUSH: CPT

## 2024-09-13 PROCEDURE — 86901 BLOOD TYPING SEROLOGIC RH(D): CPT

## 2024-09-13 PROCEDURE — 6360000002 HC RX W HCPCS: Performed by: EMERGENCY MEDICINE

## 2024-09-13 PROCEDURE — 99223 1ST HOSP IP/OBS HIGH 75: CPT | Performed by: HOSPITALIST

## 2024-09-13 PROCEDURE — 80048 BASIC METABOLIC PNL TOTAL CA: CPT

## 2024-09-13 PROCEDURE — 7100000011 HC PHASE II RECOVERY - ADDTL 15 MIN: Performed by: INTERNAL MEDICINE

## 2024-09-13 PROCEDURE — 85610 PROTHROMBIN TIME: CPT

## 2024-09-13 PROCEDURE — 6370000000 HC RX 637 (ALT 250 FOR IP)

## 2024-09-13 PROCEDURE — 6370000000 HC RX 637 (ALT 250 FOR IP): Performed by: NURSE PRACTITIONER

## 2024-09-13 PROCEDURE — 99285 EMERGENCY DEPT VISIT HI MDM: CPT

## 2024-09-13 RX ORDER — PROPOFOL 10 MG/ML
INJECTION, EMULSION INTRAVENOUS
Status: DISCONTINUED | OUTPATIENT
Start: 2024-09-13 | End: 2024-09-13 | Stop reason: SDUPTHER

## 2024-09-13 RX ORDER — SODIUM CHLORIDE 0.9 % (FLUSH) 0.9 %
5-40 SYRINGE (ML) INJECTION EVERY 12 HOURS SCHEDULED
Status: DISCONTINUED | OUTPATIENT
Start: 2024-09-13 | End: 2024-09-17 | Stop reason: HOSPADM

## 2024-09-13 RX ORDER — PANTOPRAZOLE SODIUM 40 MG/10ML
40 INJECTION, POWDER, LYOPHILIZED, FOR SOLUTION INTRAVENOUS 2 TIMES DAILY
Status: DISCONTINUED | OUTPATIENT
Start: 2024-09-13 | End: 2024-09-14

## 2024-09-13 RX ORDER — SODIUM CHLORIDE 9 MG/ML
INJECTION, SOLUTION INTRAVENOUS PRN
Status: DISCONTINUED | OUTPATIENT
Start: 2024-09-13 | End: 2024-09-17 | Stop reason: HOSPADM

## 2024-09-13 RX ORDER — ONDANSETRON 2 MG/ML
4 INJECTION INTRAMUSCULAR; INTRAVENOUS EVERY 6 HOURS PRN
Status: DISCONTINUED | OUTPATIENT
Start: 2024-09-13 | End: 2024-09-13

## 2024-09-13 RX ORDER — PANTOPRAZOLE SODIUM 40 MG/10ML
80 INJECTION, POWDER, LYOPHILIZED, FOR SOLUTION INTRAVENOUS ONCE
Status: COMPLETED | OUTPATIENT
Start: 2024-09-13 | End: 2024-09-13

## 2024-09-13 RX ORDER — SODIUM CHLORIDE 0.9 % (FLUSH) 0.9 %
5-40 SYRINGE (ML) INJECTION PRN
Status: DISCONTINUED | OUTPATIENT
Start: 2024-09-13 | End: 2024-09-17 | Stop reason: HOSPADM

## 2024-09-13 RX ORDER — TRAMADOL HYDROCHLORIDE 50 MG/1
50 TABLET ORAL EVERY 12 HOURS PRN
Status: DISCONTINUED | OUTPATIENT
Start: 2024-09-13 | End: 2024-09-17 | Stop reason: HOSPADM

## 2024-09-13 RX ORDER — SODIUM CHLORIDE, SODIUM LACTATE, POTASSIUM CHLORIDE, CALCIUM CHLORIDE 600; 310; 30; 20 MG/100ML; MG/100ML; MG/100ML; MG/100ML
INJECTION, SOLUTION INTRAVENOUS
Status: DISCONTINUED | OUTPATIENT
Start: 2024-09-13 | End: 2024-09-13 | Stop reason: SDUPTHER

## 2024-09-13 RX ORDER — ACETAMINOPHEN 325 MG/1
650 TABLET ORAL EVERY 6 HOURS PRN
Status: DISCONTINUED | OUTPATIENT
Start: 2024-09-13 | End: 2024-09-17 | Stop reason: HOSPADM

## 2024-09-13 RX ORDER — ACETAMINOPHEN 650 MG/1
650 SUPPOSITORY RECTAL EVERY 6 HOURS PRN
Status: DISCONTINUED | OUTPATIENT
Start: 2024-09-13 | End: 2024-09-17 | Stop reason: HOSPADM

## 2024-09-13 RX ORDER — LIDOCAINE HYDROCHLORIDE 20 MG/ML
INJECTION, SOLUTION INTRAVENOUS
Status: DISCONTINUED | OUTPATIENT
Start: 2024-09-13 | End: 2024-09-13 | Stop reason: SDUPTHER

## 2024-09-13 RX ORDER — ONDANSETRON 4 MG/1
4 TABLET, ORALLY DISINTEGRATING ORAL EVERY 8 HOURS PRN
Status: DISCONTINUED | OUTPATIENT
Start: 2024-09-13 | End: 2024-09-13

## 2024-09-13 RX ADMIN — PROPOFOL 50 MG: 10 INJECTION, EMULSION INTRAVENOUS at 14:25

## 2024-09-13 RX ADMIN — ACETAMINOPHEN 650 MG: 325 TABLET ORAL at 22:54

## 2024-09-13 RX ADMIN — PANTOPRAZOLE SODIUM 80 MG: 40 INJECTION, POWDER, FOR SOLUTION INTRAVENOUS at 06:47

## 2024-09-13 RX ADMIN — TRAMADOL HYDROCHLORIDE 50 MG: 50 TABLET ORAL at 22:11

## 2024-09-13 RX ADMIN — SODIUM CHLORIDE, PRESERVATIVE FREE 10 ML: 5 INJECTION INTRAVENOUS at 21:39

## 2024-09-13 RX ADMIN — LIDOCAINE HYDROCHLORIDE 100 MG: 20 INJECTION, SOLUTION INTRAVENOUS at 14:25

## 2024-09-13 RX ADMIN — PANTOPRAZOLE SODIUM 40 MG: 40 INJECTION, POWDER, FOR SOLUTION INTRAVENOUS at 21:39

## 2024-09-13 RX ADMIN — PROPOFOL 25 MG: 10 INJECTION, EMULSION INTRAVENOUS at 14:28

## 2024-09-13 RX ADMIN — SODIUM CHLORIDE, SODIUM LACTATE, POTASSIUM CHLORIDE, AND CALCIUM CHLORIDE: .6; .31; .03; .02 INJECTION, SOLUTION INTRAVENOUS at 14:19

## 2024-09-13 ASSESSMENT — PAIN DESCRIPTION - ORIENTATION
ORIENTATION: RIGHT;LEFT
ORIENTATION: OTHER (COMMENT)
ORIENTATION: OTHER (COMMENT)

## 2024-09-13 ASSESSMENT — PAIN DESCRIPTION - ONSET
ONSET: ON-GOING
ONSET: ON-GOING

## 2024-09-13 ASSESSMENT — PAIN DESCRIPTION - PAIN TYPE
TYPE: ACUTE PAIN;CHRONIC PAIN
TYPE: CHRONIC PAIN

## 2024-09-13 ASSESSMENT — PAIN - FUNCTIONAL ASSESSMENT
PAIN_FUNCTIONAL_ASSESSMENT: PREVENTS OR INTERFERES SOME ACTIVE ACTIVITIES AND ADLS
PAIN_FUNCTIONAL_ASSESSMENT: NONE - DENIES PAIN
PAIN_FUNCTIONAL_ASSESSMENT: ACTIVITIES ARE NOT PREVENTED
PAIN_FUNCTIONAL_ASSESSMENT: 0-10

## 2024-09-13 ASSESSMENT — LIFESTYLE VARIABLES
HOW OFTEN DO YOU HAVE A DRINK CONTAINING ALCOHOL: NEVER
HOW MANY STANDARD DRINKS CONTAINING ALCOHOL DO YOU HAVE ON A TYPICAL DAY: PATIENT DOES NOT DRINK

## 2024-09-13 ASSESSMENT — PAIN SCALES - GENERAL
PAINLEVEL_OUTOF10: 8
PAINLEVEL_OUTOF10: 3
PAINLEVEL_OUTOF10: 6
PAINLEVEL_OUTOF10: 3
PAINLEVEL_OUTOF10: 3
PAINLEVEL_OUTOF10: 7
PAINLEVEL_OUTOF10: 6
PAINLEVEL_OUTOF10: 0

## 2024-09-13 ASSESSMENT — PAIN DESCRIPTION - LOCATION
LOCATION: GENERALIZED
LOCATION: BUTTOCKS;KNEE;LEG
LOCATION: GENERALIZED

## 2024-09-13 ASSESSMENT — PAIN DESCRIPTION - DESCRIPTORS
DESCRIPTORS: ACHING;DISCOMFORT
DESCRIPTORS: ACHING;DISCOMFORT
DESCRIPTORS: ACHING

## 2024-09-13 ASSESSMENT — PAIN DESCRIPTION - DIRECTION: RADIATING_TOWARDS: NO

## 2024-09-13 ASSESSMENT — PAIN DESCRIPTION - FREQUENCY
FREQUENCY: CONTINUOUS
FREQUENCY: CONTINUOUS

## 2024-09-13 ASSESSMENT — ENCOUNTER SYMPTOMS: SHORTNESS OF BREATH: 1

## 2024-09-14 PROBLEM — K92.2 GI BLEED: Status: ACTIVE | Noted: 2024-09-14

## 2024-09-14 LAB
ANION GAP SERPL CALCULATED.3IONS-SCNC: 13 MMOL/L (ref 3–16)
BASOPHILS # BLD: 0 K/UL (ref 0–0.2)
BASOPHILS NFR BLD: 0.4 %
BUN SERPL-MCNC: 86 MG/DL (ref 7–20)
CALCIUM SERPL-MCNC: 9.9 MG/DL (ref 8.3–10.6)
CHLORIDE SERPL-SCNC: 100 MMOL/L (ref 99–110)
CO2 SERPL-SCNC: 22 MMOL/L (ref 21–32)
CREAT SERPL-MCNC: 2.9 MG/DL (ref 0.6–1.2)
DEPRECATED RDW RBC AUTO: 13 % (ref 12.4–15.4)
EOSINOPHIL # BLD: 0.6 K/UL (ref 0–0.6)
EOSINOPHIL NFR BLD: 7 %
GFR SERPLBLD CREATININE-BSD FMLA CKD-EPI: 15 ML/MIN/{1.73_M2}
GLUCOSE SERPL-MCNC: 78 MG/DL (ref 70–99)
HCT VFR BLD AUTO: 34 % (ref 36–48)
HGB BLD-MCNC: 11.2 G/DL (ref 12–16)
IRON SATN MFR SERPL: 18 % (ref 15–50)
IRON SERPL-MCNC: 41 UG/DL (ref 37–145)
LYMPHOCYTES # BLD: 2.6 K/UL (ref 1–5.1)
LYMPHOCYTES NFR BLD: 29.7 %
MCH RBC QN AUTO: 31.1 PG (ref 26–34)
MCHC RBC AUTO-ENTMCNC: 33 G/DL (ref 31–36)
MCV RBC AUTO: 94.1 FL (ref 80–100)
MONOCYTES # BLD: 0.8 K/UL (ref 0–1.3)
MONOCYTES NFR BLD: 9.8 %
NEUTROPHILS # BLD: 4.6 K/UL (ref 1.7–7.7)
NEUTROPHILS NFR BLD: 53.1 %
PLATELET # BLD AUTO: 236 K/UL (ref 135–450)
PMV BLD AUTO: 9.5 FL (ref 5–10.5)
POTASSIUM SERPL-SCNC: 5.2 MMOL/L (ref 3.5–5.1)
RBC # BLD AUTO: 3.62 M/UL (ref 4–5.2)
SODIUM SERPL-SCNC: 135 MMOL/L (ref 136–145)
TIBC SERPL-MCNC: 223 UG/DL (ref 260–445)
WBC # BLD AUTO: 8.6 K/UL (ref 4–11)

## 2024-09-14 PROCEDURE — 80048 BASIC METABOLIC PNL TOTAL CA: CPT

## 2024-09-14 PROCEDURE — 6370000000 HC RX 637 (ALT 250 FOR IP): Performed by: NURSE PRACTITIONER

## 2024-09-14 PROCEDURE — 96361 HYDRATE IV INFUSION ADD-ON: CPT

## 2024-09-14 PROCEDURE — 6360000002 HC RX W HCPCS

## 2024-09-14 PROCEDURE — 85025 COMPLETE CBC W/AUTO DIFF WBC: CPT

## 2024-09-14 PROCEDURE — 2580000003 HC RX 258

## 2024-09-14 PROCEDURE — 2580000003 HC RX 258: Performed by: INTERNAL MEDICINE

## 2024-09-14 PROCEDURE — 6360000002 HC RX W HCPCS: Performed by: NURSE PRACTITIONER

## 2024-09-14 PROCEDURE — 1200000000 HC SEMI PRIVATE

## 2024-09-14 PROCEDURE — 83550 IRON BINDING TEST: CPT

## 2024-09-14 PROCEDURE — 96376 TX/PRO/DX INJ SAME DRUG ADON: CPT

## 2024-09-14 PROCEDURE — 99233 SBSQ HOSP IP/OBS HIGH 50: CPT | Performed by: INTERNAL MEDICINE

## 2024-09-14 PROCEDURE — 83540 ASSAY OF IRON: CPT

## 2024-09-14 PROCEDURE — 6370000000 HC RX 637 (ALT 250 FOR IP)

## 2024-09-14 RX ORDER — ISOSORBIDE MONONITRATE 30 MG/1
30 TABLET, EXTENDED RELEASE ORAL DAILY
Status: DISCONTINUED | OUTPATIENT
Start: 2024-09-14 | End: 2024-09-17 | Stop reason: HOSPADM

## 2024-09-14 RX ORDER — CARVEDILOL 6.25 MG/1
6.25 TABLET ORAL 2 TIMES DAILY WITH MEALS
Status: DISCONTINUED | OUTPATIENT
Start: 2024-09-14 | End: 2024-09-17 | Stop reason: HOSPADM

## 2024-09-14 RX ORDER — ONDANSETRON 2 MG/ML
4 INJECTION INTRAMUSCULAR; INTRAVENOUS EVERY 6 HOURS PRN
Status: DISCONTINUED | OUTPATIENT
Start: 2024-09-14 | End: 2024-09-17 | Stop reason: HOSPADM

## 2024-09-14 RX ORDER — 0.9 % SODIUM CHLORIDE 0.9 %
500 INTRAVENOUS SOLUTION INTRAVENOUS ONCE
Status: COMPLETED | OUTPATIENT
Start: 2024-09-14 | End: 2024-09-14

## 2024-09-14 RX ORDER — PANTOPRAZOLE SODIUM 40 MG/1
40 TABLET, DELAYED RELEASE ORAL
Status: DISCONTINUED | OUTPATIENT
Start: 2024-09-14 | End: 2024-09-17 | Stop reason: HOSPADM

## 2024-09-14 RX ORDER — HYDRALAZINE HYDROCHLORIDE 25 MG/1
25 TABLET, FILM COATED ORAL EVERY 8 HOURS SCHEDULED
Status: DISCONTINUED | OUTPATIENT
Start: 2024-09-14 | End: 2024-09-17 | Stop reason: HOSPADM

## 2024-09-14 RX ADMIN — ONDANSETRON 4 MG: 2 INJECTION INTRAMUSCULAR; INTRAVENOUS at 16:49

## 2024-09-14 RX ADMIN — TRAMADOL HYDROCHLORIDE 50 MG: 50 TABLET ORAL at 10:44

## 2024-09-14 RX ADMIN — ACETAMINOPHEN 650 MG: 325 TABLET ORAL at 10:45

## 2024-09-14 RX ADMIN — ACETAMINOPHEN 650 MG: 325 TABLET ORAL at 23:06

## 2024-09-14 RX ADMIN — HYDRALAZINE HYDROCHLORIDE 25 MG: 25 TABLET ORAL at 13:09

## 2024-09-14 RX ADMIN — TRAMADOL HYDROCHLORIDE 50 MG: 50 TABLET ORAL at 22:42

## 2024-09-14 RX ADMIN — SODIUM CHLORIDE 500 ML: 9 INJECTION, SOLUTION INTRAVENOUS at 13:08

## 2024-09-14 RX ADMIN — PANTOPRAZOLE SODIUM 40 MG: 40 INJECTION, POWDER, FOR SOLUTION INTRAVENOUS at 09:14

## 2024-09-14 RX ADMIN — CARVEDILOL 6.25 MG: 6.25 TABLET, FILM COATED ORAL at 16:49

## 2024-09-14 RX ADMIN — HYDRALAZINE HYDROCHLORIDE 25 MG: 25 TABLET ORAL at 20:56

## 2024-09-14 RX ADMIN — SODIUM CHLORIDE, PRESERVATIVE FREE 10 ML: 5 INJECTION INTRAVENOUS at 09:14

## 2024-09-14 RX ADMIN — SODIUM CHLORIDE, PRESERVATIVE FREE 10 ML: 5 INJECTION INTRAVENOUS at 20:56

## 2024-09-14 RX ADMIN — PANTOPRAZOLE SODIUM 40 MG: 40 TABLET, DELAYED RELEASE ORAL at 16:49

## 2024-09-14 RX ADMIN — ISOSORBIDE MONONITRATE 30 MG: 30 TABLET, EXTENDED RELEASE ORAL at 13:09

## 2024-09-14 ASSESSMENT — PAIN DESCRIPTION - ORIENTATION
ORIENTATION: OTHER (COMMENT)
ORIENTATION: LOWER
ORIENTATION: OTHER (COMMENT)

## 2024-09-14 ASSESSMENT — PAIN DESCRIPTION - DESCRIPTORS
DESCRIPTORS: ACHING;DISCOMFORT

## 2024-09-14 ASSESSMENT — PAIN SCALES - GENERAL
PAINLEVEL_OUTOF10: 7
PAINLEVEL_OUTOF10: 5
PAINLEVEL_OUTOF10: 3
PAINLEVEL_OUTOF10: 8
PAINLEVEL_OUTOF10: 5

## 2024-09-14 ASSESSMENT — PAIN DESCRIPTION - LOCATION
LOCATION: GENERALIZED

## 2024-09-14 ASSESSMENT — PAIN SCALES - WONG BAKER
WONGBAKER_NUMERICALRESPONSE: NO HURT
WONGBAKER_NUMERICALRESPONSE: NO HURT

## 2024-09-14 ASSESSMENT — PAIN - FUNCTIONAL ASSESSMENT
PAIN_FUNCTIONAL_ASSESSMENT: PREVENTS OR INTERFERES SOME ACTIVE ACTIVITIES AND ADLS

## 2024-09-15 LAB
ANION GAP SERPL CALCULATED.3IONS-SCNC: 12 MMOL/L (ref 3–16)
BASOPHILS # BLD: 0 K/UL (ref 0–0.2)
BASOPHILS NFR BLD: 0.5 %
BUN SERPL-MCNC: 81 MG/DL (ref 7–20)
CALCIUM SERPL-MCNC: 9.9 MG/DL (ref 8.3–10.6)
CHLORIDE SERPL-SCNC: 99 MMOL/L (ref 99–110)
CO2 SERPL-SCNC: 22 MMOL/L (ref 21–32)
CREAT SERPL-MCNC: 2.6 MG/DL (ref 0.6–1.2)
DEPRECATED RDW RBC AUTO: 13 % (ref 12.4–15.4)
EOSINOPHIL # BLD: 0.6 K/UL (ref 0–0.6)
EOSINOPHIL NFR BLD: 5.9 %
GFR SERPLBLD CREATININE-BSD FMLA CKD-EPI: 17 ML/MIN/{1.73_M2}
GLUCOSE SERPL-MCNC: 92 MG/DL (ref 70–99)
HCT VFR BLD AUTO: 31 % (ref 36–48)
HGB BLD-MCNC: 10.4 G/DL (ref 12–16)
LYMPHOCYTES # BLD: 2.4 K/UL (ref 1–5.1)
LYMPHOCYTES NFR BLD: 25.7 %
MCH RBC QN AUTO: 31.5 PG (ref 26–34)
MCHC RBC AUTO-ENTMCNC: 33.6 G/DL (ref 31–36)
MCV RBC AUTO: 93.6 FL (ref 80–100)
MONOCYTES # BLD: 0.9 K/UL (ref 0–1.3)
MONOCYTES NFR BLD: 9.2 %
NEUTROPHILS # BLD: 5.4 K/UL (ref 1.7–7.7)
NEUTROPHILS NFR BLD: 58.7 %
PLATELET # BLD AUTO: 235 K/UL (ref 135–450)
PMV BLD AUTO: 9.9 FL (ref 5–10.5)
POTASSIUM SERPL-SCNC: 5.3 MMOL/L (ref 3.5–5.1)
RBC # BLD AUTO: 3.31 M/UL (ref 4–5.2)
SODIUM SERPL-SCNC: 133 MMOL/L (ref 136–145)
WBC # BLD AUTO: 9.3 K/UL (ref 4–11)

## 2024-09-15 PROCEDURE — 1200000000 HC SEMI PRIVATE

## 2024-09-15 PROCEDURE — 36415 COLL VENOUS BLD VENIPUNCTURE: CPT

## 2024-09-15 PROCEDURE — 6370000000 HC RX 637 (ALT 250 FOR IP): Performed by: INTERNAL MEDICINE

## 2024-09-15 PROCEDURE — 99233 SBSQ HOSP IP/OBS HIGH 50: CPT | Performed by: INTERNAL MEDICINE

## 2024-09-15 PROCEDURE — 2580000003 HC RX 258

## 2024-09-15 PROCEDURE — 6370000000 HC RX 637 (ALT 250 FOR IP): Performed by: NURSE PRACTITIONER

## 2024-09-15 PROCEDURE — 80048 BASIC METABOLIC PNL TOTAL CA: CPT

## 2024-09-15 PROCEDURE — 85025 COMPLETE CBC W/AUTO DIFF WBC: CPT

## 2024-09-15 PROCEDURE — 6370000000 HC RX 637 (ALT 250 FOR IP)

## 2024-09-15 RX ORDER — BISACODYL 5 MG/1
10 TABLET, DELAYED RELEASE ORAL ONCE
Status: COMPLETED | OUTPATIENT
Start: 2024-09-15 | End: 2024-09-15

## 2024-09-15 RX ADMIN — ACETAMINOPHEN 650 MG: 325 TABLET ORAL at 11:10

## 2024-09-15 RX ADMIN — ISOSORBIDE MONONITRATE 30 MG: 30 TABLET, EXTENDED RELEASE ORAL at 08:01

## 2024-09-15 RX ADMIN — PANTOPRAZOLE SODIUM 40 MG: 40 TABLET, DELAYED RELEASE ORAL at 05:26

## 2024-09-15 RX ADMIN — HYDRALAZINE HYDROCHLORIDE 25 MG: 25 TABLET ORAL at 22:17

## 2024-09-15 RX ADMIN — CARVEDILOL 6.25 MG: 6.25 TABLET, FILM COATED ORAL at 16:29

## 2024-09-15 RX ADMIN — SODIUM CHLORIDE, PRESERVATIVE FREE 10 ML: 5 INJECTION INTRAVENOUS at 08:01

## 2024-09-15 RX ADMIN — HYDRALAZINE HYDROCHLORIDE 25 MG: 25 TABLET ORAL at 05:26

## 2024-09-15 RX ADMIN — ACETAMINOPHEN 650 MG: 325 TABLET ORAL at 22:16

## 2024-09-15 RX ADMIN — ACETAMINOPHEN 650 MG: 325 TABLET ORAL at 05:25

## 2024-09-15 RX ADMIN — Medication 10 MG: at 18:37

## 2024-09-15 RX ADMIN — POLYETHYLENE GLYCOL-3350 AND ELECTROLYTES 4000 ML: 236; 6.74; 5.86; 2.97; 22.74 POWDER, FOR SOLUTION ORAL at 16:29

## 2024-09-15 RX ADMIN — HYDRALAZINE HYDROCHLORIDE 25 MG: 25 TABLET ORAL at 14:14

## 2024-09-15 RX ADMIN — CARVEDILOL 6.25 MG: 6.25 TABLET, FILM COATED ORAL at 08:01

## 2024-09-15 RX ADMIN — PANTOPRAZOLE SODIUM 40 MG: 40 TABLET, DELAYED RELEASE ORAL at 16:29

## 2024-09-15 RX ADMIN — SODIUM CHLORIDE, PRESERVATIVE FREE 10 ML: 5 INJECTION INTRAVENOUS at 22:17

## 2024-09-15 RX ADMIN — TRAMADOL HYDROCHLORIDE 50 MG: 50 TABLET ORAL at 11:10

## 2024-09-15 ASSESSMENT — PAIN DESCRIPTION - PAIN TYPE
TYPE: CHRONIC PAIN

## 2024-09-15 ASSESSMENT — PAIN DESCRIPTION - LOCATION
LOCATION: GENERALIZED
LOCATION: GENERALIZED
LOCATION: LEG

## 2024-09-15 ASSESSMENT — PAIN SCALES - WONG BAKER
WONGBAKER_NUMERICALRESPONSE: NO HURT

## 2024-09-15 ASSESSMENT — PAIN SCALES - GENERAL
PAINLEVEL_OUTOF10: 8
PAINLEVEL_OUTOF10: 8
PAINLEVEL_OUTOF10: 2
PAINLEVEL_OUTOF10: 3
PAINLEVEL_OUTOF10: 1

## 2024-09-15 ASSESSMENT — PAIN DESCRIPTION - ORIENTATION
ORIENTATION: RIGHT;LEFT;MID
ORIENTATION: OTHER (COMMENT)
ORIENTATION: OTHER (COMMENT)
ORIENTATION: RIGHT;LEFT

## 2024-09-15 ASSESSMENT — PAIN - FUNCTIONAL ASSESSMENT
PAIN_FUNCTIONAL_ASSESSMENT: ACTIVITIES ARE NOT PREVENTED
PAIN_FUNCTIONAL_ASSESSMENT: PREVENTS OR INTERFERES SOME ACTIVE ACTIVITIES AND ADLS

## 2024-09-15 ASSESSMENT — PAIN DESCRIPTION - DIRECTION: RADIATING_TOWARDS: NO

## 2024-09-15 ASSESSMENT — PAIN DESCRIPTION - DESCRIPTORS
DESCRIPTORS: ACHING;DISCOMFORT
DESCRIPTORS: ACHING
DESCRIPTORS: ACHING;CRUSHING
DESCRIPTORS: ACHING;DISCOMFORT

## 2024-09-15 ASSESSMENT — PAIN DESCRIPTION - FREQUENCY
FREQUENCY: INTERMITTENT
FREQUENCY: CONTINUOUS
FREQUENCY: CONTINUOUS

## 2024-09-15 ASSESSMENT — PAIN DESCRIPTION - ONSET
ONSET: ON-GOING

## 2024-09-16 ENCOUNTER — ANESTHESIA (OUTPATIENT)
Dept: ENDOSCOPY | Age: 84
DRG: 378 | End: 2024-09-16
Payer: MEDICARE

## 2024-09-16 ENCOUNTER — ANESTHESIA EVENT (OUTPATIENT)
Dept: ENDOSCOPY | Age: 84
DRG: 378 | End: 2024-09-16
Payer: MEDICARE

## 2024-09-16 LAB
ANION GAP SERPL CALCULATED.3IONS-SCNC: 14 MMOL/L (ref 3–16)
BASOPHILS # BLD: 0 K/UL (ref 0–0.2)
BASOPHILS NFR BLD: 0.3 %
BUN SERPL-MCNC: 62 MG/DL (ref 7–20)
CALCIUM SERPL-MCNC: 9.7 MG/DL (ref 8.3–10.6)
CHLORIDE SERPL-SCNC: 97 MMOL/L (ref 99–110)
CO2 SERPL-SCNC: 21 MMOL/L (ref 21–32)
CREAT SERPL-MCNC: 2.2 MG/DL (ref 0.6–1.2)
DEPRECATED RDW RBC AUTO: 12.9 % (ref 12.4–15.4)
EOSINOPHIL # BLD: 0.4 K/UL (ref 0–0.6)
EOSINOPHIL NFR BLD: 3.6 %
GFR SERPLBLD CREATININE-BSD FMLA CKD-EPI: 21 ML/MIN/{1.73_M2}
GLUCOSE SERPL-MCNC: 88 MG/DL (ref 70–99)
HCT VFR BLD AUTO: 34.1 % (ref 36–48)
HGB BLD-MCNC: 11.1 G/DL (ref 12–16)
LYMPHOCYTES # BLD: 1.9 K/UL (ref 1–5.1)
LYMPHOCYTES NFR BLD: 19.1 %
MCH RBC QN AUTO: 31.6 PG (ref 26–34)
MCHC RBC AUTO-ENTMCNC: 32.7 G/DL (ref 31–36)
MCV RBC AUTO: 96.7 FL (ref 80–100)
MONOCYTES # BLD: 0.8 K/UL (ref 0–1.3)
MONOCYTES NFR BLD: 8 %
NEUTROPHILS # BLD: 6.8 K/UL (ref 1.7–7.7)
NEUTROPHILS NFR BLD: 69 %
PLATELET # BLD AUTO: 226 K/UL (ref 135–450)
PMV BLD AUTO: 9.4 FL (ref 5–10.5)
POTASSIUM SERPL-SCNC: 4.6 MMOL/L (ref 3.5–5.1)
RBC # BLD AUTO: 3.53 M/UL (ref 4–5.2)
SODIUM SERPL-SCNC: 132 MMOL/L (ref 136–145)
WBC # BLD AUTO: 9.9 K/UL (ref 4–11)

## 2024-09-16 PROCEDURE — 3609010600 HC COLONOSCOPY POLYPECTOMY SNARE/COLD BIOPSY: Performed by: INTERNAL MEDICINE

## 2024-09-16 PROCEDURE — 88305 TISSUE EXAM BY PATHOLOGIST: CPT

## 2024-09-16 PROCEDURE — 3700000000 HC ANESTHESIA ATTENDED CARE: Performed by: INTERNAL MEDICINE

## 2024-09-16 PROCEDURE — 6370000000 HC RX 637 (ALT 250 FOR IP): Performed by: NURSE PRACTITIONER

## 2024-09-16 PROCEDURE — 7100000010 HC PHASE II RECOVERY - FIRST 15 MIN: Performed by: INTERNAL MEDICINE

## 2024-09-16 PROCEDURE — 2500000003 HC RX 250 WO HCPCS: Performed by: NURSE PRACTITIONER

## 2024-09-16 PROCEDURE — 2580000003 HC RX 258: Performed by: NURSE ANESTHETIST, CERTIFIED REGISTERED

## 2024-09-16 PROCEDURE — 2580000003 HC RX 258

## 2024-09-16 PROCEDURE — 1200000000 HC SEMI PRIVATE

## 2024-09-16 PROCEDURE — 2709999900 HC NON-CHARGEABLE SUPPLY: Performed by: INTERNAL MEDICINE

## 2024-09-16 PROCEDURE — 36415 COLL VENOUS BLD VENIPUNCTURE: CPT

## 2024-09-16 PROCEDURE — 85025 COMPLETE CBC W/AUTO DIFF WBC: CPT

## 2024-09-16 PROCEDURE — 3700000001 HC ADD 15 MINUTES (ANESTHESIA): Performed by: INTERNAL MEDICINE

## 2024-09-16 PROCEDURE — 0DBN8ZX EXCISION OF SIGMOID COLON, VIA NATURAL OR ARTIFICIAL OPENING ENDOSCOPIC, DIAGNOSTIC: ICD-10-PCS | Performed by: INTERNAL MEDICINE

## 2024-09-16 PROCEDURE — 80048 BASIC METABOLIC PNL TOTAL CA: CPT

## 2024-09-16 PROCEDURE — 7100000011 HC PHASE II RECOVERY - ADDTL 15 MIN: Performed by: INTERNAL MEDICINE

## 2024-09-16 PROCEDURE — 6370000000 HC RX 637 (ALT 250 FOR IP)

## 2024-09-16 PROCEDURE — 99232 SBSQ HOSP IP/OBS MODERATE 35: CPT | Performed by: HOSPITALIST

## 2024-09-16 PROCEDURE — 6360000002 HC RX W HCPCS: Performed by: NURSE ANESTHETIST, CERTIFIED REGISTERED

## 2024-09-16 RX ORDER — PROPOFOL 10 MG/ML
INJECTION, EMULSION INTRAVENOUS
Status: DISCONTINUED | OUTPATIENT
Start: 2024-09-16 | End: 2024-09-16 | Stop reason: SDUPTHER

## 2024-09-16 RX ORDER — CINACALCET 30 MG/1
30 TABLET, FILM COATED ORAL
Status: DISCONTINUED | OUTPATIENT
Start: 2024-09-16 | End: 2024-09-17 | Stop reason: HOSPADM

## 2024-09-16 RX ORDER — TORSEMIDE 20 MG/1
20 TABLET ORAL DAILY PRN
Status: DISCONTINUED | OUTPATIENT
Start: 2024-09-16 | End: 2024-09-17 | Stop reason: HOSPADM

## 2024-09-16 RX ORDER — SODIUM CHLORIDE, SODIUM LACTATE, POTASSIUM CHLORIDE, CALCIUM CHLORIDE 600; 310; 30; 20 MG/100ML; MG/100ML; MG/100ML; MG/100ML
INJECTION, SOLUTION INTRAVENOUS
Status: DISCONTINUED | OUTPATIENT
Start: 2024-09-16 | End: 2024-09-16 | Stop reason: SDUPTHER

## 2024-09-16 RX ORDER — ACETAMINOPHEN 500 MG
500 TABLET ORAL 2 TIMES DAILY
Status: DISCONTINUED | OUTPATIENT
Start: 2024-09-16 | End: 2024-09-17 | Stop reason: HOSPADM

## 2024-09-16 RX ORDER — ACETAMINOPHEN 500 MG
1000 TABLET ORAL DAILY
Status: DISCONTINUED | OUTPATIENT
Start: 2024-09-17 | End: 2024-09-17 | Stop reason: HOSPADM

## 2024-09-16 RX ORDER — TRAMADOL HYDROCHLORIDE 50 MG/1
50 TABLET ORAL 2 TIMES DAILY
Status: DISCONTINUED | OUTPATIENT
Start: 2024-09-16 | End: 2024-09-17 | Stop reason: HOSPADM

## 2024-09-16 RX ORDER — LIDOCAINE HYDROCHLORIDE 20 MG/ML
INJECTION, SOLUTION INTRAVENOUS
Status: DISCONTINUED | OUTPATIENT
Start: 2024-09-16 | End: 2024-09-16 | Stop reason: SDUPTHER

## 2024-09-16 RX ADMIN — TRAMADOL HYDROCHLORIDE 50 MG: 50 TABLET ORAL at 00:14

## 2024-09-16 RX ADMIN — PROPOFOL 100 MCG/KG/MIN: 10 INJECTION, EMULSION INTRAVENOUS at 14:40

## 2024-09-16 RX ADMIN — HYDRALAZINE HYDROCHLORIDE 25 MG: 25 TABLET ORAL at 21:06

## 2024-09-16 RX ADMIN — PHENYLEPHRINE HYDROCHLORIDE 200 MCG: 10 INJECTION, SOLUTION INTRAMUSCULAR; INTRAVENOUS; SUBCUTANEOUS at 14:41

## 2024-09-16 RX ADMIN — TRAMADOL HYDROCHLORIDE 50 MG: 50 TABLET ORAL at 21:07

## 2024-09-16 RX ADMIN — PANTOPRAZOLE SODIUM 40 MG: 40 TABLET, DELAYED RELEASE ORAL at 06:37

## 2024-09-16 RX ADMIN — PROPOFOL 50 MG: 10 INJECTION, EMULSION INTRAVENOUS at 14:25

## 2024-09-16 RX ADMIN — APIXABAN 2.5 MG: 2.5 TABLET, FILM COATED ORAL at 21:07

## 2024-09-16 RX ADMIN — HYDRALAZINE HYDROCHLORIDE 25 MG: 25 TABLET ORAL at 06:37

## 2024-09-16 RX ADMIN — LIDOCAINE HYDROCHLORIDE 100 MG: 20 INJECTION, SOLUTION INTRAVENOUS at 14:25

## 2024-09-16 RX ADMIN — ACETAMINOPHEN 500 MG: 500 TABLET ORAL at 21:07

## 2024-09-16 RX ADMIN — ACETAMINOPHEN 650 MG: 325 TABLET ORAL at 10:17

## 2024-09-16 RX ADMIN — SODIUM CHLORIDE, SODIUM LACTATE, POTASSIUM CHLORIDE, AND CALCIUM CHLORIDE: .6; .31; .03; .02 INJECTION, SOLUTION INTRAVENOUS at 14:18

## 2024-09-16 RX ADMIN — PANTOPRAZOLE SODIUM 40 MG: 40 TABLET, DELAYED RELEASE ORAL at 17:00

## 2024-09-16 RX ADMIN — TRAMADOL HYDROCHLORIDE 50 MG: 50 TABLET ORAL at 15:45

## 2024-09-16 RX ADMIN — SODIUM CHLORIDE, PRESERVATIVE FREE 10 ML: 5 INJECTION INTRAVENOUS at 10:31

## 2024-09-16 RX ADMIN — MICONAZOLE NITRATE: 20 POWDER TOPICAL at 21:07

## 2024-09-16 RX ADMIN — CINACALCET 30 MG: 30 TABLET, FILM COATED ORAL at 17:00

## 2024-09-16 RX ADMIN — HYDRALAZINE HYDROCHLORIDE 25 MG: 25 TABLET ORAL at 15:45

## 2024-09-16 RX ADMIN — CARVEDILOL 6.25 MG: 6.25 TABLET, FILM COATED ORAL at 17:00

## 2024-09-16 ASSESSMENT — PAIN SCALES - GENERAL
PAINLEVEL_OUTOF10: 5
PAINLEVEL_OUTOF10: 8
PAINLEVEL_OUTOF10: 8
PAINLEVEL_OUTOF10: 0

## 2024-09-16 ASSESSMENT — PAIN SCALES - WONG BAKER
WONGBAKER_NUMERICALRESPONSE: NO HURT

## 2024-09-16 ASSESSMENT — PAIN DESCRIPTION - ONSET: ONSET: ON-GOING

## 2024-09-16 ASSESSMENT — PAIN DESCRIPTION - PAIN TYPE: TYPE: CHRONIC PAIN

## 2024-09-16 ASSESSMENT — PAIN DESCRIPTION - LOCATION
LOCATION: GENERALIZED

## 2024-09-16 ASSESSMENT — PAIN - FUNCTIONAL ASSESSMENT
PAIN_FUNCTIONAL_ASSESSMENT: ACTIVITIES ARE NOT PREVENTED
PAIN_FUNCTIONAL_ASSESSMENT: NONE - DENIES PAIN

## 2024-09-16 ASSESSMENT — PAIN DESCRIPTION - ORIENTATION
ORIENTATION: RIGHT;LEFT;MID
ORIENTATION: RIGHT;LEFT;MID

## 2024-09-16 ASSESSMENT — PAIN DESCRIPTION - FREQUENCY: FREQUENCY: CONTINUOUS

## 2024-09-16 ASSESSMENT — PAIN DESCRIPTION - DESCRIPTORS: DESCRIPTORS: ACHING

## 2024-09-17 VITALS
BODY MASS INDEX: 27.99 KG/M2 | HEIGHT: 65 IN | RESPIRATION RATE: 16 BRPM | HEART RATE: 63 BPM | WEIGHT: 167.99 LBS | TEMPERATURE: 98.1 F | SYSTOLIC BLOOD PRESSURE: 126 MMHG | DIASTOLIC BLOOD PRESSURE: 61 MMHG | OXYGEN SATURATION: 94 %

## 2024-09-17 LAB
ANION GAP SERPL CALCULATED.3IONS-SCNC: 14 MMOL/L (ref 3–16)
BUN SERPL-MCNC: 51 MG/DL (ref 7–20)
CALCIUM SERPL-MCNC: 9.5 MG/DL (ref 8.3–10.6)
CHLORIDE SERPL-SCNC: 99 MMOL/L (ref 99–110)
CO2 SERPL-SCNC: 21 MMOL/L (ref 21–32)
CREAT SERPL-MCNC: 2.1 MG/DL (ref 0.6–1.2)
DEPRECATED RDW RBC AUTO: 13.1 % (ref 12.4–15.4)
GFR SERPLBLD CREATININE-BSD FMLA CKD-EPI: 23 ML/MIN/{1.73_M2}
GLUCOSE SERPL-MCNC: 164 MG/DL (ref 70–99)
HCT VFR BLD AUTO: 35 % (ref 36–48)
HGB BLD-MCNC: 11.3 G/DL (ref 12–16)
MCH RBC QN AUTO: 30.8 PG (ref 26–34)
MCHC RBC AUTO-ENTMCNC: 32.4 G/DL (ref 31–36)
MCV RBC AUTO: 95.2 FL (ref 80–100)
PLATELET # BLD AUTO: 239 K/UL (ref 135–450)
PMV BLD AUTO: 9.5 FL (ref 5–10.5)
POTASSIUM SERPL-SCNC: 4.6 MMOL/L (ref 3.5–5.1)
RBC # BLD AUTO: 3.67 M/UL (ref 4–5.2)
SODIUM SERPL-SCNC: 134 MMOL/L (ref 136–145)
WBC # BLD AUTO: 8.5 K/UL (ref 4–11)

## 2024-09-17 PROCEDURE — 80048 BASIC METABOLIC PNL TOTAL CA: CPT

## 2024-09-17 PROCEDURE — 36415 COLL VENOUS BLD VENIPUNCTURE: CPT

## 2024-09-17 PROCEDURE — 6370000000 HC RX 637 (ALT 250 FOR IP): Performed by: NURSE PRACTITIONER

## 2024-09-17 PROCEDURE — 99233 SBSQ HOSP IP/OBS HIGH 50: CPT | Performed by: INTERNAL MEDICINE

## 2024-09-17 PROCEDURE — 97535 SELF CARE MNGMENT TRAINING: CPT

## 2024-09-17 PROCEDURE — 97162 PT EVAL MOD COMPLEX 30 MIN: CPT

## 2024-09-17 PROCEDURE — 85027 COMPLETE CBC AUTOMATED: CPT

## 2024-09-17 PROCEDURE — 97530 THERAPEUTIC ACTIVITIES: CPT

## 2024-09-17 PROCEDURE — 6370000000 HC RX 637 (ALT 250 FOR IP)

## 2024-09-17 PROCEDURE — 97166 OT EVAL MOD COMPLEX 45 MIN: CPT

## 2024-09-17 RX ORDER — PANTOPRAZOLE SODIUM 40 MG/1
40 TABLET, DELAYED RELEASE ORAL
DISCHARGE
Start: 2024-09-17

## 2024-09-17 RX ORDER — TRAMADOL HYDROCHLORIDE 50 MG/1
50 TABLET ORAL 2 TIMES DAILY
Qty: 6 TABLET | Refills: 0 | Status: SHIPPED | OUTPATIENT
Start: 2024-09-17 | End: 2024-09-20

## 2024-09-17 RX ORDER — DEXTROMETHORPHAN HYDROBROMIDE AND PROMETHAZINE HYDROCHLORIDE 15; 6.25 MG/5ML; MG/5ML
5 SYRUP ORAL NIGHTLY PRN
Qty: 118 ML | Refills: 0 | Status: SHIPPED | OUTPATIENT
Start: 2024-09-17

## 2024-09-17 RX ADMIN — APIXABAN 2.5 MG: 2.5 TABLET, FILM COATED ORAL at 10:03

## 2024-09-17 RX ADMIN — PANTOPRAZOLE SODIUM 40 MG: 40 TABLET, DELAYED RELEASE ORAL at 05:15

## 2024-09-17 RX ADMIN — HYDRALAZINE HYDROCHLORIDE 25 MG: 25 TABLET ORAL at 05:15

## 2024-09-17 RX ADMIN — MICONAZOLE NITRATE: 20 POWDER TOPICAL at 10:06

## 2024-09-17 RX ADMIN — ACETAMINOPHEN 500 MG: 500 TABLET ORAL at 10:03

## 2024-09-17 RX ADMIN — CARVEDILOL 6.25 MG: 6.25 TABLET, FILM COATED ORAL at 10:03

## 2024-09-17 RX ADMIN — TRAMADOL HYDROCHLORIDE 50 MG: 50 TABLET ORAL at 01:57

## 2024-09-17 RX ADMIN — ISOSORBIDE MONONITRATE 30 MG: 30 TABLET, EXTENDED RELEASE ORAL at 10:03

## 2024-09-17 RX ADMIN — ACETAMINOPHEN 650 MG: 325 TABLET ORAL at 05:15

## 2024-09-17 ASSESSMENT — PAIN DESCRIPTION - FREQUENCY: FREQUENCY: CONTINUOUS

## 2024-09-17 ASSESSMENT — PAIN DESCRIPTION - LOCATION
LOCATION: GENERALIZED
LOCATION: GENERALIZED

## 2024-09-17 ASSESSMENT — PAIN DESCRIPTION - ONSET: ONSET: ON-GOING

## 2024-09-17 ASSESSMENT — PAIN DESCRIPTION - PAIN TYPE: TYPE: CHRONIC PAIN

## 2024-09-17 ASSESSMENT — PAIN - FUNCTIONAL ASSESSMENT: PAIN_FUNCTIONAL_ASSESSMENT: PREVENTS OR INTERFERES SOME ACTIVE ACTIVITIES AND ADLS

## 2024-09-17 ASSESSMENT — PAIN DESCRIPTION - DESCRIPTORS
DESCRIPTORS: ACHING;DISCOMFORT
DESCRIPTORS: ACHING;DISCOMFORT

## 2024-09-17 ASSESSMENT — PAIN SCALES - GENERAL: PAINLEVEL_OUTOF10: 7

## 2024-10-23 ENCOUNTER — TELEPHONE (OUTPATIENT)
Dept: CARDIOLOGY CLINIC | Age: 84
End: 2024-10-23

## 2024-10-23 NOTE — TELEPHONE ENCOUNTER
Pt c/o palpitations at rehab facility. Denies SOB, CP, dizziness, etc. ECG shows APVP rhythm. She does not have her home monitor at the rehab facility as it is likely a temporary thing. However, Karolyn will ask the  to bring the home monitor (he visits daily) and will send us a transmission then.

## 2024-10-23 NOTE — TELEPHONE ENCOUNTER
[facility] called stating the patient was complaining of a irregular HB, so they did an EKG. Karolyn states the patient's next office is on 12/09/24 and the need to know based on the EKG does the patient need to be seen sooner. The EKG is being faxed and scanned in media. Please call Karolyn at [phone] to advise.

## 2024-10-28 NOTE — TELEPHONE ENCOUNTER
We still have not received a manual download/transmission as of 1238 on 10/28/2024.  Please inform Karolyn with [facility].  If they need assistance to call [employer] STAY CONNECTED [phone]    Thanks.

## 2024-10-28 NOTE — TELEPHONE ENCOUNTER
CHAPIS requesting a call back from Karolyn for an update on sending a manual download/transmission.

## 2024-11-04 ENCOUNTER — HOSPITAL ENCOUNTER (EMERGENCY)
Age: 84
Discharge: OTHER INSTITUTION | End: 2024-11-04
Attending: EMERGENCY MEDICINE
Payer: MEDICARE

## 2024-11-04 VITALS
HEIGHT: 65 IN | RESPIRATION RATE: 16 BRPM | SYSTOLIC BLOOD PRESSURE: 141 MMHG | DIASTOLIC BLOOD PRESSURE: 47 MMHG | OXYGEN SATURATION: 97 % | WEIGHT: 161 LBS | HEART RATE: 78 BPM | TEMPERATURE: 97.3 F | BODY MASS INDEX: 26.82 KG/M2

## 2024-11-04 DIAGNOSIS — R04.0 EPISTAXIS: Primary | ICD-10-CM

## 2024-11-04 PROCEDURE — 30901 CONTROL OF NOSEBLEED: CPT

## 2024-11-04 PROCEDURE — 6370000000 HC RX 637 (ALT 250 FOR IP): Performed by: EMERGENCY MEDICINE

## 2024-11-04 PROCEDURE — 99283 EMERGENCY DEPT VISIT LOW MDM: CPT

## 2024-11-04 RX ORDER — ECHINACEA PURPUREA EXTRACT 125 MG
1 TABLET ORAL PRN
Qty: 1 EACH | Refills: 3 | Status: SHIPPED | OUTPATIENT
Start: 2024-11-04

## 2024-11-04 RX ORDER — TRANEXAMIC ACID 100 MG/ML
1000 INJECTION, SOLUTION INTRAVENOUS ONCE
Status: DISCONTINUED | OUTPATIENT
Start: 2024-11-04 | End: 2024-11-04

## 2024-11-04 RX ORDER — OXYMETAZOLINE HYDROCHLORIDE 0.05 G/100ML
2 SPRAY NASAL ONCE
Status: COMPLETED | OUTPATIENT
Start: 2024-11-04 | End: 2024-11-04

## 2024-11-04 RX ADMIN — Medication 1 EACH: at 05:29

## 2024-11-04 RX ADMIN — OXYMETAZOLINE HYDROCHLORIDE 2 SPRAY: 0.5 SPRAY NASAL at 03:57

## 2024-11-04 NOTE — TELEPHONE ENCOUNTER
Spoke with Karolyn. The  has not brought in the pt's bedside monitor yet.    Left detailed VM for Riaz ( on HIPAA) asking that he take her bedside monitor to the NH and plug in beside her bed. Also, proveded MDT number for assistance in sending us a transmission.

## 2024-11-04 NOTE — ED PROVIDER NOTES
THE [facility]  EMERGENCY DEPARTMENT ENCOUNTER          ATTENDING PHYSICIAN NOTE       Date of evaluation: 11/4/2024    Chief Complaint     Epistaxis (Patient complaint of nose bleed since 0300- ems administered jennifer intranasal to stop bleed- )      History of Present Illness     Allegra Reina is a 84 y.o. female who presents with a chief complaint of epistaxis.  Patient states she has had a nosebleed since around 3 AM.  EMS did administer intranasal jennifer to stop bleeding, presents with continued bleeding.  Does take Eliquis for history of clot.  States she has had issues with minor bleeds in the past but has not had any large bleeds in the past that she has been unable to control at home.  Denies any other symptoms.  Denies lightheadedness or dizziness or headache.  Does feel some blood going down the back of her throat.    ASSESSMENT / PLAN  (MEDICAL DECISION MAKING)     INITIAL VITALS: BP: (!) 141/72, Temp: 97.3 °F (36.3 °C), Pulse: 78, Respirations: 16, SpO2: 100 %      Allegra Reina is a 84 y.o. female who presents with a chief complaint of epistaxis.  Initial exam reveals a female in no acute distress with normal vitals, afebrile.  Physical exam remarkable for large amounts of blood noted inside the left nare and in the posterior oropharynx.  Patient was provided with Afrin and pressure.  On reassessment the bleeding had improved and she did cough up 1 large clot but then did not have any further significant large clots.  Noted to have what appears to be a focal area of bleeding in the anterior left nare.  This was cauterized with silver nitrate.  Patient was then monitored for an additional amount of time and noted on reassessment to have no further bleeding.  Patient at this time can be discharged back home.  Can follow-up with ENT given she is on blood thinners and may have recurrent nosebleeds.  No indication for Rhino Rocket or other procedures at this time..    Is this patient to be

## 2024-11-07 NOTE — TELEPHONE ENCOUNTER
As of 11.07.2024 @ 1323, we still have not received a manual transmission.   Pt has upcoming EP/device appts on 12.09.2024.

## 2025-01-01 ENCOUNTER — HOSPITAL ENCOUNTER (INPATIENT)
Age: 85
LOS: 2 days | DRG: 284 | End: 2025-05-22
Attending: EMERGENCY MEDICINE | Admitting: INTERNAL MEDICINE
Payer: MEDICARE

## 2025-01-01 ENCOUNTER — APPOINTMENT (OUTPATIENT)
Dept: GENERAL RADIOLOGY | Age: 85
DRG: 284 | End: 2025-01-01
Payer: MEDICARE

## 2025-01-01 VITALS
BODY MASS INDEX: 28.06 KG/M2 | SYSTOLIC BLOOD PRESSURE: 102 MMHG | WEIGHT: 168.43 LBS | TEMPERATURE: 97.9 F | DIASTOLIC BLOOD PRESSURE: 47 MMHG | RESPIRATION RATE: 14 BRPM | OXYGEN SATURATION: 100 % | HEIGHT: 65 IN | HEART RATE: 74 BPM

## 2025-01-01 DIAGNOSIS — R53.83 FATIGUE, UNSPECIFIED TYPE: Primary | ICD-10-CM

## 2025-01-01 DIAGNOSIS — I21.4 NSTEMI (NON-ST ELEVATED MYOCARDIAL INFARCTION) (HCC): ICD-10-CM

## 2025-01-01 LAB
ALBUMIN SERPL-MCNC: 4 G/DL (ref 3.4–5)
ALBUMIN SERPL-MCNC: 4 G/DL (ref 3.4–5)
ALP SERPL-CCNC: 78 U/L (ref 40–129)
ALP SERPL-CCNC: 81 U/L (ref 40–129)
ALT SERPL-CCNC: 167 U/L (ref 10–40)
ALT SERPL-CCNC: ABNORMAL U/L (ref 10–40)
ANION GAP SERPL CALCULATED.3IONS-SCNC: 16 MMOL/L (ref 3–16)
ANION GAP SERPL CALCULATED.3IONS-SCNC: 17 MMOL/L (ref 3–16)
ANION GAP SERPL CALCULATED.3IONS-SCNC: 20 MMOL/L (ref 3–16)
ANISOCYTOSIS BLD QL SMEAR: ABNORMAL
AST SERPL-CCNC: 882 U/L (ref 15–37)
AST SERPL-CCNC: 991 U/L (ref 15–37)
BACTERIA UR CULT: NORMAL
BACTERIA URNS QL MICRO: ABNORMAL /HPF
BASOPHILS # BLD: 0 K/UL (ref 0–0.2)
BASOPHILS # BLD: 0 K/UL (ref 0–0.2)
BASOPHILS NFR BLD: 0 %
BASOPHILS NFR BLD: 0.3 %
BILIRUB DIRECT SERPL-MCNC: 0.2 MG/DL (ref 0–0.3)
BILIRUB DIRECT SERPL-MCNC: 0.5 MG/DL (ref 0–0.3)
BILIRUB INDIRECT SERPL-MCNC: 0.4 MG/DL (ref 0–1)
BILIRUB INDIRECT SERPL-MCNC: 0.7 MG/DL (ref 0–1)
BILIRUB SERPL-MCNC: 0.9 MG/DL (ref 0–1)
BILIRUB SERPL-MCNC: 0.9 MG/DL (ref 0–1)
BILIRUB UR QL STRIP.AUTO: NEGATIVE
BUN SERPL-MCNC: 62 MG/DL (ref 7–20)
BUN SERPL-MCNC: 65 MG/DL (ref 7–20)
BUN SERPL-MCNC: 69 MG/DL (ref 7–20)
CALCIUM SERPL-MCNC: 10.4 MG/DL (ref 8.3–10.6)
CALCIUM SERPL-MCNC: 10.6 MG/DL (ref 8.3–10.6)
CALCIUM SERPL-MCNC: 10.7 MG/DL (ref 8.3–10.6)
CHLORIDE SERPL-SCNC: 91 MMOL/L (ref 99–110)
CHLORIDE SERPL-SCNC: 92 MMOL/L (ref 99–110)
CHLORIDE SERPL-SCNC: 92 MMOL/L (ref 99–110)
CLARITY UR: CLEAR
CO2 SERPL-SCNC: 16 MMOL/L (ref 21–32)
CO2 SERPL-SCNC: 16 MMOL/L (ref 21–32)
CO2 SERPL-SCNC: 18 MMOL/L (ref 21–32)
COLOR UR: YELLOW
CREAT SERPL-MCNC: 3.6 MG/DL (ref 0.6–1.2)
CREAT SERPL-MCNC: 3.7 MG/DL (ref 0.6–1.2)
CREAT SERPL-MCNC: 4.1 MG/DL (ref 0.6–1.2)
DACRYOCYTES BLD QL SMEAR: ABNORMAL
DEPRECATED RDW RBC AUTO: 14.7 % (ref 12.4–15.4)
DEPRECATED RDW RBC AUTO: 14.8 % (ref 12.4–15.4)
EKG ATRIAL RATE: 88 BPM
EKG ATRIAL RATE: 89 BPM
EKG DIAGNOSIS: NORMAL
EKG DIAGNOSIS: NORMAL
EKG P AXIS: 1 DEGREES
EKG P AXIS: 15 DEGREES
EKG P-R INTERVAL: 270 MS
EKG P-R INTERVAL: 280 MS
EKG Q-T INTERVAL: 420 MS
EKG Q-T INTERVAL: 456 MS
EKG QRS DURATION: 182 MS
EKG QRS DURATION: 192 MS
EKG QTC CALCULATION (BAZETT): 511 MS
EKG QTC CALCULATION (BAZETT): 551 MS
EKG R AXIS: -54 DEGREES
EKG R AXIS: -62 DEGREES
EKG T AXIS: 101 DEGREES
EKG T AXIS: 104 DEGREES
EKG VENTRICULAR RATE: 88 BPM
EKG VENTRICULAR RATE: 89 BPM
EOSINOPHIL # BLD: 0 K/UL (ref 0–0.6)
EOSINOPHIL # BLD: 0 K/UL (ref 0–0.6)
EOSINOPHIL NFR BLD: 0 %
EOSINOPHIL NFR BLD: 0.1 %
EPI CELLS #/AREA URNS HPF: ABNORMAL /HPF (ref 0–5)
GFR SERPLBLD CREATININE-BSD FMLA CKD-EPI: 10 ML/MIN/{1.73_M2}
GFR SERPLBLD CREATININE-BSD FMLA CKD-EPI: 11 ML/MIN/{1.73_M2}
GFR SERPLBLD CREATININE-BSD FMLA CKD-EPI: 12 ML/MIN/{1.73_M2}
GLUCOSE SERPL-MCNC: 100 MG/DL (ref 70–99)
GLUCOSE SERPL-MCNC: 139 MG/DL (ref 70–99)
GLUCOSE SERPL-MCNC: 147 MG/DL (ref 70–99)
GLUCOSE UR STRIP.AUTO-MCNC: NEGATIVE MG/DL
HCT VFR BLD AUTO: 34.4 % (ref 36–48)
HCT VFR BLD AUTO: 37.6 % (ref 36–48)
HGB BLD-MCNC: 11.7 G/DL (ref 12–16)
HGB BLD-MCNC: 12.2 G/DL (ref 12–16)
HGB UR QL STRIP.AUTO: ABNORMAL
KETONES UR STRIP.AUTO-MCNC: NEGATIVE MG/DL
LEUKOCYTE ESTERASE UR QL STRIP.AUTO: ABNORMAL
LIPASE SERPL-CCNC: 23 U/L (ref 13–60)
LYMPHOCYTES # BLD: 1.2 K/UL (ref 1–5.1)
LYMPHOCYTES # BLD: 1.7 K/UL (ref 1–5.1)
LYMPHOCYTES NFR BLD: 12 %
LYMPHOCYTES NFR BLD: 14.1 %
MAGNESIUM SERPL-MCNC: 3.51 MG/DL (ref 1.8–2.4)
MCH RBC QN AUTO: 29.3 PG (ref 26–34)
MCH RBC QN AUTO: 30.2 PG (ref 26–34)
MCHC RBC AUTO-ENTMCNC: 32.4 G/DL (ref 31–36)
MCHC RBC AUTO-ENTMCNC: 33.9 G/DL (ref 31–36)
MCV RBC AUTO: 89 FL (ref 80–100)
MCV RBC AUTO: 90.7 FL (ref 80–100)
MONOCYTES # BLD: 0.5 K/UL (ref 0–1.3)
MONOCYTES # BLD: 0.8 K/UL (ref 0–1.3)
MONOCYTES NFR BLD: 5.6 %
MONOCYTES NFR BLD: 6 %
NEUTROPHILS # BLD: 11.4 K/UL (ref 1.7–7.7)
NEUTROPHILS # BLD: 6.9 K/UL (ref 1.7–7.7)
NEUTROPHILS NFR BLD: 79.9 %
NEUTROPHILS NFR BLD: 82 %
NITRITE UR QL STRIP.AUTO: NEGATIVE
OVALOCYTES BLD QL SMEAR: ABNORMAL
PH UR STRIP.AUTO: 6 [PH] (ref 5–8)
PLATELET # BLD AUTO: 149 K/UL (ref 135–450)
PLATELET # BLD AUTO: 195 K/UL (ref 135–450)
PMV BLD AUTO: 9.9 FL (ref 5–10.5)
PMV BLD AUTO: 9.9 FL (ref 5–10.5)
POTASSIUM SERPL-SCNC: 6 MMOL/L (ref 3.5–5.1)
POTASSIUM SERPL-SCNC: 6.3 MMOL/L (ref 3.5–5.1)
POTASSIUM SERPL-SCNC: ABNORMAL MMOL/L (ref 3.5–5.1)
PROT SERPL-MCNC: 7.6 G/DL (ref 6.4–8.2)
PROT SERPL-MCNC: 7.9 G/DL (ref 6.4–8.2)
PROT UR STRIP.AUTO-MCNC: 30 MG/DL
RBC # BLD AUTO: 3.86 M/UL (ref 4–5.2)
RBC # BLD AUTO: 4.15 M/UL (ref 4–5.2)
RBC #/AREA URNS HPF: ABNORMAL /HPF (ref 0–4)
RENAL EPI CELLS #/AREA UR COMP ASSIST: ABNORMAL /HPF (ref 0–1)
SODIUM SERPL-SCNC: 124 MMOL/L (ref 136–145)
SODIUM SERPL-SCNC: 127 MMOL/L (ref 136–145)
SODIUM SERPL-SCNC: 127 MMOL/L (ref 136–145)
SP GR UR STRIP.AUTO: 1.02 (ref 1–1.03)
TROPONIN, HIGH SENSITIVITY: 9846 NG/L (ref 0–14)
TROPONIN, HIGH SENSITIVITY: ABNORMAL NG/L (ref 0–14)
UA COMPLETE W REFLEX CULTURE PNL UR: YES
UA DIPSTICK W REFLEX MICRO PNL UR: YES
URN SPEC COLLECT METH UR: ABNORMAL
UROBILINOGEN UR STRIP-ACNC: 0.2 E.U./DL
WBC # BLD AUTO: 13.9 K/UL (ref 4–11)
WBC # BLD AUTO: 8.7 K/UL (ref 4–11)
WBC #/AREA URNS HPF: >100 /HPF (ref 0–5)

## 2025-01-01 PROCEDURE — 1200000000 HC SEMI PRIVATE

## 2025-01-01 PROCEDURE — 96374 THER/PROPH/DIAG INJ IV PUSH: CPT

## 2025-01-01 PROCEDURE — 93005 ELECTROCARDIOGRAM TRACING: CPT | Performed by: EMERGENCY MEDICINE

## 2025-01-01 PROCEDURE — 36415 COLL VENOUS BLD VENIPUNCTURE: CPT

## 2025-01-01 PROCEDURE — 71045 X-RAY EXAM CHEST 1 VIEW: CPT

## 2025-01-01 PROCEDURE — 83690 ASSAY OF LIPASE: CPT

## 2025-01-01 PROCEDURE — 51702 INSERT TEMP BLADDER CATH: CPT

## 2025-01-01 PROCEDURE — 87086 URINE CULTURE/COLONY COUNT: CPT

## 2025-01-01 PROCEDURE — 84484 ASSAY OF TROPONIN QUANT: CPT

## 2025-01-01 PROCEDURE — 99223 1ST HOSP IP/OBS HIGH 75: CPT | Performed by: HOSPITALIST

## 2025-01-01 PROCEDURE — 80048 BASIC METABOLIC PNL TOTAL CA: CPT

## 2025-01-01 PROCEDURE — 6370000000 HC RX 637 (ALT 250 FOR IP): Performed by: INTERNAL MEDICINE

## 2025-01-01 PROCEDURE — 6360000002 HC RX W HCPCS: Performed by: INTERNAL MEDICINE

## 2025-01-01 PROCEDURE — 6370000000 HC RX 637 (ALT 250 FOR IP): Performed by: EMERGENCY MEDICINE

## 2025-01-01 PROCEDURE — 6360000002 HC RX W HCPCS

## 2025-01-01 PROCEDURE — 85025 COMPLETE CBC W/AUTO DIFF WBC: CPT

## 2025-01-01 PROCEDURE — 80076 HEPATIC FUNCTION PANEL: CPT

## 2025-01-01 PROCEDURE — 6360000002 HC RX W HCPCS: Performed by: PHYSICIAN ASSISTANT

## 2025-01-01 PROCEDURE — 83735 ASSAY OF MAGNESIUM: CPT

## 2025-01-01 PROCEDURE — 99285 EMERGENCY DEPT VISIT HI MDM: CPT

## 2025-01-01 PROCEDURE — 99232 SBSQ HOSP IP/OBS MODERATE 35: CPT | Performed by: INTERNAL MEDICINE

## 2025-01-01 PROCEDURE — 93005 ELECTROCARDIOGRAM TRACING: CPT

## 2025-01-01 PROCEDURE — 2500000003 HC RX 250 WO HCPCS: Performed by: INTERNAL MEDICINE

## 2025-01-01 PROCEDURE — 81001 URINALYSIS AUTO W/SCOPE: CPT

## 2025-01-01 RX ORDER — CINACALCET 30 MG/1
30 TABLET, FILM COATED ORAL DAILY
Status: DISCONTINUED | OUTPATIENT
Start: 2025-01-01 | End: 2025-05-22 | Stop reason: HOSPADM

## 2025-01-01 RX ORDER — SODIUM CHLORIDE 9 MG/ML
INJECTION, SOLUTION INTRAVENOUS PRN
Status: DISCONTINUED | OUTPATIENT
Start: 2025-01-01 | End: 2025-05-22 | Stop reason: HOSPADM

## 2025-01-01 RX ORDER — ACETAMINOPHEN 650 MG/1
650 SUPPOSITORY RECTAL EVERY 6 HOURS PRN
Status: DISCONTINUED | OUTPATIENT
Start: 2025-01-01 | End: 2025-05-22 | Stop reason: HOSPADM

## 2025-01-01 RX ORDER — HYDROMORPHONE HYDROCHLORIDE 1 MG/ML
0.25 INJECTION, SOLUTION INTRAMUSCULAR; INTRAVENOUS; SUBCUTANEOUS EVERY 4 HOURS PRN
Status: DISCONTINUED | OUTPATIENT
Start: 2025-01-01 | End: 2025-05-22 | Stop reason: HOSPADM

## 2025-01-01 RX ORDER — SODIUM CHLORIDE 0.9 % (FLUSH) 0.9 %
5-40 SYRINGE (ML) INJECTION PRN
Status: DISCONTINUED | OUTPATIENT
Start: 2025-01-01 | End: 2025-05-22 | Stop reason: HOSPADM

## 2025-01-01 RX ORDER — PANTOPRAZOLE SODIUM 40 MG/1
40 TABLET, DELAYED RELEASE ORAL
Status: DISCONTINUED | OUTPATIENT
Start: 2025-01-01 | End: 2025-01-01

## 2025-01-01 RX ORDER — ONDANSETRON 4 MG/1
4 TABLET, ORALLY DISINTEGRATING ORAL EVERY 8 HOURS PRN
Status: DISCONTINUED | OUTPATIENT
Start: 2025-01-01 | End: 2025-05-22 | Stop reason: HOSPADM

## 2025-01-01 RX ORDER — HYDROMORPHONE HYDROCHLORIDE 1 MG/ML
0.25 INJECTION, SOLUTION INTRAMUSCULAR; INTRAVENOUS; SUBCUTANEOUS ONCE
Status: COMPLETED | OUTPATIENT
Start: 2025-01-01 | End: 2025-01-01

## 2025-01-01 RX ORDER — POLYETHYLENE GLYCOL 3350 17 G/17G
17 POWDER, FOR SOLUTION ORAL DAILY PRN
Status: DISCONTINUED | OUTPATIENT
Start: 2025-01-01 | End: 2025-05-22 | Stop reason: HOSPADM

## 2025-01-01 RX ORDER — ONDANSETRON 2 MG/ML
4 INJECTION INTRAMUSCULAR; INTRAVENOUS EVERY 6 HOURS PRN
Status: DISCONTINUED | OUTPATIENT
Start: 2025-01-01 | End: 2025-05-22 | Stop reason: HOSPADM

## 2025-01-01 RX ORDER — ONDANSETRON 2 MG/ML
4 INJECTION INTRAMUSCULAR; INTRAVENOUS ONCE
Status: COMPLETED | OUTPATIENT
Start: 2025-01-01 | End: 2025-01-01

## 2025-01-01 RX ORDER — ISOSORBIDE MONONITRATE 30 MG/1
30 TABLET, EXTENDED RELEASE ORAL DAILY
Status: DISCONTINUED | OUTPATIENT
Start: 2025-01-01 | End: 2025-05-22 | Stop reason: HOSPADM

## 2025-01-01 RX ORDER — ACETAMINOPHEN 325 MG/1
650 TABLET ORAL EVERY 6 HOURS PRN
Status: DISCONTINUED | OUTPATIENT
Start: 2025-01-01 | End: 2025-05-22 | Stop reason: HOSPADM

## 2025-01-01 RX ORDER — CARVEDILOL 6.25 MG/1
6.25 TABLET ORAL 2 TIMES DAILY WITH MEALS
Status: DISCONTINUED | OUTPATIENT
Start: 2025-01-01 | End: 2025-01-01

## 2025-01-01 RX ORDER — SODIUM CHLORIDE 0.9 % (FLUSH) 0.9 %
5-40 SYRINGE (ML) INJECTION EVERY 12 HOURS SCHEDULED
Status: DISCONTINUED | OUTPATIENT
Start: 2025-01-01 | End: 2025-05-22 | Stop reason: HOSPADM

## 2025-01-01 RX ORDER — TRAMADOL HYDROCHLORIDE 50 MG/1
50 TABLET ORAL 2 TIMES DAILY
Status: DISCONTINUED | OUTPATIENT
Start: 2025-01-01 | End: 2025-05-22 | Stop reason: HOSPADM

## 2025-01-01 RX ORDER — ATORVASTATIN CALCIUM 10 MG/1
10 TABLET, FILM COATED ORAL DAILY
Status: DISCONTINUED | OUTPATIENT
Start: 2025-01-01 | End: 2025-01-01

## 2025-01-01 RX ORDER — HYDRALAZINE HYDROCHLORIDE 25 MG/1
25 TABLET, FILM COATED ORAL EVERY 8 HOURS SCHEDULED
Status: DISCONTINUED | OUTPATIENT
Start: 2025-01-01 | End: 2025-05-22 | Stop reason: HOSPADM

## 2025-01-01 RX ORDER — METOLAZONE 2.5 MG/1
2.5 TABLET ORAL ONCE
Status: DISCONTINUED | OUTPATIENT
Start: 2025-01-01 | End: 2025-05-22 | Stop reason: HOSPADM

## 2025-01-01 RX ORDER — ASPIRIN 81 MG/1
324 TABLET, CHEWABLE ORAL ONCE
Status: COMPLETED | OUTPATIENT
Start: 2025-01-01 | End: 2025-01-01

## 2025-01-01 RX ORDER — TORSEMIDE 20 MG/1
20 TABLET ORAL DAILY
Status: DISCONTINUED | OUTPATIENT
Start: 2025-01-01 | End: 2025-05-22 | Stop reason: HOSPADM

## 2025-01-01 RX ORDER — ASPIRIN 81 MG/1
81 TABLET, CHEWABLE ORAL DAILY
Status: DISCONTINUED | OUTPATIENT
Start: 2025-01-01 | End: 2025-01-01

## 2025-01-01 RX ADMIN — CINACALCET HYDROCHLORIDE 30 MG: 30 TABLET, FILM COATED ORAL at 10:20

## 2025-01-01 RX ADMIN — ACETAMINOPHEN 650 MG: 325 TABLET ORAL at 00:55

## 2025-01-01 RX ADMIN — ACETAMINOPHEN 650 MG: 325 TABLET ORAL at 10:20

## 2025-01-01 RX ADMIN — HYDROMORPHONE HYDROCHLORIDE 0.25 MG: 1 INJECTION, SOLUTION INTRAMUSCULAR; INTRAVENOUS; SUBCUTANEOUS at 13:26

## 2025-01-01 RX ADMIN — HYDROMORPHONE HYDROCHLORIDE 0.25 MG: 1 INJECTION, SOLUTION INTRAMUSCULAR; INTRAVENOUS; SUBCUTANEOUS at 22:31

## 2025-01-01 RX ADMIN — SODIUM CHLORIDE, PRESERVATIVE FREE 10 ML: 5 INJECTION INTRAVENOUS at 11:03

## 2025-01-01 RX ADMIN — TRAMADOL HYDROCHLORIDE 50 MG: 50 TABLET, COATED ORAL at 10:20

## 2025-01-01 RX ADMIN — ASPIRIN 324 MG: 81 TABLET, CHEWABLE ORAL at 19:00

## 2025-01-01 RX ADMIN — HYDROMORPHONE HYDROCHLORIDE 0.25 MG: 1 INJECTION, SOLUTION INTRAMUSCULAR; INTRAVENOUS; SUBCUTANEOUS at 20:14

## 2025-01-01 RX ADMIN — ONDANSETRON 4 MG: 2 INJECTION, SOLUTION INTRAMUSCULAR; INTRAVENOUS at 22:03

## 2025-01-01 RX ADMIN — ONDANSETRON 4 MG: 2 INJECTION, SOLUTION INTRAMUSCULAR; INTRAVENOUS at 11:03

## 2025-01-01 RX ADMIN — APIXABAN 2.5 MG: 2.5 TABLET, FILM COATED ORAL at 22:23

## 2025-01-01 RX ADMIN — SODIUM CHLORIDE, PRESERVATIVE FREE 10 ML: 5 INJECTION INTRAVENOUS at 22:23

## 2025-01-01 RX ADMIN — APIXABAN 2.5 MG: 2.5 TABLET, FILM COATED ORAL at 00:55

## 2025-01-01 RX ADMIN — TRAMADOL HYDROCHLORIDE 50 MG: 50 TABLET, COATED ORAL at 00:55

## 2025-01-01 RX ADMIN — APIXABAN 2.5 MG: 2.5 TABLET, FILM COATED ORAL at 10:20

## 2025-01-01 RX ADMIN — ACETAMINOPHEN 650 MG: 325 TABLET ORAL at 16:06

## 2025-01-01 ASSESSMENT — PAIN DESCRIPTION - ONSET
ONSET: ON-GOING

## 2025-01-01 ASSESSMENT — PAIN DESCRIPTION - ORIENTATION
ORIENTATION: ANTERIOR;POSTERIOR

## 2025-01-01 ASSESSMENT — PAIN DESCRIPTION - FREQUENCY
FREQUENCY: CONTINUOUS

## 2025-01-01 ASSESSMENT — PAIN SCALES - GENERAL
PAINLEVEL_OUTOF10: 0
PAINLEVEL_OUTOF10: 2
PAINLEVEL_OUTOF10: 4
PAINLEVEL_OUTOF10: 9
PAINLEVEL_OUTOF10: 5
PAINLEVEL_OUTOF10: 5

## 2025-01-01 ASSESSMENT — PAIN - FUNCTIONAL ASSESSMENT
PAIN_FUNCTIONAL_ASSESSMENT: NONE - DENIES PAIN
PAIN_FUNCTIONAL_ASSESSMENT: PREVENTS OR INTERFERES SOME ACTIVE ACTIVITIES AND ADLS

## 2025-01-01 ASSESSMENT — PAIN DESCRIPTION - PAIN TYPE
TYPE: ACUTE PAIN

## 2025-01-01 ASSESSMENT — ENCOUNTER SYMPTOMS: BACK PAIN: 1

## 2025-01-01 ASSESSMENT — PAIN DESCRIPTION - DESCRIPTORS
DESCRIPTORS: ACHING;SORE
DESCRIPTORS: ACHING;SORE
DESCRIPTORS: ACHING;DISCOMFORT
DESCRIPTORS: SORE

## 2025-01-01 ASSESSMENT — PAIN DESCRIPTION - LOCATION
LOCATION: HEAD;BACK
LOCATION: BACK;HEAD
LOCATION: HEAD;BACK
LOCATION: HEAD;BACK

## 2025-01-16 ENCOUNTER — OFFICE VISIT (OUTPATIENT)
Dept: CARDIOLOGY CLINIC | Age: 85
End: 2025-01-16

## 2025-01-16 ENCOUNTER — NURSE ONLY (OUTPATIENT)
Dept: CARDIOLOGY CLINIC | Age: 85
End: 2025-01-16

## 2025-01-16 VITALS — DIASTOLIC BLOOD PRESSURE: 72 MMHG | OXYGEN SATURATION: 98 % | HEART RATE: 95 BPM | SYSTOLIC BLOOD PRESSURE: 110 MMHG

## 2025-01-16 DIAGNOSIS — Z95.0 PACEMAKER: ICD-10-CM

## 2025-01-16 DIAGNOSIS — I50.21 ACUTE HEART FAILURE WITH MILDLY REDUCED EJECTION FRACTION (HFMREF, 41-49%) (HCC): ICD-10-CM

## 2025-01-16 DIAGNOSIS — I10 ESSENTIAL HYPERTENSION: ICD-10-CM

## 2025-01-16 DIAGNOSIS — I35.0 MODERATE AORTIC STENOSIS: ICD-10-CM

## 2025-01-16 DIAGNOSIS — Z95.0 PACEMAKER: Primary | ICD-10-CM

## 2025-01-16 DIAGNOSIS — Z79.01 CURRENT USE OF LONG TERM ANTICOAGULATION: ICD-10-CM

## 2025-01-16 DIAGNOSIS — R00.1 BRADYCARDIA: ICD-10-CM

## 2025-01-16 DIAGNOSIS — I44.2 CHB (COMPLETE HEART BLOCK) (HCC): Primary | ICD-10-CM

## 2025-01-16 DIAGNOSIS — I44.2 COMPLETE HEART BLOCK (HCC): ICD-10-CM

## 2025-01-16 NOTE — PROGRESS NOTES
[place] [address]  [phone]    Primary Cardiologist: Dr Burgos and Dr Mendoza     CC 6 month EP follow up with device check     HPI:  84 y.o. 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 She has right sided weakness post stroke. She had epistaxis while seen in office. She's had previous episodes of epistaxia and seen by ENT and bleed was cauterized.      She denies changes to LE edema. She denies cp, sob, LH/dizziness, palpitations, syncope or falls. No weight gain, orthopnea, abdominal bloating or early satiety. No n/v/d, fever or GI/ bleeding.      Past Medical History:   Diagnosis Date    AS (aortic stenosis)     mild/mod AS    CHB (complete heart block) (HCC)     pacemaker    CHF (congestive heart failure) (HCC)     HFmrEF EF 45-50% IIDD, mild/mod AS    Current long-term use of anticoagulant medication with history of deep venous thrombosis (DVT)     Hypertension     Pacemaker      Past Surgical History:   Procedure Laterality Date    CHOLECYSTECTOMY      COLONOSCOPY N/A 9/16/2024    COLONOSCOPY POLYPECTOMY SNARE/BIOPSY performed by Marcin Dorantes MD at [facility] ENDOSCOPY    HYSTERECTOMY (CERVIX STATUS UNKNOWN)      PACEMAKER INSERTION      UPPER GASTROINTESTINAL ENDOSCOPY N/A 09/13/2024    ESOPHAGOGASTRODUODENOSCOPY BIOPSY performed by Radha Gonzalez MD at [facility] ENDOSCOPY     Family History   Problem Relation Age of Onset    Stroke Mother      Social History     Tobacco Use    Smoking status: Never    Smokeless tobacco: Never   Substance Use Topics    Alcohol use: No    Drug use: No     Allergies:Oxycodone, Butrans [buprenorphine], Codeine, Doxycycline, Elavil [amitriptyline hcl], Entex lq [phenylephrine-guaifenesin], Feldene [piroxicam], Fentanyl, Isoptin [verapamil], Keflex [cephalexin], Morphine, Nabumetone, Ofloxacin, Peanut butter flavoring agent (non-screening), Prinivil [lisinopril], Seldane [terfenadine],

## 2025-04-29 ENCOUNTER — OFFICE VISIT (OUTPATIENT)
Dept: CARDIOLOGY CLINIC | Age: 85
End: 2025-04-29
Payer: MEDICARE

## 2025-04-29 VITALS
HEART RATE: 93 BPM | SYSTOLIC BLOOD PRESSURE: 142 MMHG | DIASTOLIC BLOOD PRESSURE: 74 MMHG | BODY MASS INDEX: 26.29 KG/M2 | WEIGHT: 158 LBS | OXYGEN SATURATION: 94 %

## 2025-04-29 DIAGNOSIS — I35.0 NONRHEUMATIC AORTIC VALVE STENOSIS: Primary | ICD-10-CM

## 2025-04-29 DIAGNOSIS — I44.2 COMPLETE HEART BLOCK (HCC): ICD-10-CM

## 2025-04-29 DIAGNOSIS — I50.22 CHRONIC HFREF (HEART FAILURE WITH REDUCED EJECTION FRACTION) (HCC): ICD-10-CM

## 2025-04-29 PROBLEM — I50.9 ACUTE ON CHRONIC CONGESTIVE HEART FAILURE (HCC): Status: RESOLVED | Noted: 2017-10-03 | Resolved: 2025-04-29

## 2025-04-29 PROBLEM — I50.43 CHF (CONGESTIVE HEART FAILURE), NYHA CLASS I, ACUTE ON CHRONIC, COMBINED (HCC): Status: RESOLVED | Noted: 2024-08-30 | Resolved: 2025-04-29

## 2025-04-29 PROBLEM — I50.33 ACUTE ON CHRONIC DIASTOLIC HEART FAILURE (HCC): Status: RESOLVED | Noted: 2017-12-21 | Resolved: 2025-04-29

## 2025-04-29 PROCEDURE — 1123F ACP DISCUSS/DSCN MKR DOCD: CPT | Performed by: INTERNAL MEDICINE

## 2025-04-29 PROCEDURE — G8417 CALC BMI ABV UP PARAM F/U: HCPCS | Performed by: INTERNAL MEDICINE

## 2025-04-29 PROCEDURE — 1159F MED LIST DOCD IN RCRD: CPT | Performed by: INTERNAL MEDICINE

## 2025-04-29 PROCEDURE — G2211 COMPLEX E/M VISIT ADD ON: HCPCS | Performed by: INTERNAL MEDICINE

## 2025-04-29 PROCEDURE — G8427 DOCREV CUR MEDS BY ELIG CLIN: HCPCS | Performed by: INTERNAL MEDICINE

## 2025-04-29 PROCEDURE — 99214 OFFICE O/P EST MOD 30 MIN: CPT | Performed by: INTERNAL MEDICINE

## 2025-04-29 PROCEDURE — 1090F PRES/ABSN URINE INCON ASSESS: CPT | Performed by: INTERNAL MEDICINE

## 2025-04-29 PROCEDURE — 1036F TOBACCO NON-USER: CPT | Performed by: INTERNAL MEDICINE

## 2025-04-29 NOTE — PROGRESS NOTES
Cc: HFpEF, high-grade block status post dual-chamber pacer.    HPI:     Patient is an 83-year-old woman with history of HTN, CKD 3, DVT (10/2020) currently on Eliquis, high-grade block status post MDT dual-chamber pacer 7/16/2019, currently 100% V paced, code status DNR.    Echo 07/2019: Mild LVH, LVEF 60%, grade 2 diastolic dysfunction, normal RV, mild TR.  Compared to echo 10/2017, no changes.    No recent ischemic evaluation    Echo 08/2024: Normal LV, LVEF 45-50%, grade 2 diastolic dysfunction, no LV apical clot, mild RV dysfunction, moderate to severe AS by 2D, dopplers suboptimal (Vmax 2.2 m/s, mPG 11, DI 0.52), moderate LAE.     Patient returns to the clinic today for follow-up. Patient reports no rest or exertional ischemic or congestive symptoms, denies palpitations or syncope.       Histories     Past Medical History:   has a past medical history of AS (aortic stenosis), CHB (complete heart block) (HCC), CHF (congestive heart failure) (HCC), Current long-term use of anticoagulant medication with history of deep venous thrombosis (DVT), Hypertension, and Pacemaker.    Surgical History:   has a past surgical history that includes Hysterectomy; Cholecystectomy; Upper gastrointestinal endoscopy (N/A, 09/13/2024); Pacemaker insertion; and Colonoscopy (N/A, 9/16/2024).     Social History:   reports that she has never smoked. She has never used smokeless tobacco. She reports that she does not drink alcohol and does not use drugs.     Family History:  No evidence for sudden cardiac death or premature CAD      Medications:     Home medications were reviewed and are listed below    Prior to Admission medications    Medication Sig Start Date End Date Taking? Authorizing Provider   isosorbide mononitrate (IMDUR) 30 MG extended release tablet TAKE 1 TABLET BY MOUTH EVERY DAY 3/4/25  Yes Mervin Merino MD   traMADol (ULTRAM) 50 MG tablet Take 1 tablet by mouth in the morning and at bedtime for 90 days. Intended

## 2025-05-20 PROBLEM — N17.9 AKI (ACUTE KIDNEY INJURY): Status: ACTIVE | Noted: 2025-01-01

## 2025-05-20 NOTE — ED PROVIDER NOTES
THE [facility]  EMERGENCY DEPARTMENT ENCOUNTER          EM RESIDENT NOTE       Date of evaluation: 5/20/2025    Chief Complaint     Fatigue      History of Present Illness     Allegra Reina is a 84 y.o. female with PMH HTN, CHF, CKD, complete heart blocks s/p pacemaker, PE (on eliquis) who presents with fatigue.    Patient states that she was unable to sleep well last night due to back pain.  She reports pain in her back between her shoulder blades, she states that she has gotten this before but could not get comfortable last night, so did not sleep much.  She has been fatigued throughout the day today.  She also endorses nausea and a decreased appetite.  She has not eaten much due to this.  1 episode of vomiting this morning, nonbloody nonbilious.  Denies abdominal pain, chest pain, shortness of breath, fevers.      MEDICAL DECISION MAKING / ASSESSMENT / PLAN     INITIAL VITALS: BP: (!) 99/50, Temp: 97.8 °F (36.6 °C), Pulse: 86, Respirations: 18, SpO2: 94 %    Allegra Reina is a 84 y.o. female with PMH HTN, CHF, CKD, complete heart blocks s/p pacemaker, PE (on eliquis) who presents with fatigue and vague back pain.      On presentation, patient is resting comfortably in no acute distress.  She is mildly hypotensive to the 90s systolic, not tachycardic, afebrile, satting well room air.  Physical exam is overall unremarkable.  She has thready pulses in all 4 extremities, but no discrepancy.  Will perform a broad workup including infectious, cardiac, will evaluate electrolytes and liver function.    Troponin resulted at 9846.  EKG obtained shows a paced rhythm without obvious ischemic changes, repeat EKG was changed and has not acutely changed from the initial.  Chest x-ray shows no focal pneumonia, no widened mediastinum.  Patient was aspirin loaded with 324 mg.  On the differential includes NSTEMI versus dissection given her vague nonspecific back pain.  She does have a history of CKD and is not  Thready pulses in all four extremities  Pulmonary:      Effort: Pulmonary effort is normal. No respiratory distress.      Breath sounds: Normal breath sounds.   Abdominal:      General: Abdomen is flat. There is no distension.      Palpations: Abdomen is soft.      Tenderness: There is no abdominal tenderness.   Musculoskeletal:         General: Normal range of motion.      Cervical back: Normal range of motion and neck supple.   Skin:     General: Skin is warm and dry.   Neurological:      General: No focal deficit present.      Mental Status: She is alert and oriented to person, place, and time.                 Chrissy Johnson MD  Resident  05/20/25 1952

## 2025-05-20 NOTE — PROGRESS NOTES
Called by Dr. Mancera, noninterventional cardiology, regarding patient.  Patient presented with acute neck and back discomfort.  She has a past medical history of cardiomyopathy with mild LV systolic dysfunction and moderate to severe aortic stenosis, by echo August 2024, which may be low-flow low gradient and also has CKD stage IV.  She has now developed hypotension, significant hyponatremia, anion gap acidosis, marked elevation of AST as well as marked elevation of cardiac troponin in the range of 9800.  EKG is paced rhythm and not significantly changed from prior ECG.  Has follow-up with cardiologist Dr. Mendoza of our group.  ECG and echo reviewed.  Echo suggests wall motion abnormality in addition to significant aortic stenosis.  Given patient's advanced age and multiple severe comorbidities, she is not a candidate for invasive cardiac diagnostics or therapeutics.  Aortic dissection was discussed but best evaluation would be CT chest with contrast but would most certainly cost to her being on dialysis.  THEODORA would be a potential other option but she is unstable to proceed with such.  She takes Eliquis.  Risk of morbidity and mortality is high despite invasive or noninvasive management.  Would recommend noninvasive management.

## 2025-05-20 NOTE — ED TRIAGE NOTES
Pt arrives in the ED after feeling more generally weak today. Pt states she was unable to get out of the chair today. Denies CP/SOB. Pt states she has [place] Palsy and has slight left sided facial droop.

## 2025-05-20 NOTE — PROGRESS NOTES
On-call service called regarding this patient.  84-year-old with cardiomyopathy with mild LV dysfunction, moderate to severe aortic stenosis 8/2024, possibly low-flow low gradient, and CKD stage IV, who presented this evening with acute neck and back discomfort.  She is found to be hypotensive, anion gap acidosis, hyponatremia, and with troponin of 9800.  EKG is paced rhythm.  There is discordant ST elevation that is similar to previous in January in the setting of pacing.  On review of office visits with her cardiologist Dr. Mendoza, it appears that a mutual agreement to take a conservative approach to management of her aortic stenosis.  Patient tonight identified to ER MD that she would not like to undergo CTA to rule out dissection owing to concern for worsening renal function and need for dialysis which would be unacceptable quality of life it seems.  Likewise, cardiac catheterization would predispose to renal compromise and may worsen an acute dissection.  The patient does not seem stable enough for a THEODORA.  Case was discussed with interventionalist on-call who agrees with conservative management.    On calling back to relay recommendations, physician assuming care in the ED had spoken with family for leaning toward DNR-CCA approach to her care.  I think this is the best approach at this point as it seems any further findings were discovered by further testing would be conservatively managed as she is not an appropriate candidate for invasive interventions.    Admission to medicine for comfort care measures seems like best path forward.  While ACS may be her unifying diagnosis, I would not heparinize unless aortic dissection was ruled out definitively.  Treat symptoms.     Please call our service if further concerns arise that we can be of assistance with.      Herrera Mancera MD, [facility]  Cardiovascular Disease  [facility]  [phone] [place] Office  [phone] [place] Office

## 2025-05-20 NOTE — ED PROVIDER NOTES
ED Attending Attestation Note     Date of evaluation: 5/20/2025    This patient was seen by the resident.  I have seen and examined the patient, agree with the workup, evaluation, management and diagnosis. The care plan has been discussed.      I have reviewed the ECG and concur with the resident's interpretation.      My assessment reveals an 84-year-old female who presents to the emergency department with a complaint of generalized weakness that began suddenly earlier today.  She is also complaining of a vague sense of pain and discomfort in her back and is unable to find a position of comfort.  On examination she has no evidence of any murmurs rubs or gallops on cardiac auscultation her lung sounds are clear.  Extremities are warm and well-perfused abdomen is soft and nontender.  The patient was EKG was noted to have atrial sensed ventricularly paced rhythm not substantially changed from prior.  Her troponin came back significantly elevated at nearly 10,000 on initial check.  The patient had a repeat EKG showing no evidence of any interval changes that would be consistent with ongoing dynamic ischemia.  I did speak with the patient's nephrologist regarding her likely admission to the hospital and ongoing medical condition.  We are also discussing her case with cardiology as she may be someone who would be amenable for an earlier intervention strategy.  Ordered for aspirin 324 by mouth.  Is on Eliquis at home so heparin withheld initially pending discussions with cardiology.     Due to the immediate potential for life-threatening deterioration due to non-ST elevation MI, I spent 31 minutes providing direct bedside critical care.  This time is excluding time spent supervising residents and time spent performing separately billed procedures       Palmer Araujo MD  05/20/25 9917

## 2025-05-21 NOTE — ED PROVIDER NOTES
THE [facility]  EMERGENCY DEPARTMENT ENCOUNTER          ADVANCED PRACTICE PROVIDER NOTE     Date of evaluation: 5/20/2025    ADDENDUM:      Care of this patient was assumed from my colleague, Dr. Chrissy Johnson.  The patient was previously evaluated by the prior emergency department team for Fatigue. Please see their note for the patient's history of present illness, and the prior ED [rovider's assessment, evaluation, diagnostics and plan.  The patient's initial evaluation and plan have been discussed with the prior provider who initially evaluated the patient. A repeat evaluation of the patient was performed by myself at the time of turnover. Nursing Notes, Past Medical Hx, Past Surgical Hx, Social Hx, Allergies, and Family Hx were all reviewed.    ASSESSMENT / PLAN  (MEDICAL DECISION MAKING)     Allegra Merino is a 84 y.o. female who presents to the emergency department with a complaint of fatigue and back pain across her scapulas.   Briefly, the patient presented to the emergency department for increasing and worsening back pain and fatigue.  She reports that the pain caused her to be very uncomfortable last night, so she did not sleep very much.  She has had increasing fatigue through the day and according to family has become somewhat pale.  She has also been nauseated with a significantly decreased appetite and has not had much oral intake.  She did have 1 episode of nonbloody, nonbilious emesis in the morning.  On evaluation in the emergency department, the patient was hypotensive with a systolic pressure in the 90s however not tachycardic, though the patient has a pacemaker providing continuous ventricular pacing at a rate around 80.  She does demonstrate some intermittent ventricular ectopy.  Chest x-ray was unremarkable.  Her initial troponin was significantly elevated at 9846.  The initial ED team was working from a differential including aortic dissection versus progressing NSTEMI.  However,  note for details of the ECG interpretation.  Briefly, the patient demonstrates a ventricular paced rhythm unchanged from prior ECGs.    RECENT VITALS:  BP: 103/65, Temp: 97.8 °F (36.6 °C), Pulse: 78, Respirations: 16, SpO2: (!) 89 %     Procedures     N/A    ED Course          The patient was given the following medications:  Orders Placed This Encounter   Medications    aspirin chewable tablet 324 mg    HYDROmorphone HCl PF (DILAUDID) injection 0.25 mg    ondansetron (ZOFRAN) injection 4 mg       CONSULTS:  IP CONSULT TO NEPHROLOGY  IP CONSULT TO CARDIOLOGY          Perez, TOBI Ruiz  05/20/25 0018

## 2025-05-21 NOTE — PLAN OF CARE
Problem: Discharge Planning  Goal: Discharge to home or other facility with appropriate resources  Outcome: Progressing  Note: Plan for discharge home with hospice 5/22. Plan of care continues.      Problem: Pain  Goal: Verbalizes/displays adequate comfort level or baseline comfort level  Outcome: Progressing  Note: PRN pain medications administered per the MAR for head and back pain. Patient and family educated on available pain medications, side effects, and verbalized understanding. Frequent turning and repositioning for comfort and pressure relief. Notified family of pain medication administration schedule. Plan of care continues.      Problem: Skin/Tissue Integrity  Goal: Skin integrity remains intact  Description: 1.  Monitor for areas of redness and/or skin breakdown2.  Assess vascular access sites hourly3.  Every 4-6 hours minimum:  Change oxygen saturation probe site4.  Every 4-6 hours:  If on nasal continuous positive airway pressure, respiratory therapy assess nares and determine need for appliance change or resting period  Outcome: Progressing  Note: Frequent turning and repositioning for comfort and pressure relief. Heels and elbows elevated off bed surface. Tobias cath in place and cleaned with soap/water. Plan of care continues.      Problem: Safety - Adult  Goal: Free from fall injury  Outcome: Progressing  Note: Fall precautions in place. Bed locked in lowest position with alarm on and side rails up. Bedside table, belongings, and call light within reach. Bedside table, belongings, and call light within reach. Hourly rounding by RN. Floor clean and free from clutter. Room door open. Plan of care continues.

## 2025-05-21 NOTE — PROGRESS NOTES
Patient is A/Ox4. Very lethargic throughout shift but awakens to name and can follow commands.   VSS, on 2 L O2 nasal cannula. PRN pain medications administered per the MAR for head and back pain.   Tolerating diet well, decreased appetite. Zofran given for nausea. No episodes of emesis noted. Taking pills crushed in apple sauce.   Very little urine output, 115 mL from enrique all shift. Attending Dr. Villa made aware via secure message, no new orders placed.   Plan for discharge home with hospice tomorrow.   Fall precautions in place. Bed locked in lowest position with alarm on. Hourly rounding by RN. Plan of care continues.    You were seen in the ER for constipation. Your lab results showed an elevated creatinine which reflects your kidney function. Please follow up with your primary care doctor for repeat labwork. Your CT scan showed some scarring around your kidney which you should also follow up with your primary care doctor for. You did not have a urinary tract infection. You can use miralax and enemas for your constipation. You can take tylenol for your pain. Please follow the instructions on the packaging.    Please follow up with your primary care doctor.    Please return to the ER if you have worsening symptoms including fever, chest pain, shortness of breath, abdominal pain, nausea, vomiting, diarrhea, weakness or lightheadedness/fainting. You were seen in the ER for constipation. Your lab results showed an elevated creatinine which reflects your kidney function. Please follow up with your primary care doctor for repeat labwork. Your CT scan showed some scarring around your kidney which you should also follow up with your primary care doctor for. You did not have a urinary tract infection. You can use miralax and enemas for your constipation. You can take tylenol for your pain. Please follow the instructions on the packaging.    Please follow up with your primary care doctor.    Please return to the ER if you have worsening symptoms including fever, chest pain, shortness of breath, abdominal pain, nausea, vomiting, diarrhea, weakness or lightheadedness/fainting.    Constipation    Constipation is when a person has fewer than three bowel movements a week, has difficulty having a bowel movement, or has stools that are dry, hard, or larger than normal. Other symptoms can include abdominal pain or bloating. As people grow older, constipation is more common. A low-fiber diet, not taking in enough fluids, and taking certain medicines, including opioid painkillers, may make constipation worse. Treatment varies but may include dietary modifications (more fiber-rich foods), lifestyle modifications, and possible medications.     SEEK IMMEDIATE MEDICAL CARE IF YOU HAVE ANY OF THE FOLLOWING SYMPTOMS: bright red blood in your stool, constipation for longer than 4 days, abdominal or rectal pain, unexplained weight loss, or inability to pass gas.

## 2025-05-21 NOTE — PROGRESS NOTES
EMR was reviewed.  Patient was seen by Dr. Mancera and Dr. Raya.  Recommended palliative care approach and conservative management.  For further details please review their notes.  Per EMR, family meeting to discuss CODE STATUS and hospice care later today.    Cardiology will now sign off.  Please call us back if family wishes further testing despite high risk for renal failure and possible dialysis.        Nazario Mendoza MD, [facility], [place] [address]  Ph: [phone]  Fax: [phone]

## 2025-05-21 NOTE — CARE COORDINATION
Case Management Assessment  Initial Evaluation    Date/Time of Evaluation: 5/21/2025 11:57 AM  Assessment Completed by: Beryl Reis RN    If patient is discharged prior to next notation, then this note serves as note for discharge by case management.    Patient Name: Allegra Reina                   YOB: 1940  Diagnosis: NSTEMI (non-ST elevated myocardial infarction) (HCC) [I21.4]  PROSPER (acute kidney injury) [N17.9]  Fatigue, unspecified type [R53.83]                   Date / Time: 5/20/2025  5:28 PM    Patient Admission Status: Inpatient   Readmission Risk (Low < 19, Mod (19-27), High > 27): Readmission Risk Score: 18.3    Current PCP: Herrera Oliva, DO  PCP verified by CM? No    Chart Reviewed: Yes      History Provided by: Patient  Patient Orientation: Alert and Oriented    Patient Cognition: Alert    Hospitalization in the last 30 days (Readmission):  No    If yes, Readmission Assessment in CM Navigator will be completed.    Advance Directives:      Code Status: DNR-CC   Patient's Primary Decision Maker is: Named in Scanned ACP Document    Primary Decision Maker: Siva Reina - Spouse - [phone]    Discharge Planning:    Patient lives with: Spouse/Significant Other Type of Home: House  Primary Care Giver: Family  Patient Support Systems include: Spouse/Significant Other   Current Financial resources: Medicare  Current community resources: None  Current services prior to admission: None            Current DME:              Type of Home Care services:  None    ADLS  Prior functional level: Assistance with the following:, Bathing, Dressing, Mobility  Current functional level: Assistance with the following:, Bathing, Dressing, Mobility    PT AM-PAC:   /24  OT AM-PAC:   /24    Family can provide assistance at DC: Yes  Would you like Case Management to discuss the discharge plan with any other family members/significant others, and if so, who? Yes (spouse Riaz)  Plans to Return

## 2025-05-21 NOTE — PROGRESS NOTES
VSS on 2L NC, afebrile. Alert and oriented x4. Pain treated with PRN medication, relief noted.  at bedside. No acute events overnight. All fall & safety precautions in place. Pt shows no signs of distress at this time. Call light within reach. Continue current plan of care.

## 2025-05-21 NOTE — CONSULTS
dilaudid for symptom management of back pain.    1. Goals of Care/Advanced Care planning/Code status: Comfort care with plans for home with hospice / DNR CC  2. Pain: Moderate pain; will add PRN dilaudid  3. SOB: Denies, on 2L NC  4. Disposition: Home with hospice    Reason for Consult:         [x]  Goals of Care  [x]  Code Status Discussion/Advanced Care Planning   []  Psychosocial/Family Support  []  Symptom Management  []  Other (Specify)    Requesting Physician: Dr. Villa     CHIEF COMPLAINT:  fatigue    History Obtained From:  spouse, family member - son, electronic medical record    History of Present Illness:         Allegra Reina is a 84 y.o. female with PMH of heart failure, severe AS, CHB with dual chamber pacemaker, CKD4 who presented last night for fatigue and back pain. On presentation was hypotensive to MAP 66. In the ED patient was found to have elevated trop 99 > 10k, as well as elevated Cr to 3.6. She was diagnosed with NSTEMI, however per cardiology she is not a good candidate for invasive diagnostic or therapeutic procedures such as LHC or THEODORA. Given that she is close to needing dialysis, nephrology was consulted. Additionally, CTA was not pursued to r/o aortic dissection due to her PROSPER on CKD, risk of contrast likely making her dialysis depended. Patient was admitted for symptom management of back pain and further goals of care discussions.     Subjective:         Past Medical History:        Diagnosis Date    AS (aortic stenosis)     mild/mod AS    CHB (complete heart block) (HCC)     pacemaker    CHF (congestive heart failure) (HCC)     HFmrEF EF 45-50% IIDD, mild/mod AS    Current long-term use of anticoagulant medication with history of deep venous thrombosis (DVT)     Hypertension     Pacemaker      Past Surgical History:        Procedure Laterality Date    CHOLECYSTECTOMY      COLONOSCOPY N/A 9/16/2024    COLONOSCOPY POLYPECTOMY SNARE/BIOPSY performed by Marcin Dorantes MD at  Nucynta [tapentadol], Penicillins, Premarin [conjugated estrogens], Tetracyclines & related, and Trazodone and nefazodone    Social History:    TOBACCO: reports that she has never smoked. She has never used smokeless tobacco.  ETOH:   reports no history of alcohol use.  Patient currently lives with family     Review of Systems -   Review of Systems   Reason unable to perform ROS: limited as patient is lethargic.   Constitutional:  Positive for fatigue.   Musculoskeletal:  Positive for back pain.   : A 10 point review of systems was conducted, significant findings as notedin HPI.    Objective:        Physical Exam  Constitutional:       General: She is not in acute distress.     Appearance: She is ill-appearing (chronically). She is not diaphoretic.      Comments: Lethargic   HENT:      Head: Normocephalic and atraumatic.      Mouth/Throat:      Mouth: Mucous membranes are dry.   Cardiovascular:      Rate and Rhythm: Normal rate.      Pulses: Normal pulses.   Pulmonary:      Effort: Pulmonary effort is normal. No respiratory distress.      Comments: 2L NC  Abdominal:      General: Abdomen is flat. There is no distension.      Tenderness: There is no abdominal tenderness.   Skin:     General: Skin is dry.      Coloration: Skin is pale.   Neurological:      Comments: AOX2.         Palliative Performance Scale:  [] 60% Ambulation reduced; Significant disease; Can't do hobbies/housework; intake normal or reduced; occasional assist; LOC full/confusion  [] 50% Mainly sit/lie; Extensive disease; Can't do any work; Considerable assist; intake normal  Or reduced; LOC full/confusion  [x] 40% Mainly in bed; Extensive disease; Mainly assist; intake normal or reduced; occasional assist; LOC full/confusion  [] 30% Bed Bound; Extensive disease; Total care; intake reduced; LOC full/confusion  [] 20% Bed Bound; Extensive disease; Total care; intake minimal; Drowsy/coma  [] 10% Bed Bound; Extensive disease; Total care; Mouth

## 2025-05-21 NOTE — ED NOTES
Patient Name: Allegra Reina  : 1940 84 y.o.  MRN: [id number]  ED Room #: A04/A04-04     Chief complaint:   Chief Complaint   Patient presents with    Fatigue     Hospital Problem/Diagnosis:   Hospital Problems           Last Modified POA    * (Principal) PROSPER (acute kidney injury) 2025 Yes         O2 Flow Rate:    (if applicable)  Cardiac Rhythm:   (if applicable)  Active LDA's:   Peripheral IV 25 Right Antecubital (Active)      Urinary Catheter 25 2 Way (Active)          How does patient ambulate? Bed Bound    2. How does patient take pills? Whole with Water    3. Is patient alert? Alert    4. Is patient oriented? To Person, To Place, To Time, To Situation, and Follows Commands    5.   Patient arrived from:  home  Facility Name: ___________________________________________    6. If patient is disoriented or from a Skill Nursing Facility has family been notified of admission?  Family at bedside    7. Patient belongings? Belongings: Cell Phone, Wallet, and Clothing    Disposition of belongings? Sent with Family     8. Any specific patient or family belongings/needs/dynamics?   a. NA    9. Miscellaneous comments/pending orders?  a. NA     If there are any additional questions please reach out to the Emergency Department.      Iván Everett RN  [phone]

## 2025-05-21 NOTE — PROGRESS NOTES
[facility]    Met with patient who remained quiet but was listening  sons and daughters at bedside to discuss hospice philosophy and services. Reviewed covered and non covered items medications and equipment along with levels of care and how the inpatient unit is utilized for managing symptoms and respite. Plan is for patient to return home with hospice care. Equipment has been ordered and will be delivered tomorrow between 10-1p. [facility] patient home at 4pm on 5/22. will be here in the morning tomorrow to sign consent if patient is unable-he's HCPOA. Packet is at  for transport. Nurse will need to add AVS to packet. Please leave enrique in place but remove IV's prior to discharge.Hospice RN will be here tomorrow assist with any discharge needs and to get consent. Please call with any questions or concerns.      Aye Stafford RN  [phone]

## 2025-05-21 NOTE — PROGRESS NOTES
V2.0    [facility] Progress Note      Name:  Allegra Reina /Age/Sex: 1940  (84 y.o. female)   MRN & CSN:  [id number] & [id number] Encounter Date/Time: 2025 9:26 AM EDT   Location:  5519/5519-01 PCP: Herrera Oliva DO     Attending:Daisy Villa MD       Hospital Day: 2    HPI:    Assessment and Recommendations     Hospital course:    Allegra Reina is a 84 y.o. female with pmh of cardiac apathy with mild systolic dysfunction, moderate to severe aortic stenosis, CKD stage IV who presents with PROSPER (acute kidney injury), hypotension, with hyponatremia markedly elevated troponin in the 9000's..  There was concern for possible dissection and possible need for left heart cath but given her CKD, hypotension and the fact that the patient is DNR CC it was recommended for conservative approach      Plan:     NSTEMI with coronary artery disease  - Troponin greater than 10,000  - EKG paced  - Interventional cardiology recommends conservative treatment given patient's goals of care    PROSPER on CKD stage IV with hyperkalemia-creatinine worse  - Nephrology consulted appreciated  - Hold all nephrotoxic medication    History of heart failure with reduced EF chronic  - Continue Demadex    Hyperlipidemia  - Statin as tolerated    Hyponatremia worse  - Patient did get a dose of metolazone per nephrology    Hypercalcemia  - Improved      Palliative care consulted for goals of care, family elected for hospice.  Hospice to talk to family at 3:00      I spent > 55  minutes in the care of this patient.  Over 50% of that time was in face-to-face counseling regarding disease process, diagnostic testing, preventative measures, and answering patient and family questions.     Diet ADULT DIET; Regular   DVT Prophylaxis [] Lovenox, []  Heparin, [] SCDs, [] Ambulation,  [] Eliquis, [] Xarelto  [] Coumadin   Code Status DNR-CC   Disposition   Possibly home with hospice     Surrogate Decision Maker/ POA       BLOODU SMALL 05/20/2025 07:59 PM    GLUCOSEU Negative 05/20/2025 07:59 PM    KETUA Negative 05/20/2025 07:59 PM     Urine Cultures:   Lab Results   Component Value Date/Time    LABURIN  08/30/2024 05:31 PM     >50,000 CFU/ml mixed pathogens.  Multiple organisms isolated, no predominance. Culture  indicates probable contamination. Please review colony count  and clinical indications to determine if a repeat culture is  necessary. No further workup to be done.       Blood Cultures:   Lab Results   Component Value Date/Time    BC No growth after 5 days of incubation. 07/15/2019 12:09 PM     Lab Results   Component Value Date/Time    BLOODCULT2 No growth after 5 days of incubation. 07/15/2019 01:15 PM     Organism:   Lab Results   Component Value Date/Time    ORG Escherichia coli 10/02/2017 02:59 PM         Electronically signed by Daisy Villa MD on 5/21/2025 at 9:26 AM  Comment: Please note this report has been produced using speech recognition software and may contain errors related to that system including errors in grammar, punctuation, and spelling, as well as words and phrases that may be inappropriate. If there are any questions or concerns, please feel free to contact the dictating provider for clarification.

## 2025-05-21 NOTE — H&P
Hospital Medicine History & Physical      Date of Admission: 5/20/2025    Date of Service:  Pt seen/examined on 05/20/25     [x]Admitted to Inpatient with expected LOS greater than two midnights due to medical therapy.  []Placed in Observation status.    Chief Admission Complaint: Back pain and fatigue    Presenting Admission History:      84 y.o. female with PMHx significant for back pain and fatigue for the last few days.  Patient denies any chest pain or shortness of breath at this time.  In ED patient was noted to have significantly elevated troponin at 98.6 with repeat level greater than 10,000.  Creatinine of 3.6.  Patient denies again any chest discomfort at this point.  Patient is DNR CC with history of advanced kidney disease.  Patient was supposed to have chest CTA to rule out aortic dissection.  Cardiology and nephrology were consulted.  Cardiology recommended comfort measures given the patient's DNRCC and multiple comorbidities with elevated creatinine precluding contrast administration needed for heart cath.  Patient is currently admitted under inpatient status for further management and evaluation.  Patient is currently comfortable denies any other complaints at this time.     ROS:     Review of 10 systems is negative except what is outlined in HPI.     Assessment:  NSTEMI.  Acute kidney injury on CKD stage IV.  Elevated liver enzymes.  History of DVT/PE on Eliquis.  Heart failure with reduced ejection fraction, chronic.  Coronary artery disease.  Hyperlipidemia    Plan:    Patient is admitted under inpatient status.  Patient is already on Eliquis, hold off on heparin drip for now.  Patient not a candidate for cardiac intervention given her advanced age comorbidities and elevated creatinine.  Resume home meds as appropriate.  Patient is currently DNR CCA, need to have a discussion with the family tomorrow regarding goals of care and possibly pursuing comfort measures and potentially consulting  hospice.  CODE STATUS: DNR CC    Physical Exam Performed:      /79   Pulse 82   Temp 97.7 °F (36.5 °C) (Oral)   Resp 18   Ht 1.651 m (5' 5\")   Wt 77.7 kg (171 lb 4.8 oz)   SpO2 94%   BMI 28.51 kg/m²     General appearance:  No apparent distress, appears stated age and cooperative.  HEENT:  Pupils equal, round, and reactive to light. Conjunctivae/corneas clear.  Respiratory:  Normal respiratory effort. Clear to auscultation, bilaterally without Rales/Wheezes/Rhonchi.  Cardiovascular:  Regular rate and rhythm with normal S1/S2 without murmurs, rubs or gallops.  Abdomen:  Soft, non-tender, non-distended with normal bowel sounds.  Musculoskelatal:  No clubbing, cyanosis or edema bilaterally.  Full range of motion without deformity.  Neurologic:  Neurovascularly intact without any focal sensory/motor deficits. Cranial nerves: II-XII intact, grossly non-focal.  Psychiatric:  Alert and oriented, thought content appropriate, normal insight  Skin:  Skin color, texture, turgor normal.  No rashes or lesions.  Capillary Refill:  Brisk,< 3 seconds   Peripheral Pulses:  +2 palpable, equal bilaterally         --------------------------------------------------------------------------------------------------------------------------------------------------------------------    Imaging:     XR CHEST PORTABLE  Result Date: 5/20/2025  EXAM: XR CHEST PORTABLE, 5/20/2025 19:19 INDICATION: Fatigue, eval pneumonia COMPARISON: 8/30/2024 FINDINGS: HEART / MEDIASTINUM: The heart size and mediastinal contours, stable dual-chamber pacemaker LUNGS/PLEURA: Lungs are expiratory with bibasilar bronchovascular crowding and atelectasis. No dense consolidation. BONES / SOFT TISSUES: No acute abnormality. OTHER: None.     1. Expiratory lungs, no gross focal pneumonia Electronically signed by Iván Walker MD      PCP: Herrera Oliva DO    Past Medical History:        Diagnosis Date    AS (aortic stenosis)     mild/mod AS    CHB

## 2025-05-21 NOTE — ED NOTES
Pt placed on 2 liters 02 nasal cannula due to 02 sats in high 80's while pt sleeping.     Iván Everett, RN  05/20/25 [id number]

## 2025-05-21 NOTE — PLAN OF CARE
Problem: Chronic Conditions and Co-morbidities  Goal: Patient's chronic conditions and co-morbidity symptoms are monitored and maintained or improved  Outcome: Progressing     Problem: Discharge Planning  Goal: Discharge to home or other facility with appropriate resources  Outcome: Progressing     Problem: Pain  Goal: Verbalizes/displays adequate comfort level or baseline comfort level  Outcome: Progressing   Pt pain managed via MAR.     Problem: Skin/Tissue Integrity  Goal: Skin integrity remains intact  Description: 1.  Monitor for areas of redness and/or skin breakdown2.  Assess vascular access sites hourly3.  Every 4-6 hours minimum:  Change oxygen saturation probe site4.  Every 4-6 hours:  If on nasal continuous positive airway pressure, respiratory therapy assess nares and determine need for appliance change or resting period  Outcome: Progressing   Note: Pt turning q2 with pillow support and PRN, tolerating well. Skin assessed this shift.     Problem: Safety - Adult  Goal: Free from fall injury  Outcome: Progressing   Note: Pt educated on safety measures. Standard safety measures in place, bed in lowest position, bed locked and alarm on,  socks applied, call light and table in reach.

## 2025-05-21 NOTE — CONSULTS
Shilpa BENDER [facility]       Nephrology Progress Note    Allegra SANDOVAL Herrera Mg, DO    Assessment/Plan     ACUTE MI, Troponin > 10,000    PROSPER on CKD 4     CHF      Anemia    NSAID nephropathy     Severe OA      Plan   Family and patient has leaned towards comfort measures.   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 She has a past medical history of moderate to severe aortic stenosis, and also has CKD stage IV. She has now developed hypotension, marked elevation of cardiac troponin in the range of 9800. Echo today suggests wall motion abnormality in addition to significant aortic stenosis. Given patient's advanced age and multiple severe comorbidities, she is not a candidate for invasive cardiac diagnostics or therapeutics. Risk of morbidity and mortality is high despite invasive or noninvasive management. Cardiology recommended noninvasive management.       Has CKD 4 sec to cardiorenal syndrome   GFR stable with mild fluctuations     Edema better     GFR worse as she had diarrhea and was not eating well either     On sensipar daily   Ca 10.4     Appetite is good  No N/V/D    Torsemide based on weights   No SOB  BNP 16K --> 14K     BP controlled  No dizziness  No lightheadedness      Wt Readings from Last 3 Encounters:   05/20/25 77.7 kg (171 lb 4.8 oz)   04/29/25 71.7 kg (158 lb)  195 05/20/2025 06:11 PM    MPV 9.9 05/20/2025 06:11 PM      BMP:   Lab Results   Component Value Date/Time     05/20/2025 07:11 PM    K 6.0 05/20/2025 07:11 PM    CL 92 05/20/2025 07:11 PM    CO2 18 05/20/2025 07:11 PM    BUN 65 05/20/2025 07:11 PM    CREATININE 3.6 05/20/2025 07:11 PM    GLUCOSE 139 05/20/2025 07:11 PM    CALCIUM 10.7 05/20/2025 07:11 PM      PHOSPHORUS:    Lab Results   Component Value Date/Time    PHOS 5.0 04/29/2025 12:53 PM     MAGNESIUM:   Lab Results   Component Value Date/Time    MG 3.51 05/20/2025 07:11 PM     ALBUMIN:   No results found for: \"LABALBU\"    PTH: No components found for: \"PTHINTACT\"  VIT D3,25 HYDROXY: No results found for: \"VITD3\"     IRON:    Lab Results   Component Value Date/Time    IRON 53 04/29/2025 12:53 PM     IRON SATURATION:  No components found for: \"LABIRON\"  TIBC:    Lab Results   Component Value Date/Time    TIBC 223 09/14/2024 05:11 AM     FERRITIN:    Lab Results   Component Value Date/Time    FERRITIN 57.6 04/29/2025 12:53 PM             GETACHEW: No results found for: \"GETACHEW\"    SPEP:   No results found for: \"ALBCAL\", \"ALBPCT\", \"LABALPH\", \"A1PCT\", \"A2PCT\", \"LABBETA\", \"BETAPCT\", \"GAMGLOB\", \"GGPCT\", \"PATH\"    UPEP: No results found for: \"TPU\"   HEPBSAG:No results found for: \"HEPBSAG\"  HEPCAB:No results found for: \"HEPCAB\"  C3: No results found for: \"C3\"  C4: No results found for: \"C4\"  MPO ANCA: No results found for: \"MPO\" .  PR3 ANCA:  No results found for: \"PR3\"                   URINALYSIS/URINE CHEMISTRIES      URINE CREATININE:    No results found for: \"LABCREA\"    URINE EOSINOPHILS : No results found for: \"UREO\"  URINE PROTEIN:  No results found for: \"TPU\"        RADIOLOGY    No results found for this or any previous visit.       Wt Readings from Last 3 Encounters:   05/20/25 77.7 kg (171 lb 4.8 oz)   04/29/25 71.7 kg (158 lb)   02/11/25 77.6 kg (171 lb)     Chronic kidney disease education given.    Explained risk factors for progression of CKD.  No

## 2025-05-21 NOTE — PROGRESS NOTES
Pt arrived on unit from ED. VSS upon arrival,  at bedside. Pt oriented to the room and all admissions questions answered by . Call light in reach. Pt states no other needs at this time.

## 2025-05-21 NOTE — PROGRESS NOTES
Lab called to report a critical potassium level of 6.3   Attending Dr. Villa made aware via secure message, awaiting response.

## 2025-05-22 NOTE — PLAN OF CARE
Problem: Chronic Conditions and Co-morbidities  Goal: Patient's chronic conditions and co-morbidity symptoms are monitored and maintained or improved  Recent Flowsheet Documentation  Taken 5/21/2025 2016 by Lia Jones RN  Care Plan - Patient's Chronic Conditions and Co-Morbidity Symptoms are Monitored and Maintained or Improved: Monitor and assess patient's chronic conditions and comorbid symptoms for stability, deterioration, or improvement     Problem: Discharge Planning  Goal: Discharge to home or other facility with appropriate resources  5/21/2025 2036 by Lia Jones RN  Outcome: Progressing  Flowsheets (Taken 5/21/2025 2016)  Discharge to home or other facility with appropriate resources: Identify barriers to discharge with patient and caregiver  Pt involved in discharge planning. Barriers to discharge discussed with patient. Discharge learning needs identified. Discuss with patient any additional needed resources and transportation plans. Case management following plan of care.      Problem: Pain  Goal: Verbalizes/displays adequate comfort level or baseline comfort level  5/21/2025 2036 by Lia Jones RN  Outcome: Progressing  5/21/2025 1944 by Brissa Rodney RN  Outcome: Progressing  Note: PRN pain medications administered per the MAR for head and back pain. Patient and family educated on available pain medications, side effects, and verbalized understanding. Frequent turning and repositioning for comfort and pressure relief. Notified family of pain medication administration schedule. Plan of care continues.      Problem: Pain  Goal: Verbalizes/displays adequate comfort level or baseline comfort level  5/21/2025 2036 by Lia Jones RN  Outcome: Progressing    Problem: Safety - Adult  Goal: Free from fall injury  5/21/2025 2036 by Lia Jones RN  Outcome: Progressing  All fall precautions in place. Bed locked and in lowest position with alarm on. Overbed table and personal belonings  within reach. Call light within reach and patient instructed to use call light for assistance. Non-skid socks on.

## 2025-05-22 NOTE — PROGRESS NOTES
RN came into pt room to do hourly checks, found pt unresponsive. Pt had no blood pressure, no oxygen saturation, and no pulse. Charge and MD notified.  at bedside, provided comfort and emotional support.  notified family members via phone of passing. IV removed, enrique removed, post mortem care completed.

## 2025-05-22 NOTE — PROGRESS NOTES
[facility]       Nephrology Progress Note    Allegra SANDOVAL Herrera Mg, DO    Assessment/Plan     ACUTE MI, Troponin > 10,000    PROSPER on CKD 4     CHF      Anemia    NSAID nephropathy     Severe OA      Plan   Family and patient has leaned towards comfort measures.   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 No history of dysuria or frequency.  Pt denies any hx of recent UTI , incontinence or nocturia or recurrent nephrolithiasis. No unusual skin rashes . No tea coloured urine .No recent procedures involving IV contrast.     Patient Active Problem List    Diagnosis Date Noted    Uncontrolled hypertension 10/06/2017     Priority: High    Bilateral lower extremity edema      Priority: High    PROSPER (acute kidney injury) 05/20/2025    Chronic HFrEF (heart failure with reduced ejection fraction) ([facility]) 04/29/2025    GI bleed 09/14/2024    BRBPR (bright red blood per rectum) 09/13/2024    Nonrheumatic aortic valve stenosis 09/02/2024    Leg weakness, bilateral 09/01/2024    Hypervolemia 08/31/2024    Murmur, cardiac 08/29/2024    Pacemaker 07/23/2019     Overview Note:     [employer] dual chamber MRI pacer implanted  otherwise negative except as mentioned in the [facility].     OBJECTIVE      Vitals:    05/21/25 2016   BP: (!) 102/47   Pulse: 74   Resp: 14   Temp: 97.9 °F (36.6 °C)   SpO2: 100%             Wt Readings from Last 2 Encounters:   05/21/25 76.4 kg (168 lb 6.9 oz)   04/29/25 71.7 kg (158 lb)     Body mass index is 28.03 kg/m².     PHYSICAL EXAM      GENERAL APPEARANCE:Awake, alert, Pale   SKIN: warm and dry, no rash or erythema  EYES: conjunctivae normal and sclera anicteric  ENT: no thrush no pharyngeal congestion   NECK:  No jvd  No carotid bruit   PULMONARY: clear to auscultation and no wheezing noted   CADRDIOVASCULAR: :Normal S1 & S2, no S3 or  S4, No Pericardial Rub. + murmur   ABDOMEN: soft nontender, bowel sounds, present, no organomegaly,  no ascites   EXTREMITIES: no cyanosis, clubbing or edema +  NEURO: alert and oriented, no deficits    LABS    CBC:   Lab Results   Component Value Date/Time    WBC 13.9 05/21/2025 08:03 AM    HGB 12.2 05/21/2025 08:03 AM    HCT 37.6 05/21/2025 08:03 AM    MCV 90.7 05/21/2025 08:03 AM    RDW 14.8 05/21/2025 08:03 AM     05/21/2025 08:03 AM    MPV 9.9 05/21/2025 08:03 AM      BMP:   Lab Results   Component Value Date/Time     05/21/2025 08:03 AM    K 6.3 05/21/2025 08:03 AM    CL 92 05/21/2025 08:03 AM    CO2 16 05/21/2025 08:03 AM    BUN 69 05/21/2025 08:03 AM    CREATININE 4.1 05/21/2025 08:03 AM    GLUCOSE 100 05/21/2025 08:03 AM    CALCIUM 10.4 05/21/2025 08:03 AM      PHOSPHORUS:    Lab Results   Component Value Date/Time    PHOS 5.0 04/29/2025 12:53 PM     MAGNESIUM:   Lab Results   Component Value Date/Time    MG 3.51 05/20/2025 07:11 PM     ALBUMIN:   No results found for: \"LABALBU\"    PTH: No components found for: \"PTHINTACT\"  VIT D3,25 HYDROXY: No results found for: \"VITD3\"     IRON:    Lab Results   Component Value Date/Time    IRON 53 04/29/2025 12:53 PM     IRON SATURATION:  No components found for: \"LABIRON\"  TIBC:    Lab Results   Component Value

## 2025-05-22 NOTE — DISCHARGE SUMMARY
[facility] Medicine  Death/Discharge Summary    Name:Allegra Reina       :  1940              MRN:  [id number]    Admit date:  2025    Discharge /death date:  2025    Admitting Physician:  Simon Russo MD          History of Present Illness: Allegra Reina is a 84 y.o. female with pmh of cardiac apathy with mild systolic dysfunction, moderate to severe aortic stenosis, CKD stage IV who presents with PROSPER (acute kidney injury), hypotension, with hyponatremia markedly elevated troponin in the 9000's..  There was concern for possible dissection and possible need for left heart cath but given her CKD, hypotension and the fact that the patient is DNR CC it was recommended for conservative approach .  Hospice was consulted family elected to be discharged home with hospice.  Given the fact that the goals of care was comfort no further workup was done    Discharge Physical Exam:    Please see death pronouncement note    Time of Death: 22.38 pm at 25    Cause of Death: PROSPER on CKD 4 with NSTEMI        Disposition:   [facility]          Signed:  Daisy Villa MD, MD  2025, 8:07 AM

## 2025-05-22 NOTE — DEATH NOTES
Death Pronouncement Note  Patient's Name: Allegra Reina   Patient's YOB: 1940  MRN Number: [id number]    Admitting Provider: Simon Russo MD  Attending Provider: Daisy Villa MD    Patient was examined and the following were absent: Pulses, Blood Pressure, and Respiratory effort    I declared the patient dead on 05/21/25 at 22:38    Preliminary Cause of Death:   NSTEMI.  Acute kidney injury on CKD stage IV.    Electronically signed by Simon Russo MD on 5/21/25 at 11:41 PM EDT   throat

## 2025-08-05 ENCOUNTER — TELEPHONE (OUTPATIENT)
Dept: CARDIOLOGY CLINIC | Age: 85
End: 2025-08-05

## (undated) DEVICE — FORCEPS BX L240CM JAW DIA2.4MM ORNG L CAP W/ NDL DISP RAD

## (undated) DEVICE — TRAP SPEC RETRV CLR PLAS POLYP IN LN SUCT QUIK CTCH

## (undated) DEVICE — SNARE COLD DIAMOND 10MM THIN